# Patient Record
Sex: MALE | Race: BLACK OR AFRICAN AMERICAN | Employment: OTHER | URBAN - METROPOLITAN AREA
[De-identification: names, ages, dates, MRNs, and addresses within clinical notes are randomized per-mention and may not be internally consistent; named-entity substitution may affect disease eponyms.]

---

## 2019-03-05 ENCOUNTER — OFFICE VISIT (OUTPATIENT)
Dept: FAMILY MEDICINE CLINIC | Age: 59
End: 2019-03-05

## 2019-03-05 VITALS
HEIGHT: 73 IN | RESPIRATION RATE: 14 BRPM | HEART RATE: 73 BPM | TEMPERATURE: 97.5 F | WEIGHT: 180 LBS | SYSTOLIC BLOOD PRESSURE: 172 MMHG | DIASTOLIC BLOOD PRESSURE: 94 MMHG | BODY MASS INDEX: 23.86 KG/M2 | OXYGEN SATURATION: 97 %

## 2019-03-05 DIAGNOSIS — I10 ESSENTIAL HYPERTENSION: Primary | ICD-10-CM

## 2019-03-05 DIAGNOSIS — M54.5 LOW BACK PAIN, UNSPECIFIED BACK PAIN LATERALITY, UNSPECIFIED CHRONICITY, WITH SCIATICA PRESENCE UNSPECIFIED: ICD-10-CM

## 2019-03-05 RX ORDER — PREDNISONE 20 MG/1
TABLET ORAL
COMMUNITY
End: 2019-09-11

## 2019-03-05 RX ORDER — AMLODIPINE BESYLATE 10 MG/1
10 TABLET ORAL DAILY
Qty: 30 TAB | Refills: 5 | Status: SHIPPED | OUTPATIENT
Start: 2019-03-05 | End: 2019-09-11

## 2019-03-05 RX ORDER — CYCLOBENZAPRINE HCL 5 MG
5 TABLET ORAL
COMMUNITY
End: 2019-09-11

## 2019-03-05 NOTE — PROGRESS NOTES
Chief Complaint   Patient presents with    Back Pain     1. When and where did you last receive medical care? Yes Where: [facility]     2. When and where did you last have preventive care such as mammogram, pap smears or colon screening? no    3. What is your current living situation (for example, live alone, live in home with immediate family members)? family    3. Do you have any problems with communication such trouble seeing, hearing, or understanding instructions? No    5. Do you have an advance directive? This is a document that you can give to family members with instructions for how you would want them to make health care decisions for you if you were unable to speak for yourself. (For example, unconscious, delerious)No    PMH/FH/Social Hx reviewed and updated as needed     Applicable screenings reviewed and updated as needed  Medication reconciliation performed. Patient does need medication refills. Health Maintenance reviewed.

## 2019-03-05 NOTE — PROGRESS NOTES
Discharge instructions reviewed with patient    Medication list and understanding of medications reviewed with patient. OTC and herbal medications reviewed and added to med list if applicable  Barriers to adherence assessed. Guidance given regarding new medications this visit, including reason for taking this medicine, and common side effects.         Referred to ortho/spine [address]     Patient advised of appt with Dr Tejal Robles  4/18/2019 1:30 arrive at 1:00 [facility] [id number]      AVS given to patient explained and patient expressed understands

## 2019-03-05 NOTE — PROGRESS NOTES
STEPAN Curiel is a 63 y.o. male being seen today for   Chief Complaint   Patient presents with    Back Pain   . IOV to care a Guerorenae Block for this pt with back pain and htn.   he states that he has back pain for years. Had MRI in 2015 with severe foraminal narrowing and he did benefit from steroid injections at that time. Over the years his pain is worse. His pain is mosly in gluteal area but legs are weak. He has been falling more. Pain is worse with walking or standing a long time but sitting for prolonged time is also hard. 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 Seen at urgent care where he was treated with prednisone taper and flexeril with some benefit. Known hx of htn but has not had a doctor to rx his meds. Previously on amlodipine 10. Previous smoker but he quit long ago    Past Medical History:   Diagnosis Date    Anemia     Bilateral sciatica     Chronic pain     Herniated intervertebral disc of lumbar spine     Hypertension     Muscle spasm of back          ROS  Patient states that he is feeling well. Denies complaints of chest pain, shortness of breath, swelling of legs, dizziness or weakness. he denies nausea, vomiting or diarrhea. Current Outpatient Medications   Medication Sig    cyclobenzaprine (FLEXERIL) 5 mg tablet Take 5 mg by mouth.  predniSONE (DELTASONE) 20 mg tablet Take  by mouth daily (with breakfast).  promethazine (PHENERGAN) 25 mg tablet Take 1 Tab by mouth every six (6) hours as needed. No current facility-administered medications for this visit. PE  Visit Vitals  BP (!) 172/94 (BP 1 Location: Right arm)   Pulse 73   Temp 97.5 °F (36.4 °C) (Temporal)   Resp 14   Ht 6' 1\" (1.854 m)   Wt 180 lb (81.6 kg)   SpO2 97%   BMI 23.75 kg/m²        Alert and oriented with normal mood and affect. he is well developed and well nourished . Lungs are clear without wheezing. Heart rate is regular without murmurs or gallops. There is no lower extremity edema. Gait is slow but stable. Bilateral DP and tibialis pulses are intact          Assessment and Plan:        ICD-10-CM ICD-9-CM    1. Essential hypertension  Restart amlodpine  followup 1-2 mos for recheck with labs  Baseline EKG photocopied for scan into chart. I10 401.9    2. Low back pain, unspecified back pain laterality, unspecified chronicity, with sciatica presence unspecified    Refer to ortho/spine. He may need updated imaging but he will likely qualify for medicaid so will defer till then.    M54.5 724.2      Apply for mediciad  Restart bp med  followup ortho      Jocelyne Piper MD

## 2019-04-18 ENCOUNTER — OFFICE VISIT (OUTPATIENT)
Dept: ORTHOPEDIC SURGERY | Age: 59
End: 2019-04-18

## 2019-04-18 VITALS
RESPIRATION RATE: 16 BRPM | OXYGEN SATURATION: 99 % | BODY MASS INDEX: 25.39 KG/M2 | SYSTOLIC BLOOD PRESSURE: 174 MMHG | DIASTOLIC BLOOD PRESSURE: 109 MMHG | TEMPERATURE: 98 F | WEIGHT: 191.6 LBS | HEART RATE: 70 BPM | HEIGHT: 73 IN

## 2019-04-18 DIAGNOSIS — M16.12 PRIMARY OSTEOARTHRITIS OF LEFT HIP: ICD-10-CM

## 2019-04-18 DIAGNOSIS — M47.816 LUMBAR SPONDYLOSIS: ICD-10-CM

## 2019-04-18 DIAGNOSIS — M48.02 CERVICAL SPINAL STENOSIS: Primary | ICD-10-CM

## 2019-04-18 DIAGNOSIS — M54.50 LUMBAR SPINE PAIN: ICD-10-CM

## 2019-04-18 RX ORDER — GABAPENTIN 300 MG/1
300 CAPSULE ORAL 3 TIMES DAILY
Qty: 60 CAP | Refills: 2 | Status: SHIPPED | OUTPATIENT
Start: 2019-04-18 | End: 2019-09-11

## 2019-04-18 NOTE — PROGRESS NOTES
[address]  [address], [address]  [place]  Phone: [phone]  Fax: [phone]        Vaishali Tao  : 1960  PCP: None      NEW PATIENT      ASSESSMENT AND PLAN     Diagnoses and all orders for this visit:    1. Cervical spinal stenosis  -     gabapentin (NEURONTIN) 300 mg capsule; Take 1 Cap by mouth three (3) times daily. Indications: Neuropathic Pain    2. Lumbar spondylosis    3. Lumbar spine pain  -     AMB POC XRAY, SPINE, LUMBOSACRAL; 4+    4. Primary osteoarthritis of left hip  -     REFERRAL TO ORTHOPEDICS       1. Advised to stay active as tolerated. 2. Referral to Dr. Ferrell Snellen for L hip OA, sx eval  3. Trial of Gabapentin  4. Discussed new MRI if ongoing/progressive cervical symptoms  5. Given information on back pain and preventing injury and hip pain    F/U PRN      CHIEF COMPLAINT  Vaishali Tao is seen today in consultation at the request of Dr. Kyrie Palacios for complaints of back pain. HISTORY OF PRESENT ILLNESS  Vaishali Tao is a 63 y.o. male. RHD. Today pt c/o back pain since . Pt denies any specific incident or injury that caused their pain. Pt reports imbalance. He admits to urinary urgency for the past few months. He denies bowel leakage or incontinence or urgency. He notes L hip pain with walking. He admits to lifting LLE into his car at times. He c/o morning stiffness. Pt denies neck pain and headaches. He reports R shoulder, arm, and side numbness and tingling over the past 2 weeks. He notes it is becoming more consistent. Location of pain: L groin   Does pain radiate into extremities: R shoulder, arm, and side are numb and tingling, more in last 1.5 week. Does patient have weakness: hand clumsiness   Pt denies saddle paresthesias. Medications pt is on: Aspirin PRN. Denies persistent fevers, chills, weight changes, neurogenic bowel or bladder symptoms. Pt denies recent ED visits or hospitalizations. Treatments patient has tried:  Physical therapy:No  Doing HEP: Unknown  Non-opioid medications: Yes  Spinal injections: No  Spinal surgery- No.   Last L MRI 2015: multilevel recess stenosis. Last C MRI 2013: mod stenosis     reviewed. PMHx of HTN, anemia, hip OA. Pt works in cabinetry, unemployed currently. No flowsheet data found. MRI lumbar spine 2015   IMPRESSION:  1. No significant central canal stenosis. 2. Severe left neural foraminal narrowing L4-L5. Correlate for left L4  radiculopathy. 3. Severe right neural foraminal narrowing L5-S1. Correlate for right L5  radiculopathy. 4. Moderate left neural foraminal narrowing L5-S1. Additional mild to moderate  neural foraminal narrowings as above.     See above for detailed level-by-level discussion. MRI cervical spine 2013   IMPRESSION:   1. C2-C3 SHALLOW POSTERIOR DISC PROTRUSION WITH MILD STENOSIS. 2. C3-C4 AND C4-C5 DISC BULGES WITH MILD STENOSIS. 3. C5-C6 SHALLOW POSTERIOR DISC OSTEOPHYTE COMPLEX WITH MODERATE CENTRAL   SPINAL CANAL STENOSIS. MILD BILATERAL NEURAL FORAMINAL STENOSIS  LEFT GREATER THAN RIGHT. 4. C6-C7 SHALLOW POSTERIOR DISC PROTRUSION WITH MODERATE CENTRAL SPINAL  CANAL STENOSIS. PAST MEDICAL HISTORY   Past Medical History:   Diagnosis Date    Anemia     Bilateral sciatica     Chronic pain     Herniated intervertebral disc of lumbar spine     Hypertension     Muscle spasm of back        History reviewed. No pertinent surgical history. MEDICATIONS      Current Outpatient Medications   Medication Sig Dispense Refill    gabapentin (NEURONTIN) 300 mg capsule Take 1 Cap by mouth three (3) times daily. Indications: Neuropathic Pain 60 Cap 2    cyclobenzaprine (FLEXERIL) 5 mg tablet Take 5 mg by mouth.  predniSONE (DELTASONE) 20 mg tablet Take  by mouth daily (with breakfast).  amLODIPine (NORVASC) 10 mg tablet Take 1 Tab by mouth daily.  30 Tab 5       ALLERGIES  No Known Allergies       SOCIAL HISTORY    Social History     Socioeconomic History    Marital status: SINGLE     Spouse name: Not on file    Number of children: Not on file    Years of education: Not on file    Highest education level: Not on file   Occupational History    Not on file   Social Needs    Financial resource strain: Not on file    Food insecurity:     Worry: Not on file     Inability: Not on file    Transportation needs:     Medical: Not on file     Non-medical: Not on file   Tobacco Use    Smoking status: Former Smoker     Types: Cigarettes    Smokeless tobacco: Never Used   Substance and Sexual Activity    Alcohol use: No    Drug use: No    Sexual activity: Not on file   Lifestyle    Physical activity:     Days per week: Not on file     Minutes per session: Not on file    Stress: Not on file   Relationships    Social connections:     Talks on phone: Not on file     Gets together: Not on file     Attends Catholic service: Not on file     Active member of club or organization: Not on file     Attends meetings of clubs or organizations: Not on file     Relationship status: Not on file    Intimate partner violence:     Fear of current or ex partner: Not on file     Emotionally abused: Not on file     Physically abused: Not on file     Forced sexual activity: Not on file   Other Topics Concern    Not on file   Social History Narrative    Not on file     Socioeconomic History    Marital status: SINGLE     Spouse name: Not on file    Number of children: Not on file    Years of education: Not on file    Highest education level: Not on file   Occupational History    Not on file   Social Needs    Financial resource strain: Not on file    Food insecurity:     Worry: Not on file     Inability: Not on file    Transportation needs:     Medical: Not on file     Non-medical: Not on file   Tobacco Use    Smoking status: Former Smoker     Types: Cigarettes    Smokeless tobacco: Never Used   Substance and Sexual Activity  Alcohol use: No    Drug use: No    Sexual activity: Not on file   Lifestyle    Physical activity:     Days per week: Not on file     Minutes per session: Not on file    Stress: Not on file   Relationships    Social connections:     Talks on phone: Not on file     Gets together: Not on file     Attends Scientologist service: Not on file     Active member of club or organization: Not on file     Attends meetings of clubs or organizations: Not on file     Relationship status: Not on file    Intimate partner violence:     Fear of current or ex partner: Not on file     Emotionally abused: Not on file     Physically abused: Not on file     Forced sexual activity: Not on file   Other Topics Concern    Not on file   Social History Narrative    Not on file      Problem Relation Age of Onset    Hypertension Mother     Cancer Mother     Cancer Father     Diabetes Father          REVIEW OF SYSTEMS  Review of Systems   Constitutional: Negative for chills, fever and weight loss. Respiratory: Negative for shortness of breath. Cardiovascular: Negative for chest pain. Gastrointestinal: Negative for constipation. Negative for fecal incontinence   Genitourinary: Positive for urgency. Negative for dysuria. Negative for urinary incontinence   Musculoskeletal: Positive for joint pain. Per HPI   Skin: Negative for rash. Neurological: Positive for tingling and focal weakness. Negative for dizziness, tremors and headaches. Endo/Heme/Allergies: Does not bruise/bleed easily. Psychiatric/Behavioral: The patient does not have insomnia. PHYSICAL EXAMINATION  Visit Vitals  BP (!) 174/109   Pulse 70   Temp 98 °F (36.7 °C)   Resp 16   Ht 6' 1\" (1.854 m)   Wt 191 lb 9.6 oz (86.9 kg)   SpO2 99%   BMI 25.28 kg/m²          Accompanied by self. Constitutional:  Well developed, well nourished, in no acute distress. Psychiatric: Affect and mood are appropriate.    Integumentary: No rashes or abrasions noted on exposed areas. Cardiovascular/Peripheral Vascular: Intact l pulses. No peripheral edema is noted BLE. Lymphatic:  No evidence of lymphedema. No cervical lymphadenopathy. SPINE/MUSCULOSKELETAL EXAM      Cervical spine:  Neck is midline. Normal muscle tone. No focal atrophy is noted. Tenderness to palpation none cervical spine. Lumbar spine:  No rash, ecchymosis, or gross obliquity. No fasciculations. No focal atrophy is noted. Limitation and pain with L hip ROM. Tenderness to palpation none lumbar spine. No tenderness to palpation at the sciatic notch. SI joints non-tender. Trochanters non tender. MOTOR:     Biceps Triceps Deltoids Wrist Ext Wrist Flex Hand Intrin   Right 5/5 5/5 5/5 5/5 5/5 +4/5   Left 5/5 5/5 5/5 5/5 5/5 5/5       Hip Flex Quads Hamstrings Ankle DF EHL Ankle PF   Right 5/5 5/5 5/5 5/5 5/5 5/5   Left 5/5 5/5 5/5 5/5 5/5 5/5        Straight Leg raise negative. Mild difficulty with tandem gait. No clonus. Ambulation without assistive device. FWB. RADIOGRAPHS  Lumbar spine xray films reviewed:  1) disc space narrowing L4-5-S1  2) no instability  3) advanced OA L hip    Written by Lennox Isaacs, as dictated by Vimal Nesbitt MD.    I, Dr. Vimal Nesbitt MD, confirm that all documentation is accurate. Mr. Sheila Hurtado may have a reminder for a \"due or due soon\" health maintenance. I have asked that he contact his primary care provider for follow-up on this health maintenance.

## 2019-04-18 NOTE — PATIENT INSTRUCTIONS
Back Pain: Care Instructions  Your Care Instructions    Back pain has many possible causes. It is often related to problems with muscles and ligaments of the back. It may also be related to problems with the nerves, discs, or bones of the back. Moving, lifting, standing, sitting, or sleeping in an awkward way can strain the back. Sometimes you don't notice the injury until later. Arthritis is another common cause of back pain. Although it may hurt a lot, back pain usually improves on its own within several weeks. Most people recover in 12 weeks or less. Using good home treatment and being careful not to stress your back can help you feel better sooner. Follow-up care is a key part of your treatment and safety. Be sure to make and go to all appointments, and call your doctor if you are having problems. It's also a good idea to know your test results and keep a list of the medicines you take. How can you care for yourself at home? · Sit or lie in positions that are most comfortable and reduce your pain. Try one of these positions when you lie down:  ? Lie on your back with your knees bent and supported by large pillows. ? Lie on the floor with your legs on the seat of a sofa or chair. ? Lie on your side with your knees and hips bent and a pillow between your legs. ? Lie on your stomach if it does not make pain worse. · Do not sit up in bed, and avoid soft couches and twisted positions. Bed rest can help relieve pain at first, but it delays healing. Avoid bed rest after the first day of back pain. · Change positions every 30 minutes. If you must sit for long periods of time, take breaks from sitting. Get up and walk around, or lie in a comfortable position. · Try using a heating pad on a low or medium setting for 15 to 20 minutes every 2 or 3 hours. Try a warm shower in place of one session with the heating pad. · You can also try an ice pack for 10 to 15 minutes every 2 to 3 hours.  Put a thin cloth between the ice pack and your skin. · Take pain medicines exactly as directed. ? If the doctor gave you a prescription medicine for pain, take it as prescribed. ? If you are not taking a prescription pain medicine, ask your doctor if you can take an over-the-counter medicine. · Take short walks several times a day. You can start with 5 to 10 minutes, 3 or 4 times a day, and work up to longer walks. Walk on level surfaces and avoid hills and stairs until your back is better. · Return to work and other activities as soon as you can. Continued rest without activity is usually not good for your back. · To prevent future back pain, do exercises to stretch and strengthen your back and stomach. Learn how to use good posture, safe lifting techniques, and proper body mechanics. When should you call for help? Call your doctor now or seek immediate medical care if:    · You have new or worsening numbness in your legs.     · You have new or worsening weakness in your legs. (This could make it hard to stand up.)     · You lose control of your bladder or bowels.    Watch closely for changes in your health, and be sure to contact your doctor if:    · You have a fever, lose weight, or don't feel well.     · You do not get better as expected. Where can you learn more? Go to http://valeriy-tyesha.info/. Enter P263 in the search box to learn more about \"Back Pain: Care Instructions. \"  Current as of: September 20, 2018  Content Version: 11.9  © 8196-1811 [employer]. Care instructions adapted under license by [employer] (which disclaims liability or warranty for this information). If you have questions about a medical condition or this instruction, always ask your healthcare professional. [place] [address] any warranty or liability for your use of this information.          Hip Pain: Care Instructions  Your Care Instructions    Hip pain may be caused by many things, including overuse, a fall, or a twisting movement. Another cause of hip pain is arthritis. Your pain may increase when you stand up, walk, or squat. The pain may come and go or may be constant. Home treatment can help relieve hip pain, swelling, and stiffness. If your pain is ongoing, you may need more tests and treatment. Follow-up care is a key part of your treatment and safety. Be sure to make and go to all appointments, and call your doctor if you are having problems. It's also a good idea to know your test results and keep a list of the medicines you take. How can you care for yourself at home? · Take pain medicines exactly as directed. ? If the doctor gave you a prescription medicine for pain, take it as prescribed. ? If you are not taking a prescription pain medicine, ask your doctor if you can take an over-the-counter medicine. · Rest and protect your hip. Take a break from any activity, including standing or walking, that may cause pain. · Put ice or a cold pack against your hip for 10 to 20 minutes at a time. Try to do this every 1 to 2 hours for the next 3 days (when you are awake) or until the swelling goes down. Put a thin cloth between the ice and your skin. · Sleep on your healthy side with a pillow between your knees, or sleep on your back with pillows under your knees. · If there is no swelling, you can put moist heat, a heating pad, or a warm cloth on your hip. Do gentle stretching exercises to help keep your hip flexible. · Learn how to prevent falls. Have your vision and hearing checked regularly. Wear slippers or shoes with a nonskid sole. · Stay at a healthy weight. · Wear comfortable shoes. When should you call for help? Call 911 anytime you think you may need emergency care.  For example, call if:    · You have sudden chest pain and shortness of breath, or you cough up blood.     · You are not able to stand or walk or bear weight.     · Your buttocks, legs, or feet feel numb or tingly.     · Your leg or foot is cool or pale or changes color.     · You have severe pain.    Call your doctor now or seek immediate medical care if:    · You have signs of infection, such as:  ? Increased pain, swelling, warmth, or redness in the hip area. ? Red streaks leading from the hip area. ? Pus draining from the hip area. ? A fever.     · You have signs of a blood clot, such as:  ? Pain in your calf, back of the knee, thigh, or groin. ? Redness and swelling in your leg or groin.     · You are not able to bend, straighten, or move your leg normally.     · You have trouble urinating or having bowel movements.    Watch closely for changes in your health, and be sure to contact your doctor if:    · You do not get better as expected. Where can you learn more? Go to http://valeriy-tyesha.info/. Enter W475 in the search box to learn more about \"Hip Pain: Care Instructions. \"  Current as of: September 23, 2018  Content Version: 11.9  © 8076-7732 [employer]. Care instructions adapted under license by [employer] (which disclaims liability or warranty for this information). If you have questions about a medical condition or this instruction, always ask your healthcare professional. [address] any warranty or liability for your use of this information.

## 2019-05-22 ENCOUNTER — OFFICE VISIT (OUTPATIENT)
Dept: ORTHOPEDIC SURGERY | Facility: CLINIC | Age: 59
End: 2019-05-22

## 2019-05-22 VITALS
RESPIRATION RATE: 18 BRPM | TEMPERATURE: 96.7 F | SYSTOLIC BLOOD PRESSURE: 130 MMHG | HEART RATE: 58 BPM | OXYGEN SATURATION: 99 % | HEIGHT: 73 IN | DIASTOLIC BLOOD PRESSURE: 84 MMHG | BODY MASS INDEX: 24.15 KG/M2 | WEIGHT: 182.2 LBS

## 2019-05-22 DIAGNOSIS — G89.29 CHRONIC LEFT-SIDED LOW BACK PAIN, WITH SCIATICA PRESENCE UNSPECIFIED: ICD-10-CM

## 2019-05-22 DIAGNOSIS — M54.5 CHRONIC LEFT-SIDED LOW BACK PAIN, WITH SCIATICA PRESENCE UNSPECIFIED: ICD-10-CM

## 2019-05-22 DIAGNOSIS — M25.552 LEFT HIP PAIN: ICD-10-CM

## 2019-05-22 DIAGNOSIS — M17.0 PRIMARY OSTEOARTHRITIS OF BOTH KNEES: ICD-10-CM

## 2019-05-22 DIAGNOSIS — D64.9 ANEMIA, UNSPECIFIED TYPE: ICD-10-CM

## 2019-05-22 DIAGNOSIS — M16.12 PRIMARY OSTEOARTHRITIS OF LEFT HIP: Primary | ICD-10-CM

## 2019-05-22 DIAGNOSIS — Z01.818 PRE-OPERATIVE CLEARANCE: ICD-10-CM

## 2019-05-22 DIAGNOSIS — M19.90 INFLAMMATORY ARTHROPATHY: ICD-10-CM

## 2019-05-22 RX ORDER — MELOXICAM 15 MG/1
15 TABLET ORAL DAILY
Qty: 30 TAB | Refills: 2 | Status: SHIPPED | OUTPATIENT
Start: 2019-05-22 | End: 2019-09-11

## 2019-05-22 RX ORDER — BETAMETHASONE SODIUM PHOSPHATE AND BETAMETHASONE ACETATE 3; 3 MG/ML; MG/ML
6 INJECTION, SUSPENSION INTRA-ARTICULAR; INTRALESIONAL; INTRAMUSCULAR; SOFT TISSUE ONCE
Qty: 1 ML | Refills: 0
Start: 2019-05-22 | End: 2019-05-22

## 2019-05-22 NOTE — PROGRESS NOTES
Patient: Chance Mcrae                MRN: [id number]       SSN: [id number]  YOB: 1960        AGE: 63 y.o. SEX: male  Body mass index is 24.04 kg/m². PCP: None  05/22/19    HISTORY:  Mr. Sandra Gotti is a pleasant gentleman who essentially has end-stage arthritis and possibly some avascular necrosis (AVN) involving his left hip. It has been ongoing for about 4 years. Also some back problems and radiculopathy. Difficulty putting shoes and socks on, getting in and out of the car. He works as a daniel. It is very difficult for him. Arthritis runs in the family. He may have sickle cell trait, and we will obtain blood work for him and also consult with Hematology. Also a family history of arthritis. We well get some rheumatological labs as well. The pain is moderate, moderately-severe. Also complaining of knee pain. Gets some radiculopathy down the legs as well. Hurts him to sit for a long time with regards to his back. PHYSICAL EXAMINATION:  He walks with a very distinctive antalgic gait onto the left hip. Also both knees are bad. He is in valgus, a little worse on the right than on the left, and a couple degree fixed flexion deformity. The right hip rotates fairly well. The left hip is quite stiff which reproduces groin discomfort. Slight decrease in sensation to L5 bilaterally. No foot drop. Tibia and EHL 5/5. Both feet warm and well perfused. The hands are not particularly affected. A little bit of Briana and [place] in his nodes but mild. No significant involvement at the MCPs. RADIOGRAPHS:  Review of his x-rays show end-stage arthritis, left hip. Possible mild AVN as well. Cystic activity on both sides of the joint. PLAN:  I think he should have a direct anterior hip replacement. I think he would do well with it.   We talked about risks and benefits, including but not limited to infection, DVT, pulmonary embolism, anesthetic complications, blood loss requiring transfusion, pain, stiffness, dislocation, limp and other complications including numbness and tingling. PROCEDURE:  Each of the 2 knees were injected today with 1 mL of Celestone preparation, 6 mg. When he returns, we will be reviewing the consultation note from Hematology, reviewing his blood work, and obtaining x-rays of the knees. It has been a pleasure to share in his care. CC:  [id number]        REVIEW OF SYSTEMS:      CON: negative for weight loss, fever  EYE: negative for double vision  ENT: negative for hoarseness  RS:   negative for Tb  GI:    negative for blood in stool  :  negative for blood in urine  Other systems reviewed and noted below. Past Medical History:   Diagnosis Date    Anemia     Bilateral sciatica     Chronic pain     Herniated intervertebral disc of lumbar spine     Hypertension     Muscle spasm of back        Family History   Problem Relation Age of Onset    Hypertension Mother     Cancer Mother     Cancer Father     Diabetes Father        Current Outpatient Medications   Medication Sig Dispense Refill    gabapentin (NEURONTIN) 300 mg capsule Take 1 Cap by mouth three (3) times daily. Indications: Neuropathic Pain 60 Cap 2    amLODIPine (NORVASC) 10 mg tablet Take 1 Tab by mouth daily. 30 Tab 5    cyclobenzaprine (FLEXERIL) 5 mg tablet Take 5 mg by mouth.  predniSONE (DELTASONE) 20 mg tablet Take  by mouth daily (with breakfast). No Known Allergies    History reviewed. No pertinent surgical history.     Social History     Socioeconomic History    Marital status: SINGLE     Spouse name: Not on file    Number of children: Not on file    Years of education: Not on file    Highest education level: Not on file   Occupational History    Not on file   Social Needs    Financial resource strain: Not on file    Food insecurity:     Worry: Not on file     Inability: Not on file   [employer] needs: Medical: Not on file     Non-medical: Not on file   Tobacco Use    Smoking status: Former Smoker     Types: Cigarettes    Smokeless tobacco: Never Used   Substance and Sexual Activity    Alcohol use: No    Drug use: No    Sexual activity: Not on file   Lifestyle    Physical activity:     Days per week: Not on file     Minutes per session: Not on file    Stress: Not on file   Relationships    Social connections:     Talks on phone: Not on file     Gets together: Not on file     Attends Orthodoxy service: Not on file     Active member of club or organization: Not on file     Attends meetings of clubs or organizations: Not on file     Relationship status: Not on file    Intimate partner violence:     Fear of current or ex partner: Not on file     Emotionally abused: Not on file     Physically abused: Not on file     Forced sexual activity: Not on file   Other Topics Concern    Not on file   Social History Narrative    Not on file       Visit Vitals  /84   Pulse (!) 58   Temp 96.7 °F (35.9 °C) (Oral)   Resp 18   Ht 6' 1\" (1.854 m)   Wt 182 lb 3.2 oz (82.6 kg)   SpO2 99%   BMI 24.04 kg/m²         PHYSICAL EXAMINATION:  GENERAL: Alert and oriented x3, in no acute distress, well-developed, well-nourished, afebrile. HEART: No JVD. EYES: No scleral icterus   NECK: No significant lymphadenopathy   LUNGS: No respiratory compromise or indrawing  ABDOMEN: Soft, non-tender, non-distended. Electronically signed by:  Fadia Klein MD

## 2019-05-23 ENCOUNTER — HOSPITAL ENCOUNTER (EMERGENCY)
Age: 59
Discharge: HOME | End: 2019-05-24
Attending: EMERGENCY MEDICINE | Admitting: EMERGENCY MEDICINE
Payer: MEDICAID

## 2019-05-23 VITALS
OXYGEN SATURATION: 100 % | SYSTOLIC BLOOD PRESSURE: 173 MMHG | DIASTOLIC BLOOD PRESSURE: 103 MMHG | TEMPERATURE: 97 F | HEART RATE: 56 BPM | RESPIRATION RATE: 16 BRPM

## 2019-05-23 DIAGNOSIS — R11.2 NON-INTRACTABLE VOMITING WITH NAUSEA, UNSPECIFIED VOMITING TYPE: Primary | ICD-10-CM

## 2019-05-23 LAB
ALBUMIN SERPL-MCNC: 4.2 G/DL (ref 3.4–5)
ALBUMIN/GLOB SERPL: 1.1 {RATIO} (ref 0.8–1.7)
ALP SERPL-CCNC: 60 U/L (ref 45–117)
ALT SERPL-CCNC: 17 U/L (ref 16–61)
ANION GAP SERPL CALC-SCNC: 9 MMOL/L (ref 3–18)
APPEARANCE UR: ABNORMAL
AST SERPL-CCNC: 25 U/L (ref 15–37)
BASOPHILS # BLD: 0 K/UL (ref 0–0.1)
BASOPHILS NFR BLD: 0 % (ref 0–2)
BILIRUB SERPL-MCNC: 0.4 MG/DL (ref 0.2–1)
BILIRUB UR QL: NEGATIVE
BUN SERPL-MCNC: 14 MG/DL (ref 7–18)
BUN/CREAT SERPL: 17 (ref 12–20)
CALCIUM SERPL-MCNC: 9.5 MG/DL (ref 8.5–10.1)
CHLORIDE SERPL-SCNC: 105 MMOL/L (ref 100–108)
CK MB CFR SERPL CALC: 1 % (ref 0–4)
CK MB SERPL-MCNC: 1.8 NG/ML (ref 5–25)
CK SERPL-CCNC: 189 U/L (ref 39–308)
CO2 SERPL-SCNC: 24 MMOL/L (ref 21–32)
COLOR UR: YELLOW
CREAT SERPL-MCNC: 0.84 MG/DL (ref 0.6–1.3)
DIFFERENTIAL METHOD BLD: ABNORMAL
EOSINOPHIL # BLD: 0 K/UL (ref 0–0.4)
EOSINOPHIL NFR BLD: 0 % (ref 0–5)
ERYTHROCYTE [DISTWIDTH] IN BLOOD BY AUTOMATED COUNT: 13.9 % (ref 11.6–14.5)
GLOBULIN SER CALC-MCNC: 3.7 G/DL (ref 2–4)
GLUCOSE SERPL-MCNC: 137 MG/DL (ref 74–99)
GLUCOSE UR STRIP.AUTO-MCNC: NEGATIVE MG/DL
HCT VFR BLD AUTO: 35.8 % (ref 36–48)
HGB BLD-MCNC: 12 G/DL (ref 13–16)
HGB UR QL STRIP: NEGATIVE
KETONES UR QL STRIP.AUTO: ABNORMAL MG/DL
LEUKOCYTE ESTERASE UR QL STRIP.AUTO: NEGATIVE
LIPASE SERPL-CCNC: 85 U/L (ref 73–393)
LYMPHOCYTES # BLD: 1.9 K/UL (ref 0.9–3.6)
LYMPHOCYTES NFR BLD: 14 % (ref 21–52)
MCH RBC QN AUTO: 22.8 PG (ref 24–34)
MCHC RBC AUTO-ENTMCNC: 33.5 G/DL (ref 31–37)
MCV RBC AUTO: 67.9 FL (ref 74–97)
MONOCYTES # BLD: 0.8 K/UL (ref 0.05–1.2)
MONOCYTES NFR BLD: 6 % (ref 3–10)
NEUTS SEG # BLD: 10.7 K/UL (ref 1.8–8)
NEUTS SEG NFR BLD: 80 % (ref 40–73)
NITRITE UR QL STRIP.AUTO: NEGATIVE
PH UR STRIP: 8.5 [PH] (ref 5–8)
PLATELET # BLD AUTO: 294 K/UL (ref 135–420)
PMV BLD AUTO: 9.3 FL (ref 9.2–11.8)
POTASSIUM SERPL-SCNC: 4.3 MMOL/L (ref 3.5–5.5)
PROT SERPL-MCNC: 7.9 G/DL (ref 6.4–8.2)
PROT UR STRIP-MCNC: NEGATIVE MG/DL
RBC # BLD AUTO: 5.27 M/UL (ref 4.7–5.5)
SODIUM SERPL-SCNC: 138 MMOL/L (ref 136–145)
SP GR UR REFRACTOMETRY: 1.01 (ref 1–1.03)
TROPONIN I SERPL-MCNC: <0.02 NG/ML (ref 0–0.04)
UROBILINOGEN UR QL STRIP.AUTO: 0.2 EU/DL (ref 0.2–1)
WBC # BLD AUTO: 13.4 K/UL (ref 4.6–13.2)

## 2019-05-23 PROCEDURE — 80053 COMPREHEN METABOLIC PANEL: CPT

## 2019-05-23 PROCEDURE — 96372 THER/PROPH/DIAG INJ SC/IM: CPT

## 2019-05-23 PROCEDURE — 74011250636 HC RX REV CODE- 250/636: Performed by: EMERGENCY MEDICINE

## 2019-05-23 PROCEDURE — 83690 ASSAY OF LIPASE: CPT

## 2019-05-23 PROCEDURE — 82550 ASSAY OF CK (CPK): CPT

## 2019-05-23 PROCEDURE — 93005 ELECTROCARDIOGRAM TRACING: CPT

## 2019-05-23 PROCEDURE — 96374 THER/PROPH/DIAG INJ IV PUSH: CPT

## 2019-05-23 PROCEDURE — 85025 COMPLETE CBC W/AUTO DIFF WBC: CPT

## 2019-05-23 PROCEDURE — 81003 URINALYSIS AUTO W/O SCOPE: CPT

## 2019-05-23 PROCEDURE — 96375 TX/PRO/DX INJ NEW DRUG ADDON: CPT

## 2019-05-23 PROCEDURE — 99285 EMERGENCY DEPT VISIT HI MDM: CPT

## 2019-05-23 RX ORDER — ONDANSETRON 2 MG/ML
4 INJECTION INTRAMUSCULAR; INTRAVENOUS
Status: COMPLETED | OUTPATIENT
Start: 2019-05-23 | End: 2019-05-23

## 2019-05-23 RX ORDER — METOCLOPRAMIDE HYDROCHLORIDE 5 MG/ML
10 INJECTION INTRAMUSCULAR; INTRAVENOUS
Status: COMPLETED | OUTPATIENT
Start: 2019-05-23 | End: 2019-05-24

## 2019-05-23 RX ORDER — DICYCLOMINE HYDROCHLORIDE 10 MG/ML
20 INJECTION INTRAMUSCULAR
Status: COMPLETED | OUTPATIENT
Start: 2019-05-23 | End: 2019-05-23

## 2019-05-23 RX ADMIN — ONDANSETRON 4 MG: 2 INJECTION INTRAMUSCULAR; INTRAVENOUS at 21:11

## 2019-05-23 RX ADMIN — DICYCLOMINE HYDROCHLORIDE 20 MG: 20 INJECTION, SOLUTION INTRAMUSCULAR at 21:11

## 2019-05-24 PROCEDURE — 74011250636 HC RX REV CODE- 250/636: Performed by: EMERGENCY MEDICINE

## 2019-05-24 RX ORDER — ONDANSETRON 4 MG/1
4 TABLET, ORALLY DISINTEGRATING ORAL
Qty: 12 TAB | Refills: 0 | Status: SHIPPED | OUTPATIENT
Start: 2019-05-24 | End: 2019-09-11

## 2019-05-24 RX ADMIN — METOCLOPRAMIDE 10 MG: 5 INJECTION, SOLUTION INTRAMUSCULAR; INTRAVENOUS at 00:18

## 2019-05-24 NOTE — ED PROVIDER NOTES
EMERGENCY DEPARTMENT HISTORY AND PHYSICAL EXAM    8:49 PM      Date: 5/23/2019  Patient Name: Vaishali Tao    History of Presenting Illness     Chief Complaint   Patient presents with    Abdominal Pain    Vomiting         History Provided By: Patient    Additional History (Context): Vaishali Tao is a 63 y.o. male with history of hypertension who presents with about 5 hours of nausea and vomiting. He states he occasionally has diffuse cramps before he vomits, but denies other abdominal pain. He denies any diarrhea, fever or known sick contacts. PCP: None    Current Outpatient Medications   Medication Sig Dispense Refill    ondansetron (ZOFRAN ODT) 4 mg disintegrating tablet Take 1 Tab by mouth every eight (8) hours as needed for Nausea. 12 Tab 0    meloxicam (MOBIC) 15 mg tablet Take 1 Tab by mouth daily. 30 Tab 2    gabapentin (NEURONTIN) 300 mg capsule Take 1 Cap by mouth three (3) times daily. Indications: Neuropathic Pain 60 Cap 2    cyclobenzaprine (FLEXERIL) 5 mg tablet Take 5 mg by mouth.  predniSONE (DELTASONE) 20 mg tablet Take  by mouth daily (with breakfast).  amLODIPine (NORVASC) 10 mg tablet Take 1 Tab by mouth daily. 30 Tab 5       Past History     Past Medical History:  Past Medical History:   Diagnosis Date    Anemia     Bilateral sciatica     Chronic pain     Herniated intervertebral disc of lumbar spine     Hypertension     Muscle spasm of back        Past Surgical History:  History reviewed. No pertinent surgical history.     Family History:  Family History   Problem Relation Age of Onset    Hypertension Mother     Cancer Mother     Cancer Father     Diabetes Father        Social History:  Social History     Tobacco Use    Smoking status: Former Smoker     Types: Cigarettes    Smokeless tobacco: Never Used   Substance Use Topics    Alcohol use: No    Drug use: No       Allergies:  No Known Allergies      Review of Systems       Review of Systems   Constitutional: Negative for activity change and appetite change. HENT: Negative for congestion. Eyes: Negative for visual disturbance. Respiratory: Negative for cough and shortness of breath. Cardiovascular: Negative for chest pain. Gastrointestinal: Positive for nausea and vomiting. Negative for abdominal pain and diarrhea. Genitourinary: Negative for dysuria. Musculoskeletal: Negative for arthralgias and myalgias. Skin: Negative for rash. Neurological: Negative for weakness and numbness. Physical Exam     Visit Vitals  BP (!) 173/103 (BP 1 Location: Right arm, BP Patient Position: At rest)   Pulse (!) 56   Temp 97 °F (36.1 °C)   Resp 16   SpO2 100%         Physical Exam   Constitutional: He is oriented to person, place, and time. He appears well-developed and well-nourished. HENT:   Head: Normocephalic and atraumatic. Mouth/Throat: Oropharynx is clear and moist.   Eyes: Conjunctivae are normal.   Neck: Normal range of motion. Neck supple. No JVD present. Cardiovascular: Normal rate, regular rhythm, normal heart sounds and intact distal pulses. No murmur heard. Pulmonary/Chest: Effort normal and breath sounds normal.   Abdominal: Soft. Bowel sounds are normal. He exhibits no distension. There is no tenderness. Musculoskeletal: Normal range of motion. He exhibits no deformity. Lymphadenopathy:     He has no cervical adenopathy. Neurological: He is alert and oriented to person, place, and time. Coordination normal.   Skin: Skin is warm and dry. No rash noted. Psychiatric: He has a normal mood and affect. Nursing note and vitals reviewed.         Diagnostic Study Results     Labs -  Recent Results (from the past 12 hour(s))   EKG, 12 LEAD, INITIAL    Collection Time: 05/23/19  8:15 PM   Result Value Ref Range    Ventricular Rate 62 BPM    Atrial Rate 62 BPM    P-R Interval 190 ms    QRS Duration 102 ms    Q-T Interval 404 ms    QTC Calculation (Bezet) 410 ms    Calculated P Axis 66 degrees    Calculated R Axis 62 degrees    Calculated T Axis 67 degrees    Diagnosis       Normal sinus rhythm  Normal ECG  No previous ECGs available     CBC WITH AUTOMATED DIFF    Collection Time: 05/23/19  8:26 PM   Result Value Ref Range    WBC 13.4 (H) 4.6 - 13.2 K/uL    RBC 5.27 4.70 - 5.50 M/uL    HGB 12.0 (L) 13.0 - 16.0 g/dL    HCT 35.8 (L) 36.0 - 48.0 %    MCV 67.9 (L) 74.0 - 97.0 FL    MCH 22.8 (L) 24.0 - 34.0 PG    MCHC 33.5 31.0 - 37.0 g/dL    RDW 13.9 11.6 - 14.5 %    PLATELET 792 921 - 929 K/uL    MPV 9.3 9.2 - 11.8 FL    NEUTROPHILS 80 (H) 40 - 73 %    LYMPHOCYTES 14 (L) 21 - 52 %    MONOCYTES 6 3 - 10 %    EOSINOPHILS 0 0 - 5 %    BASOPHILS 0 0 - 2 %    ABS. NEUTROPHILS 10.7 (H) 1.8 - 8.0 K/UL    ABS. LYMPHOCYTES 1.9 0.9 - 3.6 K/UL    ABS. MONOCYTES 0.8 0.05 - 1.2 K/UL    ABS. EOSINOPHILS 0.0 0.0 - 0.4 K/UL    ABS. BASOPHILS 0.0 0.0 - 0.1 K/UL    DF AUTOMATED     METABOLIC PANEL, COMPREHENSIVE    Collection Time: 05/23/19  8:26 PM   Result Value Ref Range    Sodium 138 136 - 145 mmol/L    Potassium 4.3 3.5 - 5.5 mmol/L    Chloride 105 100 - 108 mmol/L    CO2 24 21 - 32 mmol/L    Anion gap 9 3.0 - 18 mmol/L    Glucose 137 (H) 74 - 99 mg/dL    BUN 14 7.0 - 18 MG/DL    Creatinine 0.84 0.6 - 1.3 MG/DL    BUN/Creatinine ratio 17 12 - 20      GFR est AA >60 >60 ml/min/1.73m2    GFR est non-AA >60 >60 ml/min/1.73m2    Calcium 9.5 8.5 - 10.1 MG/DL    Bilirubin, total 0.4 0.2 - 1.0 MG/DL    ALT (SGPT) 17 16 - 61 U/L    AST (SGOT) 25 15 - 37 U/L    Alk.  phosphatase 60 45 - 117 U/L    Protein, total 7.9 6.4 - 8.2 g/dL    Albumin 4.2 3.4 - 5.0 g/dL    Globulin 3.7 2.0 - 4.0 g/dL    A-G Ratio 1.1 0.8 - 1.7     CARDIAC PANEL,(CK, CKMB & TROPONIN)    Collection Time: 05/23/19  8:26 PM   Result Value Ref Range     39 - 308 U/L    CK - MB 1.8 <3.6 ng/ml    CK-MB Index 1.0 0.0 - 4.0 %    Troponin-I, QT <0.02 0.0 - 0.045 NG/ML   LIPASE    Collection Time: 05/23/19  8:26 PM   Result Value Ref Range    Lipase 85 73 - 393 U/L   URINALYSIS W/ RFLX MICROSCOPIC    Collection Time: 05/23/19  8:30 PM   Result Value Ref Range    Color YELLOW      Appearance CLOUDY      Specific gravity 1.014 1.005 - 1.030      pH (UA) 8.5 (H) 5.0 - 8.0      Protein NEGATIVE  NEG mg/dL    Glucose NEGATIVE  NEG mg/dL    Ketone TRACE (A) NEG mg/dL    Bilirubin NEGATIVE  NEG      Blood NEGATIVE  NEG      Urobilinogen 0.2 0.2 - 1.0 EU/dL    Nitrites NEGATIVE  NEG      Leukocyte Esterase NEGATIVE  NEG         Radiologic Studies -   No orders to display         Medical Decision Making   I am the first provider for this patient. I reviewed the vital signs, available nursing notes, past medical history, past surgical history, family history and social history. Vital Signs-Reviewed the patient's vital signs. Normal sinus rhythm, rate 62, , QTc 410. No acute ST or T wave changes, no STEMI. Records Reviewed: Nursing Notes (Time of Review: 8:49 PM)      Provider Notes (Medical Decision Making):   Gen Palm is a 63 y.o. male with history of hypertension who presents with about 5 hours of nausea and vomiting. He states he occasionally has diffuse cramps before he vomits, but denies other abdominal pain. He denies any diarrhea, fever or known sick contacts. Differential Diagnosis: Likely gastroenteritis or foodborne illness, low suspicion for hepatobiliary disease or acute infectious etiology. Testing: CBC, CMP, lipase  Treatments: Zofran, IV fluids    Re-evaluations:  Patient feels improved after administration of Zofran and Reglan. Blood work was fine, he is tolerating p.o. Trial.The patient will be discharged home. Findings were discussed at length and questions were answered. Information on all newly prescribed medications was given. the patient was instructed to follow-up with his primary physician, or return to the Emergency Department with any worsened symptoms or concerns. Return precautions were given.       Diagnosis Clinical Impression:   1. Non-intractable vomiting with nausea, unspecified vomiting type        Disposition: Discharge    Follow-up Information     Follow up With Specialties Details Why [facility]    [facility] EMERGENCY DEPT Emergency Medicine  If symptoms worsen [address] [place] [id number]  [phone]           Patient's Medications   Start Taking    ONDANSETRON (ZOFRAN ODT) 4 MG DISINTEGRATING TABLET    Take 1 Tab by mouth every eight (8) hours as needed for Nausea. Continue Taking    AMLODIPINE (NORVASC) 10 MG TABLET    Take 1 Tab by mouth daily. CYCLOBENZAPRINE (FLEXERIL) 5 MG TABLET    Take 5 mg by mouth. GABAPENTIN (NEURONTIN) 300 MG CAPSULE    Take 1 Cap by mouth three (3) times daily. Indications: Neuropathic Pain    MELOXICAM (MOBIC) 15 MG TABLET    Take 1 Tab by mouth daily. PREDNISONE (DELTASONE) 20 MG TABLET    Take  by mouth daily (with breakfast). These Medications have changed    No medications on file   Stop Taking    No medications on file     _______________________________    Attestations:  Nanette Mahajan MD acting as a scribe for and in the presence of Esther Bonilla MD      May 24, 2019 at 12:42 AM       Provider Attestation:      I personally performed the services described in the documentation, reviewed the documentation, as recorded by the scribe in my presence, and it accurately and completely records my words and actions.  May 24, 2019 at 12:42 AM - Esther Bonilla MD    _______________________________

## 2019-05-24 NOTE — ED TRIAGE NOTES
Per medic patient came from home. Pt states he thought he got over heated; working in his garage. Pt states he started vomiting at 1530, pt states he vomited 5x. Pt states he is c/o of abd. Pain that started today. Pt states he has medical history of HTN. Pt is AOX4; /100, HR 68, RA 96%, pt ambulated from EMS stretcher to ED stretcher.

## 2019-05-24 NOTE — DISCHARGE INSTRUCTIONS
Patient Education        Nausea and Vomiting: Care Instructions  Your Care Instructions    When you are nauseated, you may feel weak and sweaty and notice a lot of saliva in your mouth. Nausea often leads to vomiting. Most of the time you do not need to worry about nausea and vomiting, but they can be signs of other illnesses. Two common causes of nausea and vomiting are stomach flu and food poisoning. Nausea and vomiting from viral stomach flu will usually start to improve within 24 hours. Nausea and vomiting from food poisoning may last from 12 to 48 hours. The doctor has checked you carefully, but problems can develop later. If you notice any problems or new symptoms, get medical treatment right away. Follow-up care is a key part of your treatment and safety. Be sure to make and go to all appointments, and call your doctor if you are having problems. It's also a good idea to know your test results and keep a list of the medicines you take. How can you care for yourself at home? · To prevent dehydration, drink plenty of fluids, enough so that your urine is light yellow or clear like water. Choose water and other caffeine-free clear liquids until you feel better. If you have kidney, heart, or liver disease and have to limit fluids, talk with your doctor before you increase the amount of fluids you drink. · Rest in bed until you feel better. · When you are able to eat, try clear soups, mild foods, and liquids until all symptoms are gone for 12 to 48 hours. Other good choices include dry toast, crackers, cooked cereal, and gelatin dessert, such as Jell-O. When should you call for help? Call 911 anytime you think you may need emergency care. For example, call if:    · You passed out (lost consciousness).    Call your doctor now or seek immediate medical care if:    · You have symptoms of dehydration, such as:  ? Dry eyes and a dry mouth. ? Passing only a little dark urine. ?  Feeling thirstier than usual.   · You have new or worsening belly pain.     · You have a new or higher fever.     · You vomit blood or what looks like coffee grounds.    Watch closely for changes in your health, and be sure to contact your doctor if:    · You have ongoing nausea and vomiting.     · Your vomiting is getting worse.     · Your vomiting lasts longer than 2 days.     · You are not getting better as expected. Where can you learn more? Go to http://valeriy-tyesha.info/. Enter 25 [id number] in the search box to learn more about \"Nausea and Vomiting: Care Instructions. \"  Current as of: September 23, 2018  Content Version: 11.9  © 3605-7831 [employer]. Care instructions adapted under license by [employer] (which disclaims liability or warranty for this information). If you have questions about a medical condition or this instruction, always ask your healthcare professional. [address] any warranty or liability for your use of this information.

## 2019-05-25 LAB
ATRIAL RATE: 62 BPM
CALCULATED P AXIS, ECG09: 66 DEGREES
CALCULATED R AXIS, ECG10: 62 DEGREES
CALCULATED T AXIS, ECG11: 67 DEGREES
DIAGNOSIS, 93000: NORMAL
P-R INTERVAL, ECG05: 190 MS
Q-T INTERVAL, ECG07: 404 MS
QRS DURATION, ECG06: 102 MS
QTC CALCULATION (BEZET), ECG08: 410 MS
VENTRICULAR RATE, ECG03: 62 BPM

## 2019-09-11 ENCOUNTER — HOSPITAL ENCOUNTER (EMERGENCY)
Age: 59
Discharge: HOME | End: 2019-09-11
Attending: EMERGENCY MEDICINE
Payer: MEDICAID

## 2019-09-11 ENCOUNTER — APPOINTMENT (OUTPATIENT)
Dept: GENERAL RADIOLOGY | Age: 59
End: 2019-09-11
Attending: PHYSICIAN ASSISTANT
Payer: MEDICAID

## 2019-09-11 VITALS
WEIGHT: 185 LBS | OXYGEN SATURATION: 98 % | HEART RATE: 73 BPM | HEIGHT: 73 IN | DIASTOLIC BLOOD PRESSURE: 87 MMHG | SYSTOLIC BLOOD PRESSURE: 142 MMHG | TEMPERATURE: 98.3 F | BODY MASS INDEX: 24.52 KG/M2 | RESPIRATION RATE: 16 BRPM

## 2019-09-11 DIAGNOSIS — M16.12 ARTHRITIS OF LEFT HIP: Primary | ICD-10-CM

## 2019-09-11 DIAGNOSIS — M79.2 CERVICAL SPINE ARTHRITIS WITH NERVE PAIN: ICD-10-CM

## 2019-09-11 DIAGNOSIS — M47.812 CERVICAL SPINE ARTHRITIS WITH NERVE PAIN: ICD-10-CM

## 2019-09-11 LAB
ALBUMIN SERPL-MCNC: 3.6 G/DL (ref 3.4–5)
ALBUMIN/GLOB SERPL: 1 {RATIO} (ref 0.8–1.7)
ALP SERPL-CCNC: 61 U/L (ref 45–117)
ALT SERPL-CCNC: 15 U/L (ref 16–61)
ANION GAP SERPL CALC-SCNC: 4 MMOL/L (ref 3–18)
APPEARANCE UR: CLEAR
AST SERPL-CCNC: 15 U/L (ref 10–38)
BASOPHILS # BLD: 0 K/UL (ref 0–0.1)
BASOPHILS NFR BLD: 0 % (ref 0–2)
BILIRUB SERPL-MCNC: 0.5 MG/DL (ref 0.2–1)
BILIRUB UR QL: NEGATIVE
BUN SERPL-MCNC: 13 MG/DL (ref 7–18)
BUN/CREAT SERPL: 17 (ref 12–20)
CALCIUM SERPL-MCNC: 8.8 MG/DL (ref 8.5–10.1)
CHLORIDE SERPL-SCNC: 108 MMOL/L (ref 100–111)
CO2 SERPL-SCNC: 32 MMOL/L (ref 21–32)
COLOR UR: YELLOW
CREAT SERPL-MCNC: 0.78 MG/DL (ref 0.6–1.3)
DIFFERENTIAL METHOD BLD: ABNORMAL
EOSINOPHIL # BLD: 0.2 K/UL (ref 0–0.4)
EOSINOPHIL NFR BLD: 3 % (ref 0–5)
ERYTHROCYTE [DISTWIDTH] IN BLOOD BY AUTOMATED COUNT: 14.4 % (ref 11.6–14.5)
GLOBULIN SER CALC-MCNC: 3.6 G/DL (ref 2–4)
GLUCOSE SERPL-MCNC: 87 MG/DL (ref 74–99)
GLUCOSE UR STRIP.AUTO-MCNC: NEGATIVE MG/DL
HCT VFR BLD AUTO: 35.4 % (ref 36–48)
HGB BLD-MCNC: 11.6 G/DL (ref 13–16)
HGB UR QL STRIP: NEGATIVE
KETONES UR QL STRIP.AUTO: NEGATIVE MG/DL
LEUKOCYTE ESTERASE UR QL STRIP.AUTO: NEGATIVE
LYMPHOCYTES # BLD: 1.9 K/UL (ref 0.9–3.6)
LYMPHOCYTES NFR BLD: 28 % (ref 21–52)
MAGNESIUM SERPL-MCNC: 2.2 MG/DL (ref 1.6–2.6)
MCH RBC QN AUTO: 22.8 PG (ref 24–34)
MCHC RBC AUTO-ENTMCNC: 32.8 G/DL (ref 31–37)
MCV RBC AUTO: 69.5 FL (ref 74–97)
MONOCYTES # BLD: 0.5 K/UL (ref 0.05–1.2)
MONOCYTES NFR BLD: 7 % (ref 3–10)
NEUTS SEG # BLD: 4.3 K/UL (ref 1.8–8)
NEUTS SEG NFR BLD: 62 % (ref 40–73)
NITRITE UR QL STRIP.AUTO: NEGATIVE
PH UR STRIP: 6 [PH] (ref 5–8)
PLATELET # BLD AUTO: 304 K/UL (ref 135–420)
PMV BLD AUTO: 9.1 FL (ref 9.2–11.8)
POTASSIUM SERPL-SCNC: 4.2 MMOL/L (ref 3.5–5.5)
PROT SERPL-MCNC: 7.2 G/DL (ref 6.4–8.2)
PROT UR STRIP-MCNC: NEGATIVE MG/DL
RBC # BLD AUTO: 5.09 M/UL (ref 4.7–5.5)
SODIUM SERPL-SCNC: 144 MMOL/L (ref 136–145)
SP GR UR REFRACTOMETRY: 1.02 (ref 1–1.03)
UROBILINOGEN UR QL STRIP.AUTO: 1 EU/DL (ref 0.2–1)
WBC # BLD AUTO: 6.8 K/UL (ref 4.6–13.2)

## 2019-09-11 PROCEDURE — 74011250636 HC RX REV CODE- 250/636: Performed by: PHYSICIAN ASSISTANT

## 2019-09-11 PROCEDURE — 80053 COMPREHEN METABOLIC PANEL: CPT

## 2019-09-11 PROCEDURE — 83735 ASSAY OF MAGNESIUM: CPT

## 2019-09-11 PROCEDURE — 99282 EMERGENCY DEPT VISIT SF MDM: CPT

## 2019-09-11 PROCEDURE — 72040 X-RAY EXAM NECK SPINE 2-3 VW: CPT

## 2019-09-11 PROCEDURE — 85025 COMPLETE CBC W/AUTO DIFF WBC: CPT

## 2019-09-11 PROCEDURE — 96374 THER/PROPH/DIAG INJ IV PUSH: CPT

## 2019-09-11 PROCEDURE — 81003 URINALYSIS AUTO W/O SCOPE: CPT

## 2019-09-11 RX ORDER — KETOROLAC TROMETHAMINE 15 MG/ML
15 INJECTION, SOLUTION INTRAMUSCULAR; INTRAVENOUS
Status: COMPLETED | OUTPATIENT
Start: 2019-09-11 | End: 2019-09-11

## 2019-09-11 RX ORDER — MELOXICAM 15 MG/1
15 TABLET ORAL DAILY
Qty: 30 TAB | Refills: 0 | Status: SHIPPED | OUTPATIENT
Start: 2019-09-11 | End: 2020-01-14

## 2019-09-11 RX ADMIN — KETOROLAC TROMETHAMINE 15 MG: 15 INJECTION, SOLUTION INTRAMUSCULAR; INTRAVENOUS at 15:49

## 2019-09-11 NOTE — DISCHARGE INSTRUCTIONS
Patient Education        Neck Arthritis: Exercises  Introduction  Here are some examples of exercises for you to try. The exercises may be suggested for a condition or for rehabilitation. Start each exercise slowly. Ease off the exercises if you start to have pain. You will be told when to start these exercises and which ones will work best for you. How to do the exercises  Neck stretches to the side    1. This stretch works best if you keep your shoulder down as you lean away from it. To help you remember to do this, start by relaxing your shoulders and lightly holding on to your thighs or your chair. 2. Tilt your head toward your shoulder and hold for 15 to 30 seconds. Let the weight of your head stretch your muscles. 3. Repeat 2 to 4 times toward each shoulder. Chin tuck    1. Lie on the floor with a rolled-up towel under your neck. Your head should be touching the floor. 2. Slowly bring your chin toward your chest.  3. Hold for a count of 6, and then relax for up to 10 seconds. 4. Repeat 8 to 12 times. Active cervical rotation    1. Sit in a firm chair, or stand up straight. 2. Keeping your chin level, turn your head to the right, and hold for 15 to 30 seconds. 3. Turn your head to the left and hold for 15 to 30 seconds. 4. Repeat 2 to 4 times to each side. Shoulder blade squeeze    1. While standing, squeeze your shoulder blades together. 2. Do not raise your shoulders up as you are squeezing. 3. Hold for 6 seconds. 4. Repeat 8 to 12 times. Shoulder rolls    1. Sit comfortably with your feet shoulder-width apart. You can also do this exercise standing up. 2. Roll your shoulders up, then back, and then down in a smooth, circular motion. 3. Repeat 2 to 4 times. Follow-up care is a key part of your treatment and safety. Be sure to make and go to all appointments, and call your doctor if you are having problems.  It's also a good idea to know your test results and keep a list of the medicines you take. Where can you learn more? Go to http://valeriy-tyesha.info/. Enter O861 in the search box to learn more about \"Neck Arthritis: Exercises. \"  Current as of: September 20, 2018  Content Version: 12.1  © 2410-9201 [employer]. Care instructions adapted under license by [employer] (which disclaims liability or warranty for this information). If you have questions about a medical condition or this instruction, always ask your healthcare professional. [place] [address] any warranty or liability for your use of this information. Patient Education        Hip Arthritis: Care Instructions  Your Care Instructions    Arthritis, also called osteoarthritis, is a breakdown of the tissue (cartilage) that cushions your joints. Many people have some arthritis as they age. When the cartilage in your hip joints wears down, your hip bone rubs against the hip socket. This causes pain and stiffness. Work with your doctor to find the right mix of treatments for your arthritis. There are things you can do at home to protect your hip joints, ease your pain, and help you stay active. But if your arthritis becomes so bad that you cannot walk, you may need surgery to replace the hip joint. Follow-up care is a key part of your treatment and safety. Be sure to make and go to all appointments, and call your doctor if you are having problems. It's also a good idea to know your test results and keep a list of the medicines you take. How can you care for yourself at home? · Stay at a healthy weight. Being overweight puts extra strain on your hip joints. · Talk to your doctor or physical therapist about exercises that will help ease hip pain. These tips may help. ? Stretch to help prevent stiffness and to prevent injury before you exercise. You may enjoy gentle forms of yoga to help keep your joints and muscles flexible. ? Walk instead of jog.  Other types of exercise that are less stressful on the joints include riding a bike, swimming, and doing water exercise. ? Lift weights. Strong muscles help reduce stress on your joints. Stronger thigh muscles, for example, take some of the stress off of the knees and hips. Learn the right way to lift weights so you do not make joint pain worse. · Take pain medicines exactly as directed. ? If the doctor gave you a prescription medicine for pain, take it as prescribed. ? If you are not taking a prescription pain medicine, ask your doctor if you can take an over-the-counter medicine. · Use a cane, crutch, walker, or another device if you need help to get around. These can help rest your hips. You also can use other things to make life easier, such as a higher toilet seat. · Do not sit in low chairs, which can make it painful to get up. · Put heat or cold on your sore hips as needed. Use whichever helps you most. You also can go back and forth between hot and cold packs. ? Apply heat 2 or 3 times a day for 20 to 30 minutes--using a heating pad, hot shower, or hot pack--to relieve pain and stiffness. ? Put ice or a cold pack on your sore hips for 10 to 20 minutes at a time to numb the area. Put a thin cloth between the ice and your skin. · Think about talking to your doctor about using capsaicin, a cream you apply to the skin for pain relief. When should you call for help? Call your doctor now or seek immediate medical care if:    · You have sudden swelling, warmth, or pain in any joint.     · You have joint pain and a fever or rash.     · You have such bad pain that you cannot use the joint.    Watch closely for changes in your health, and be sure to contact your doctor if:    · You have mild joint symptoms that continue even with more than 6 weeks of care at home.     · You do not get better as expected.     · You have stomach pain or other problems with your medicine. Where can you learn more?   Go to http://valeriy-tyesha.info/. Enter E272 in the search box to learn more about \"Hip Arthritis: Care Instructions. \"  Current as of: April 1, 2019  Content Version: 12.1  © 8690-9144 [employer]. Care instructions adapted under license by [facility] (which disclaims liability or warranty for this information). If you have questions about a medical condition or this instruction, always ask your healthcare professional. [place] [address] any warranty or liability for your use of this information.

## 2019-09-11 NOTE — ED PROVIDER NOTES
EMERGENCY DEPARTMENT HISTORY AND PHYSICAL EXAM    Date: 9/11/2019  Patient Name: Rolando Mendez    History of Presenting Illness     Chief Complaint   Patient presents with    Groin Pain    Hip Pain         History Provided By: Patient    Chief Complaint: Left hip pain  Duration: >2 Months  Timing:  Constant  Location: left hip  Quality: Aching and Burning  Severity: 7 out of 10  Modifying Factors: Pain is exacerbated by movement. Associated Symptoms: groin pain      Additional History (Context): Rolando Mendez is a 63 y.o. male with hypertension, herniated lumbar disc, bilateral sciatica, and anemia who presents with left hip pain. Patient asseverates that he was scheduled at  [facility] for a left hip replacement, 2-months ago, \"but it was canceled because I didn't have insurance. \" Notes he does have insurance now. Patient states pain is exacerbated by movement and radiates to his groin intermittently. Patient also reports constant right arm numbness and intermittent numbness to the bottom of his right foot, which both date back to before his hip problems. Patient denies fall/trauma, fever, urinary or bowel changes, nausea, and vomiting. PCP: None    Current Facility-Administered Medications   Medication Dose Route Frequency Provider Last Rate Last Dose    ketorolac (TORADOL) injection 15 mg  15 mg IntraVENous NOW Deedee Mancera PA         Current Outpatient Medications   Medication Sig Dispense Refill    meloxicam (MOBIC) 15 mg tablet Take 1 Tab by mouth daily. 30 Tab 0       Past History     Past Medical History:  Past Medical History:   Diagnosis Date    Anemia     Bilateral sciatica     Chronic pain     Herniated intervertebral disc of lumbar spine     Hypertension     Muscle spasm of back        Past Surgical History:  No past surgical history on file.     Family History:  Family History   Problem Relation Age of Onset    Hypertension Mother     Cancer Mother     Cancer Father    [address] [facility] Diabetes Father        Social History:  Social History     Tobacco Use    Smoking status: Former Smoker     Types: Cigarettes    Smokeless tobacco: Never Used   Substance Use Topics    Alcohol use: No    Drug use: No       Allergies:  No Known Allergies      Review of Systems   Review of Systems   Constitutional: Negative for chills and fatigue. HENT: Negative for rhinorrhea and sinus pressure. Eyes: Negative for visual disturbance. Respiratory: Negative for cough and shortness of breath. Cardiovascular: Negative for chest pain. Gastrointestinal: Negative for abdominal distention, nausea and vomiting. Genitourinary: Negative for dysuria and frequency. Musculoskeletal: Positive for arthralgias. Skin: Negative for rash. Neurological: Positive for numbness. Negative for weakness and headaches. All Other Systems Negative  Physical Exam     Vitals:    09/11/19 1227   BP: 142/87   Pulse: 73   Resp: 16   Temp: 98.3 °F (36.8 °C)   SpO2: 98%   Weight: 83.9 kg (185 lb)   Height: 6' 1\" (1.854 m)     Physical Exam   Constitutional: He is oriented to person, place, and time. He appears well-developed and well-nourished. No distress. HENT:   Head: Normocephalic and atraumatic. Eyes: Conjunctivae are normal. No scleral icterus. Neck: Normal range of motion. No JVD present. Cardiovascular: Normal rate, regular rhythm, normal heart sounds and intact distal pulses. Exam reveals no gallop and no friction rub. No murmur heard. Pulmonary/Chest: Effort normal and breath sounds normal. No respiratory distress. He has no wheezes. He has no rales. Abdominal: Soft. Bowel sounds are normal. He exhibits no distension and no mass. There is no tenderness. There is no rebound and no guarding. Musculoskeletal: He exhibits no edema, tenderness or deformity. No cervical spinous or paraspinal tenderness. E  Limited ROM of left hip secondary to poor compliance/patient effort/pain.  Pain expressed as greatest on extension of hip. No deformities. Full ROM of right hip. Lymphadenopathy:     He has no cervical adenopathy. Neurological: He is alert and oriented to person, place, and time. Skin: Skin is warm and dry. No rash noted. Psychiatric: He has a normal mood and affect. Diagnostic Study Results     Labs -     Recent Results (from the past 12 hour(s))   CBC WITH AUTOMATED DIFF    Collection Time: 09/11/19  1:25 PM   Result Value Ref Range    WBC 6.8 4.6 - 13.2 K/uL    RBC 5.09 4.70 - 5.50 M/uL    HGB 11.6 (L) 13.0 - 16.0 g/dL    HCT 35.4 (L) 36.0 - 48.0 %    MCV 69.5 (L) 74.0 - 97.0 FL    MCH 22.8 (L) 24.0 - 34.0 PG    MCHC 32.8 31.0 - 37.0 g/dL    RDW 14.4 11.6 - 14.5 %    PLATELET 262 189 - 397 K/uL    MPV 9.1 (L) 9.2 - 11.8 FL    NEUTROPHILS 62 40 - 73 %    LYMPHOCYTES 28 21 - 52 %    MONOCYTES 7 3 - 10 %    EOSINOPHILS 3 0 - 5 %    BASOPHILS 0 0 - 2 %    ABS. NEUTROPHILS 4.3 1.8 - 8.0 K/UL    ABS. LYMPHOCYTES 1.9 0.9 - 3.6 K/UL    ABS. MONOCYTES 0.5 0.05 - 1.2 K/UL    ABS. EOSINOPHILS 0.2 0.0 - 0.4 K/UL    ABS. BASOPHILS 0.0 0.0 - 0.1 K/UL    DF AUTOMATED     MAGNESIUM    Collection Time: 09/11/19  1:25 PM   Result Value Ref Range    Magnesium 2.2 1.6 - 2.6 mg/dL   METABOLIC PANEL, COMPREHENSIVE    Collection Time: 09/11/19  1:25 PM   Result Value Ref Range    Sodium 144 136 - 145 mmol/L    Potassium 4.2 3.5 - 5.5 mmol/L    Chloride 108 100 - 111 mmol/L    CO2 32 21 - 32 mmol/L    Anion gap 4 3.0 - 18 mmol/L    Glucose 87 74 - 99 mg/dL    BUN 13 7.0 - 18 MG/DL    Creatinine 0.78 0.6 - 1.3 MG/DL    BUN/Creatinine ratio 17 12 - 20      GFR est AA >60 >60 ml/min/1.73m2    GFR est non-AA >60 >60 ml/min/1.73m2    Calcium 8.8 8.5 - 10.1 MG/DL    Bilirubin, total 0.5 0.2 - 1.0 MG/DL    ALT (SGPT) 15 (L) 16 - 61 U/L    AST (SGOT) 15 10 - 38 U/L    Alk.  phosphatase 61 45 - 117 U/L    Protein, total 7.2 6.4 - 8.2 g/dL    Albumin 3.6 3.4 - 5.0 g/dL    Globulin 3.6 2.0 - 4.0 g/dL    A-G Ratio 1.0 0.8 - 1.7 URINALYSIS W/ RFLX MICROSCOPIC    Collection Time: 09/11/19  3:15 PM   Result Value Ref Range    Color YELLOW      Appearance CLEAR      Specific gravity 1.018 1.005 - 1.030      pH (UA) 6.0 5.0 - 8.0      Protein NEGATIVE  NEG mg/dL    Glucose NEGATIVE  NEG mg/dL    Ketone NEGATIVE  NEG mg/dL    Bilirubin NEGATIVE  NEG      Blood NEGATIVE  NEG      Urobilinogen 1.0 0.2 - 1.0 EU/dL    Nitrites NEGATIVE  NEG      Leukocyte Esterase NEGATIVE  NEG         Radiologic Studies -   XR SPINE CERV PA LAT ODONT 3 V MAX    (Results Pending)     CT Results  (Last 48 hours)    None        CXR Results  (Last 48 hours)    None            Medical Decision Making   I am the first provider for this patient. I reviewed the vital signs, available nursing notes, past medical history, past surgical history, family history and social history. Vital Signs-Reviewed the patient's vital signs. Pulse Oximetry Analysis - 98% on RA      Records Reviewed: Nursing Notes    Procedures:  Procedures    Provider Notes (Medical Decision Making): 97IBE to ED c/o exacerbation of chronic left hip pain for which he is awaiting hip replacement, but first needs pcp clearance. Also reports intermittent right arm tingling. Neuro exam is neg. Has hx of c spine djd per xray in 2011. Today's xray with moderate djd. The tingling is likely neuropathy from the djd. He was on mobic per ortho in the past, will rx for the same and he can f/u with ortho and pcp. SUNITHA Robertson 3:47 PM        MED RECONCILIATION:  Current Facility-Administered Medications   Medication Dose Route Frequency    ketorolac (TORADOL) injection 15 mg  15 mg IntraVENous NOW     Current Outpatient Medications   Medication Sig    meloxicam (MOBIC) 15 mg tablet Take 1 Tab by mouth daily. Disposition:  home    DISCHARGE NOTE:     Pt has been reexamined. Patient has no new complaints, changes, or physical findings. Care plan outlined and precautions discussed.   Results of xray and labs were reviewed with the patient. All medications were reviewed with the patient; will d/c home with mobic. All of pt's questions and concerns were addressed. Patient was instructed and agrees to follow up with pcp and ortho, as well as to return to the ED upon further deterioration. Patient is ready to go home. Follow-up Information     Follow up With Specialties Details Why Contact Info    Bernice Cleary MD Orthopedic Surgery   [address] [place] [address] [phone]      [address] [place] [phone]          Current Discharge Medication List      START taking these medications    Details   meloxicam (MOBIC) 15 mg tablet Take 1 Tab by mouth daily. Qty: 30 Tab, Refills: 0               Diagnosis     Clinical Impression:   1. Arthritis of left hip    2.  Cervical spine arthritis with nerve pain

## 2019-09-11 NOTE — ED TRIAGE NOTES
\"I'm having problems with my left groin and my right arm is constantly numb and tingling. \" Reports symptoms started 2 weeks ago.

## 2019-10-03 ENCOUNTER — OFFICE VISIT (OUTPATIENT)
Dept: ORTHOPEDIC SURGERY | Age: 59
End: 2019-10-03

## 2019-10-03 VITALS
SYSTOLIC BLOOD PRESSURE: 153 MMHG | HEART RATE: 87 BPM | WEIGHT: 178.6 LBS | RESPIRATION RATE: 16 BRPM | HEIGHT: 73 IN | OXYGEN SATURATION: 97 % | BODY MASS INDEX: 23.67 KG/M2 | TEMPERATURE: 97.3 F | DIASTOLIC BLOOD PRESSURE: 92 MMHG

## 2019-10-03 DIAGNOSIS — M54.16 LUMBAR RADICULOPATHY: ICD-10-CM

## 2019-10-03 DIAGNOSIS — M16.12 PRIMARY OSTEOARTHRITIS OF LEFT HIP: Primary | ICD-10-CM

## 2019-10-03 NOTE — PROGRESS NOTES
1. Have you been to the ER, urgent care clinic since your last visit? Hospitalized since your last visit? Yes When: a month agon Where: [facility] Emergency Room Reason for visit: left hip and numbess in the right arm    2. Have you seen or consulted any other health care providers outside of the [address] [place] [address] since your last visit? Include any pap smears or colon screening.  No

## 2019-10-03 NOTE — PROGRESS NOTES
Patient: Jacinda Sauer                MRN: [id number]       SSN: [id number]  YOB: 1960        AGE: 63 y.o. SEX: male  Body mass index is 23.56 kg/m². PCP: None  10/03/19    HISTORY OF PRESENT ILLNESS:  Thank you so much for having me review Norberto. As you know, he has been having issues with his back off and on for years and I suspect this is a diagnosis of endstage arthritis of the hip is a little bit delayed. He eventually ended up in the emergency department and confirmed with endstage arthritis of the left hip likely with some dysplasia. He is suffering with it. It hurts all the time, pain at night, pain with activities of daily living, sometimes even will sleep with the left sock on because he cannot get it off very easily. He has difficulty getting one sock on and getting in and out of the car is getting difficult and going to the grocery store is almost impossible. He was a  and had to stop working because of it. He does describe some radiculopathy going down the left leg. He denies fevers, chills, night sweats or weight loss. REVIEW OF SYSTEMS:  A 12-point review of systems was performed and pertinent positives are noted. All other systems reviewed and otherwise are negative. PHYSICAL EXAMINATION:  On exam, he walks with an antalgic gait. Mild leg length discrepancy. He moves his head and neck adequately. No respiratory compromise or indrawing. His motor strength in the lower extremities is normal.  The left hip only has a jog of rotation, which completely reproduces his groin pain. RADIOGRAPHS:  X-rays confirmed with him confirmed endstage arthritis left hip. IMPRESSION:  He would like to have a direct anterior hip replacement that we offer at the hospital.  Likely outpatient surgery and he can go home the same day, but we may keep him overnight depending on how things go.   Risks and benefits described including but not limited to infection, deep venous thrombosis (DVT), pulmonary embolism, anesthetic complications, blood loss requiring transfusion, pain, stiffness, implant longevity, leg length discrepancy, dislocation, chronic pain, and other problems. All questions answered, and he would like to proceed. I kindly ask for medical clearance. We will get him T'd up for surgery in the not too distant future. CC:  Ava Daniel M.D. Patient: Nish Duncan                MRN: [id number]       SSN: [id number]  YOB: 1960        AGE: 63 y.o. SEX: male  Body mass index is 23.56 kg/m². PCP: None  10/03/19        REVIEW OF SYSTEMS:      CON: negative for weight loss, fever  EYE: negative for double vision  ENT: negative for hoarseness  RS:   negative for Tb  GI:    negative for blood in stool  :  negative for blood in urine  Other systems reviewed and noted below. Past Medical History:   Diagnosis Date    Anemia     Bilateral sciatica     Chronic pain     Herniated intervertebral disc of lumbar spine     Hypertension     Muscle spasm of back        Family History   Problem Relation Age of Onset    Hypertension Mother     Cancer Mother     Cancer Father     Diabetes Father        Current Outpatient Medications   Medication Sig Dispense Refill    meloxicam (MOBIC) 15 mg tablet Take 1 Tab by mouth daily. 30 Tab 0       No Known Allergies    History reviewed. No pertinent surgical history.     Social History     Socioeconomic History    Marital status: SINGLE     Spouse name: Not on file    Number of children: Not on file    Years of education: Not on file    Highest education level: Not on file   Occupational History    Not on file   Social Needs    Financial resource strain: Not on file    Food insecurity:     Worry: Not on file     Inability: Not on file    Transportation needs:     Medical: Not on file     Non-medical: Not on file   Tobacco Use    Smoking status: Former Smoker     Types: Cigarettes    Smokeless tobacco: Never Used   Substance and Sexual Activity    Alcohol use: No    Drug use: No    Sexual activity: Not on file   Lifestyle    Physical activity:     Days per week: Not on file     Minutes per session: Not on file    Stress: Not on file   Relationships    Social connections:     Talks on phone: Not on file     Gets together: Not on file     Attends [employer] service: Not on file     Active member of club or organization: Not on file     Attends meetings of clubs or organizations: Not on file     Relationship status: Not on file    Intimate partner violence:     Fear of current or ex partner: Not on file     Emotionally abused: Not on file     Physically abused: Not on file     Forced sexual activity: Not on file   Other Topics Concern    Not on file   Social History Narrative    Not on file       Visit Vitals  BP (!) 153/92   Pulse 87   Temp 97.3 °F (36.3 °C) (Oral)   Resp 16   Ht 6' 1\" (1.854 m)   Wt 178 lb 9.6 oz (81 kg)   SpO2 97%   BMI 23.56 kg/m²         PHYSICAL EXAMINATION:  GENERAL: Alert and oriented x3, in no acute distress, well-developed, well-nourished, afebrile. HEART: No JVD. EYES: No scleral icterus   NECK: No significant lymphadenopathy   LUNGS: No respiratory compromise or indrawing  ABDOMEN: Soft, non-tender, non-distended. Electronically signed by: Jes Sue MD      REVIEW OF SYSTEMS:      CON: negative for weight loss, fever  EYE: negative for double vision  ENT: negative for hoarseness  RS:   negative for Tb  GI:    negative for blood in stool  :  negative for blood in urine  Other systems reviewed and noted below.           Past Medical History:   Diagnosis Date    Anemia     Bilateral sciatica     Chronic pain     Herniated intervertebral disc of lumbar spine     Hypertension     Muscle spasm of back        Family History   Problem Relation Age of Onset    Hypertension Mother     Cancer Mother     Cancer Father     Diabetes Father        Current Outpatient Medications   Medication Sig Dispense Refill    meloxicam (MOBIC) 15 mg tablet Take 1 Tab by mouth daily. 30 Tab 0       No Known Allergies    History reviewed. No pertinent surgical history. Social History     Socioeconomic History    Marital status: SINGLE     Spouse name: Not on file    Number of children: Not on file    Years of education: Not on file    Highest education level: Not on file   Occupational History    Not on file   Social Needs    Financial resource strain: Not on file    Food insecurity:     Worry: Not on file     Inability: Not on file    Transportation needs:     Medical: Not on file     Non-medical: Not on file   Tobacco Use    Smoking status: Former Smoker     Types: Cigarettes    Smokeless tobacco: Never Used   Substance and Sexual Activity    Alcohol use: No    Drug use: No    Sexual activity: Not on file   Lifestyle    Physical activity:     Days per week: Not on file     Minutes per session: Not on file    Stress: Not on file   Relationships    Social connections:     Talks on phone: Not on file     Gets together: Not on file     Attends Worship service: Not on file     Active member of club or organization: Not on file     Attends meetings of clubs or organizations: Not on file     Relationship status: Not on file    Intimate partner violence:     Fear of current or ex partner: Not on file     Emotionally abused: Not on file     Physically abused: Not on file     Forced sexual activity: Not on file   Other Topics Concern    Not on file   Social History Narrative    Not on file       Visit Vitals  BP (!) 153/92   Pulse 87   Temp 97.3 °F (36.3 °C) (Oral)   Resp 16   Ht 6' 1\" (1.854 m)   Wt 178 lb 9.6 oz (81 kg)   SpO2 97%   BMI 23.56 kg/m²         PHYSICAL EXAMINATION:  GENERAL: Alert and oriented x3, in no acute distress, well-developed, well-nourished, afebrile. HEART: No JVD.   EYES: No scleral icterus NECK: No significant lymphadenopathy   LUNGS: No respiratory compromise or indrawing  ABDOMEN: Soft, non-tender, non-distended. Electronically signed by:  Deepika Hunt MD

## 2019-11-12 ENCOUNTER — TELEPHONE (OUTPATIENT)
Dept: ORTHOPEDIC SURGERY | Age: 59
End: 2019-11-12

## 2019-11-18 ENCOUNTER — HOSPITAL ENCOUNTER (OUTPATIENT)
Dept: PHYSICAL THERAPY | Age: 59
End: 2019-11-18

## 2019-11-19 ENCOUNTER — TELEPHONE (OUTPATIENT)
Dept: PHYSICAL THERAPY | Age: 59
End: 2019-11-19

## 2019-11-19 ENCOUNTER — HOSPITAL ENCOUNTER (OUTPATIENT)
Dept: PHYSICAL THERAPY | Age: 59
Discharge: HOME | End: 2019-11-19
Payer: MEDICAID

## 2019-11-19 ENCOUNTER — TELEPHONE (OUTPATIENT)
Dept: ORTHOPEDIC SURGERY | Age: 59
End: 2019-11-19

## 2019-11-19 PROCEDURE — 97140 MANUAL THERAPY 1/> REGIONS: CPT

## 2019-11-19 PROCEDURE — 97161 PT EVAL LOW COMPLEX 20 MIN: CPT

## 2019-11-19 PROCEDURE — 97530 THERAPEUTIC ACTIVITIES: CPT

## 2019-11-19 NOTE — PROGRESS NOTES
[facility]  [address] [place] [address]  [phone] fax    Plan of Care/ Statement of Necessity for Physical Therapy Services    Patient name: Jaelyn Ellis Start of Care: 2019   Referral source: Lenka Bustamante MD : 1960    Medical Diagnosis: Degeneration of cervical intervertebral disc [M50.30]  Other cervical disc degeneration, unspecified cervical region [M50.30]  Payor: [facility] MEDICAID / Plan: [facility] COMMUNITY PLAN [facility] / Product Type: Managed Care Medicaid /  Onset Date: aggravated about several weeks ago    Treatment Diagnosis: Neck pain with radicular symptoms with Right UE   Prior Hospitalization: see medical history Provider#: [id number]   Medications: Verified on Patient summary List    Comorbidities: depression, arthritis, weight change of more than 10 lbs, HTN, visual impaired   Prior Level of Function: right handed, heavy lifting/construction work      The Plan of Care and following information is based on the information from the initial evaluation. Assessment/ marie information: Mr. Marin Vaughn is a 62 y/o, M pt with CC of neck pain with radicular symptoms along right UE, aggravated with Right c/s rotation and flex. Symptoms is worst along C6 dermatomes, decreased light touch. Mumbness and tingling improved with left lat flex and c/s distraction during manual today. Imaging done including c/s X-ray, showing mod decreased disc height at C3-4, mild decrease at C4-5, C5-6, C6-7; endplate osteophytes at these levels of decreased disc height, most pronounced at C6-7; and straightening of the c/s lordotic curvature. Pt present with very poor posture with increased lower c/s flex, and mod slouching. (+) with Spurling to Right side, fair DNF strength and poor ROM of c/s retraction and upper c/s flex.  Positive trigger points and tightness of Right UT and suboccipital muscles with good relief of numbness symptoms during manual. WNL with myotomes screening. Pt also reports LBP and Left hip pain, awaiting for surgery/THR next year. Pt would benefit from skilled PT to address these deficits above for pain free functional mobility. Evaluation Complexity History HIGH Complexity :3+ comorbidities / personal factors will impact the outcome/ POC ; Examination MEDIUM Complexity : 3 Standardized tests and measures addressing body structure, function, activity limitation and / or participation in recreation  ;Presentation LOW Complexity : Stable, uncomplicated  ;Clinical Decision Making LOW Complexity : FOTO score of   Overall Complexity Rating: LOW   Problem List: pain affecting function, decrease ROM, decrease strength, edema affecting function, decrease ADL/ functional abilitiies, decrease activity tolerance, decrease flexibility/ joint mobility and decrease transfer abilities   Treatment Plan may include any combination of the following: Therapeutic exercise, Therapeutic activities, Neuromuscular re-education, Physical agent/modality, Manual therapy, Patient education, Self Care training, Functional mobility training and Home safety training  Patient / Family readiness to learn indicated by: asking questions, trying to perform skills and interest  Persons(s) to be included in education: patient (P)  Barriers to Learning/Limitations: None  Patient Goal (s): reduce of pain, feel better  Patient Self Reported Health Status: poor  Rehabilitation Potential: good    Short term goals: To be accomplished within 1 week  1. Pt will be independent with HEP to maintain progression. Eval status: good understanding     Long term goals: To be accomplished within 8 weeks  1. Pt will improve FOTO score by 13 points to 45/100 to show improvement with functional mobility performance. Eval status: 32     2. Pt will report at least 50% improvement with radicular symptoms to improve his QOL.   Eval status: constant numbness along right shoulder and UE, worst with C6 distribution, relief with distraction and Left lat flex but poor long term carry over.     3. Pt will be able to perform DNF (tuck and lift) for more than 20 seconds to improve strength of c/s for more comfortable ADLs performance  Eval status: 12 sec with SCM compensation and mod fatigued    Frequency / Duration: Patient to be seen 2-3 times per week for 4 weeks. Patient/ Caregiver education and instruction: Diagnosis, prognosis, self care, activity modification, brace/ splint application and exercises   [x]  Plan of care has been reviewed with PTA    Certification Period: 11- to 12-    Thiago Plaza, PT 11/19/2019 10:47 AM    ________________________________________________________________________    I certify that the above Therapy Services are being furnished while the patient is under my care. I agree with the treatment plan and certify that this therapy is necessary.     Physician's Signature:____________Date:_________TIME:________    ** Signature, Date and Time must be completed for valid certification **    Please sign and return to [facility]  [address] [place] [address]  [phone] fax

## 2019-11-19 NOTE — PROGRESS NOTES
PT DAILY TREATMENT NOTE/CERVICAL [id number]    Patient Name: Karson Enter  Date:2019  : 1960  [x]  Patient  Verified  Payor: Arnaldo Abarca / Plan: [facility] COMMUNITY PLAN [employer] / Product Type: Managed Care Medicaid /    In time: 11:02  Out time:11:40  Total Treatment Time (min): 38  Visit #: 1 of 24    Medicare/BCBS Only   Total Timed Codes (min):  23 1:1 Treatment Time:  38     Treatment Area: Degeneration of cervical intervertebral disc [M50.30]  Other cervical disc degeneration, unspecified cervical region [M50.30]    SUBJECTIVE  Pain Level (0-10 scale): 1/10 with neck, worst with numbness of Right UE  []constant []intermittent []improving []worsening []no change since onset    Any medication changes, allergies to medications, adverse drug reactions, diagnosis change, or new procedure performed?: [x] No    [] Yes (see summary sheet for update)  Subjective functional status/changes:     PLOF: right handed, heavy lifting/construction work  Limitations to PLOF:   Mechanism of Injury:   Current symptoms/Complaints: Mr. Elizabeth Aguilera is a 64 y/o, M pt with CC of neck pain with radicular symptoms along right UE, aggravated with Right c/s rotation and flex. Symptoms is worst along C6 dermatomes, decreased light touch. Mumbness and tingling improved with left lat flex and c/s distraction during manual today. Imaging done including c/s X-ray, showing mod decreased disc height at C3-4, mild decrease at C4-5, C5-6, C6-7; endplate osteophytes at these levels of decreased disc height, most pronounced at C6-7; and straightening of the c/s lordotic curvature.    Previous Treatment/Compliance:   PMHx/Surgical Hx:   Work Hx:   Living Situation:   Pt Goals: \"reduce of pain, feel better\"  Barriers: []pain []financial []time []transportation []other  Motivation:   Substance use: []Alcohol []Tobacco []other:   FABQ Score: []low []elevate  Cognition: A & O x     Other:    OBJECTIVE/EXAMINATION  Domestic Life: Activity/Recreational Limitations: Mobility:   Self Care:       14 min [x]Eval                  []Re-Eval       10 min Therapeutic Activity:  [x]  See flow sheet :Pt edu within scope of practice on prognosis, POC, c/s anatomy, modalities use, posture, HEP review. Rationale: increase ROM, increase strength, improve coordination and increase proprioception  to improve the patients ability to perform ADls with ease and safety     13 min Manual Therapy:  C/s distraction, TPR for Right UT, SOR, PROM for OA flex   Rationale: decrease pain, increase ROM, increase tissue extensibility and decrease trigger points to improve ease with ADLs. With   [] TE   [] TA   [] neuro   [] other: Patient Education: [x] Review HEP    [] Progressed/Changed HEP based on:   [] positioning   [] body mechanics   [] transfers   [] heat/ice application    [] other:      Other Objective/Functional Measures:     Physical Therapy Evaluation Cervical Spine     SUBJECTIVE  Chief Complaint:    Mechanism of injury:    Symptoms  Aggravated by:   [x] Bending toward Right [] Sitting [] Standing [] Reaching Overhead   [] Moving [] Cough [] Sneeze [] Eating   [] AM  [] PM  Lying:  [] sup   [] pro   [] sidelying   [] Other:     Eased by:    [] Bending [] Sitting [] Standing Lying: [] sup  [] pro  [] sidelying   [] Moving [] AM  [] PM  [] Other:     General Health:  Red Flags Indicated? [] Yes    [] No  [] Yes [] No Recent weight change (If yes, due to dieting?  [] Yes  [] No)   [] Yes [] No Persistent cough  [] Yes [] No Unremitting pain at night  [] Yes [] No Dizziness  [] Yes [] No Blurred vision  [] Yes [] No Hands more cold or painful in cold weather  [] Yes [x] No Ringing in ears  [] Yes [x] No Difficulty swallowing  [] Yes [] No Dysfunction of bowel or bladder  [] Yes [] No Recent illness within past 3 weeks (i.e, cold, flu)  [] Yes [x] No Jaw pain    Past History/Treatments:    Diagnostic Tests: [] Lab work [x] X-rays    [] CT [] MRI     [] Other:  Results: please see electronic record    Functional Status  Prior level of function:  Present functional limitations:  What position do you sleep in?:    Headaches: Do you have headaches? [] Yes   [] No  How often do you get headaches? How long does the headache last?  What aggravates it? What relieves it? Does the headache coincide with any other symptoms (visual disturbances, light sensitivity)? Where is the headache? Does it change locations?   Other:    OBJECTIVE  Posture: [] WNL  Head Position:  Shoulder/Scapular Position:  C-Kyphosis:  [] increased   [] decreased   C-Lordosis:   [] increased   [x] decreased  T-Kyphosis:  [] increased   [] decreased  T-Lordosis:   [] increased   [] decreased     TMJ: [] N/A [] Abnormal - ROM:   Palpation:    Cervical Retraction: [] WNL    [x] Abnormal:    Shoulder/Scapular Screen: [x] WNL with AROM and strength    [] Abnormal:    Active Movements: [] N/A   [] Too acute   [] Other:  ROM % AROM % PROM Comments:pain, area   Forward flexion WNL but mostly form lower c/s     Extension 50%     SB right 50%     SB left  WNL     Rotation right 60%     Rotation left WNL       Thoracic Spine: [] N/A    [] WNL   [] Other:    PROM:    Palpation:  [] Min  [x] Mod  [] Severe    Location: tightness and pain with positive trigger point along right UT  [] Min  [] Mod  [] Severe    Location:  [] Min  [] Mod  [] Severe    Location:    Neuro Screen (myotome/dematome/felexes): [x] WNL with myotomes but decreased with dermatome     Myotome Level Muscle Test Myotome Level Muscle Test   C5 Shoulder Adduction - Deltoid C8 Finger Flexors   C6 Wrist Extension T1 Finger Abduction - Interossei   C7 Elbow Extension     Comments:  Upper Limb Tension Tests: [] N/A       Ulnar: [] R    [] L    [] +    [] -       Median: [] R    [] L    [] +    [] -       Radial: [] R    [] L    [] +    [] -    Special Tests:  Cervical:        Vertebral Artery:  [] R    [] L    [] +    [] -       Alar Ligament: [] R    [] L    [] +    [] -       Transverse Lig: [] R    [] L    [] +    [] -       Spurling's:  [x] R    [] L    [x] +    [] -       Distraction:  [] R    [] L    [] +    [] -       Compression: [] R    [] L    [] +    [] -    Thoracic Outlet Tests: [] N/A       Adson's:  [] R    [] L    [] +    [] -       Hyperabduction: [] R    [] L    [] +    [] -       Adi's:  [] R    [] L    [] +    [] -       Monica:  [] R    [] L    [] +    [] -    Diaphragmatic Breathing: [] Normal    [] Abnormal    Muscle Flexibility: [] N/A   Scalenes: [] WNL    [x] Tight    [] R    [] L   Upper Trap: [] WNL    [x] Tight    [] R    [] L   Levator: [] WNL    [x] Tight    [] R    [] L   Pect. Minor: [] WNL    [x] Tight    [] R    [] L    Global Muscular Weakness: [] N/A   Lower Trap: fair strength   Rhomboids:   Middle Trap:   Serratus Ant:   Ext Rotators: Other:    Other tests/comments:       Pain Level (0-10 scale) post treatment: 0/10, numbness returned quickly after manual distraction    ASSESSMENT/Changes in Function: see POC    Patient will continue to benefit from skilled PT services to modify and progress therapeutic interventions, address functional mobility deficits, address ROM deficits, address strength deficits, analyze and address soft tissue restrictions, analyze and cue movement patterns, analyze and modify body mechanics/ergonomics, assess and modify postural abnormalities and instruct in home and community integration to attain remaining goals.      [x]  See Plan of Care  []  See progress note/recertification  []  See Discharge Summary         Progress towards goals / Updated goals:  See POC    PLAN  [x]  Upgrade activities as tolerated     [x]  Continue plan of care  []  Update interventions per flow sheet       []  Discharge due to:_  []  Other:_    Ronaldo Small, PT 11/19/2019  10:45 AM

## 2019-11-19 NOTE — TELEPHONE ENCOUNTER
Patient called checking on the status of his disability letter. He advised he needs a letter stating [facility]'s findings after sx immediately. He also advised we are not working with him and he is going to find another surgeon. Patient disconnected. I advised would put in message. I didn't have a chance to ask clarifying questions - I dont know what he means since sx isn't until January?       [phone]

## 2019-11-20 ENCOUNTER — OFFICE VISIT (OUTPATIENT)
Dept: ORTHOPEDIC SURGERY | Facility: CLINIC | Age: 59
End: 2019-11-20

## 2019-11-20 VITALS
HEIGHT: 73 IN | RESPIRATION RATE: 16 BRPM | WEIGHT: 183 LBS | BODY MASS INDEX: 24.25 KG/M2 | SYSTOLIC BLOOD PRESSURE: 158 MMHG | DIASTOLIC BLOOD PRESSURE: 96 MMHG | TEMPERATURE: 96.8 F | HEART RATE: 66 BPM

## 2019-11-20 DIAGNOSIS — M16.12 PRIMARY OSTEOARTHRITIS OF LEFT HIP: ICD-10-CM

## 2019-11-20 DIAGNOSIS — M25.562 LEFT KNEE PAIN, UNSPECIFIED CHRONICITY: ICD-10-CM

## 2019-11-20 DIAGNOSIS — M17.0 PRIMARY OSTEOARTHRITIS OF BOTH KNEES: Primary | ICD-10-CM

## 2019-11-20 RX ORDER — BETAMETHASONE SODIUM PHOSPHATE AND BETAMETHASONE ACETATE 3; 3 MG/ML; MG/ML
6 INJECTION, SUSPENSION INTRA-ARTICULAR; INTRALESIONAL; INTRAMUSCULAR; SOFT TISSUE ONCE
Qty: 1 ML | Refills: 0
Start: 2019-11-20 | End: 2019-11-20

## 2019-11-20 NOTE — PROGRESS NOTES
1. Have you been to the ER, urgent care clinic since your last visit? Hospitalized since your last visit? Yes, [facility] ED    2. Have you seen or consulted any other health care providers outside of the [address] [place] [address] since your last visit? Include any pap smears or colon screening.    Yes, see above

## 2019-11-20 NOTE — PROGRESS NOTES
Patient: Pilar Peters                MRN: [id number]       SSN: [id number]  YOB: 1960        AGE: 63 y.o. SEX: male  Body mass index is 24.14 kg/m². PCP: None  11/20/19  HISTORY:  I saw Mr. Joelle Schilder in follow-up. He has likely a 4- to 5-year history of back problems, hip problems, knee problems, worse on the left than on the right. In fact, he is scheduled for surgery on the left. He is basically self-employed due to his inability to sit for any prolonged period of time; he cannot sit for longer than about 30 minutes. He had some therapy for his neck and back and is under care of a physician for this. I am also seeing him for significant left hip pain. Some days it is very severe. He has less than a 10-minute-level walking tolerance. He cannot do ladders or stairs. Bending over type of work is very difficult. Squatting he cannot do. In fact, he has really had to give up on a lot of work for this and having trouble paying the bills as well. It has gotten worse. We have him scheduled for surgery in the new year. Also complaining of left knee pain. Moderate aching non-radicular, worse with stairs, kneeling, getting up and down from a chair, also limiting of his walking. REVIEW OF SYSTEMS:  A 12-point review of systems performed today pertinent and positive noted. All other systems reviewed and are negative. PHYSICAL EXAMINATION:  Today:  Walks poorly on the left side. He is in varus with the left knee. The left hip is very stiff. He has got a couple-degree fixed flexion deformity and mild external rotation contracture of a few degrees. Almost no internal rotation. Low back is somewhat tender for him. The neck is a little bit sore, but he has good motion. Good  strength in the hands. The skin is normal.  No cyanosis, peripheral edema, or clubbing. No JVD. His low back is a little bit achy around L4 itself.   The knee itself, couple-degree fixed flexion deformity. Slight effusion. No evidence for infection or DVT. There is slight evidence of neuropathy with sharp testing. RADIOGRAPHS:  X-rays confirm end-stage arthritis of the left hip, likely bad for at least 4 or 5 years I would expect. X-rays of the knees today, as an addendum, both knees are bothering him and we have obtained x-rays today which shows the right knee has end-stage arthritis with a valgus collapse pattern. The left knee has advanced arthritis with significant arthritis in it lateral, and actually the left knee has end-stage patellofemoral disease, very severe, especially on the lateral view. PLAN:  At this point, the patient obviously cannot do his current duties, is self-employed in construction and woodworking. Light duties are not available for him. He may require some assistance. I think after his surgery with his multiple medical problems, I think he will be able to work after his hip replacement, but I do think he is going to have significant limitations with regard to all his other body part issues. I think that light duties after surgery will be indicated for him and likely be permanent as well. PROCEDURE:  Under aseptic conditions and after informed, written consent with a time out, each of the 2 knees were injected with 1 mL of the Celestone preparation, i.e. 6 mg, well tolerated. I would expect him to require knee surgery in the next couple of years as well. CC:  Family Medicine  CC:  Disability Claims          REVIEW OF SYSTEMS:      CON: negative for weight loss, fever  EYE: negative for double vision  ENT: negative for hoarseness  RS:   negative for Tb  GI:    negative for blood in stool  :  negative for blood in urine  Other systems reviewed and noted below.           Past Medical History:   Diagnosis Date    Anemia     Bilateral sciatica     Chronic pain     Herniated intervertebral disc of lumbar spine     Hypertension     Muscle spasm of back        Family History   Problem Relation Age of Onset    Hypertension Mother     Cancer Mother     Cancer Father     Diabetes Father        Current Outpatient Medications   Medication Sig Dispense Refill    meloxicam (MOBIC) 15 mg tablet Take 1 Tab by mouth daily. 30 Tab 0       No Known Allergies    History reviewed. No pertinent surgical history.     Social History     Socioeconomic History    Marital status: SINGLE     Spouse name: Not on file    Number of children: Not on file    Years of education: Not on file    Highest education level: Not on file   Occupational History    Not on file   Social Needs    Financial resource strain: Not on file    Food insecurity:     Worry: Not on file     Inability: Not on file    Transportation needs:     Medical: Not on file     Non-medical: Not on file   Tobacco Use    Smoking status: Former Smoker     Types: Cigarettes    Smokeless tobacco: Never Used   Substance and Sexual Activity    Alcohol use: No    Drug use: No    Sexual activity: Not on file   Lifestyle    Physical activity:     Days per week: Not on file     Minutes per session: Not on file    Stress: Not on file   Relationships    Social connections:     Talks on phone: Not on file     Gets together: Not on file     Attends [employer] service: Not on file     Active member of club or organization: Not on file     Attends meetings of clubs or organizations: Not on file     Relationship status: Not on file    Intimate partner violence:     Fear of current or ex partner: Not on file     Emotionally abused: Not on file     Physically abused: Not on file     Forced sexual activity: Not on file   Other Topics Concern    Not on file   Social History Narrative    Not on file       Visit Vitals  BP (!) 158/96 (BP 1 Location: Left arm, BP Patient Position: Sitting)   Pulse 66   Temp 96.8 °F (36 °C) (Oral)   Resp 16   Ht 6' 1\" (1.854 m)   Wt 183 lb (83 kg)   BMI 24.14 kg/m²         PHYSICAL EXAMINATION:  GENERAL: Alert and oriented x3, in no acute distress, well-developed, well-nourished, afebrile. HEART: No JVD. EYES: No scleral icterus   NECK: No significant lymphadenopathy   LUNGS: No respiratory compromise or indrawing  ABDOMEN: Soft, non-tender, non-distended. Electronically signed by:  Tiffanie Keith MD

## 2019-11-26 ENCOUNTER — HOSPITAL ENCOUNTER (OUTPATIENT)
Dept: PHYSICAL THERAPY | Age: 59
Discharge: HOME | End: 2019-11-26
Payer: MEDICAID

## 2019-11-26 PROCEDURE — 97140 MANUAL THERAPY 1/> REGIONS: CPT

## 2019-11-26 PROCEDURE — 97110 THERAPEUTIC EXERCISES: CPT

## 2019-11-26 PROCEDURE — 97012 MECHANICAL TRACTION THERAPY: CPT

## 2019-11-26 NOTE — PROGRESS NOTES
PT DAILY TREATMENT NOTE 10-18    Patient Name: Jonathan Burton Dent  Date:2019  : 1960  [x]  Patient  Verified  Payor: [facility] MEDICAID / Plan: [facility] [employer] [place] [employer] / Product Type: Managed Care Medicaid /    In time:1030  Out time:1122  Total Treatment Time (min): 52  Visit #: 2 of 12    Treatment Area: Cervicalgia [M54.2]  Pain of right upper extremity [M79.601]  Degeneration of cervical intervertebral disc [M50.30]    SUBJECTIVE  Pain Level (0-10 scale): 8/10  Any medication changes, allergies to medications, adverse drug reactions, diagnosis change, or new procedure performed?: [x] No    [] Yes (see summary sheet for update)  Subjective functional status/changes:   [] No changes reported  Pt stated that he is having quite a bit of pain today    OBJECTIVE    Modality rationale: decrease pain and increase tissue extensibility to improve the patients ability to increase ease with ADLs   Min Type Additional Details    [] Estim:  []Unatt       []IFC  []Premod                        []Other:  []w/ice   []w/heat  Position:  Location:    [] Estim: []Att    []TENS instruct  []NMES                    []Other:  []w/US   []w/ice   []w/heat  Position:  Location:   15 []  Traction: [x] Cervical       []Lumbar                       [] Prone          [x]Supine                       [x]Intermittent   []Continuous Lbs: 12/8  [] before manual  [x] after manual    []  Ultrasound: []Continuous   [] Pulsed                           []1MHz   []3MHz W/cm2:  Location:    []  Iontophoresis with dexamethasone         Location: [] Take home patch   [] In clinic    []  Ice     []  heat  []  Ice massage  []  Laser   []  Anodyne Position:  Location:    []  Laser with stim  []  Other:  Position:  Location:    []  Vasopneumatic Device Pressure:       [] lo [] med [] hi   Temperature: [] lo [] med [] hi       27 min Therapeutic Exercise:  [] See flow sheet :   Rationale: increase ROM and increase strength to improve the patients ability to increase ease with ADLs    10 min Manual Therapy:  DTM to cervical paraspinals, UT and sub-occipitals   Rationale: decrease pain, increase ROM and increase tissue extensibility to increase ease with ADLs      With   [x] TE   [] TA   [] neuro   [] other: Patient Education: [x] Review HEP    [] Progressed/Changed HEP based on:   [] positioning   [] body mechanics   [] transfers   [] heat/ice application    [] other:      Other Objective/Functional Measures:   Had no complaint of increased pain during session   Had mild tightness in cervical musculature that resolved with manual     Pain Level (0-10 scale) post treatment: 0/10    ASSESSMENT/Changes in Function:   Initiated therex today per flow sheet. Pt put forth good effort with all exercises. Pt reported compliance with HEP    Patient will continue to benefit from skilled PT services to modify and progress therapeutic interventions, address functional mobility deficits, address ROM deficits, address strength deficits, analyze and address soft tissue restrictions, analyze and cue movement patterns, analyze and modify body mechanics/ergonomics, assess and modify postural abnormalities and instruct in home and community integration to attain remaining goals. [x]  See Plan of Care  []  See progress note/recertification  []  See Discharge Summary         Progress towards goals / Updated goals:  Short term goals: To be accomplished within 1 week  1. Pt will be independent with HEP to maintain progression. Goal met. 11/26/19     Long term goals: To be accomplished within 8 weeks  1. Pt will improve FOTO score by 13 points to 45/100 to show improvement with functional mobility performance. Eval status: 32     2. Pt will report at least 50% improvement with radicular symptoms to improve his QOL.   Eval status: constant numbness along right shoulder and UE, worst with C6 distribution, relief with distraction and Left lat flex but poor long term carry over.     3.  Pt will be able to perform DNF (tuck and lift) for more than 20 seconds to improve strength of c/s for more comfortable ADLs performance  Eval status: 12 sec with SCM compensation and mod fatigued    PLAN  []  Upgrade activities as tolerated     [x]  Continue plan of care  []  Update interventions per flow sheet       []  Discharge due to:_  []  Other:_      Reba Aguero, PTA 11/26/2019  10:32 AM    Future Appointments   Date Time Provider Wanda Chavez   12/2/2019 10:30 AM Burton Hebert, PTA MMCPTPB [facility]   12/4/2019  9:30 AM Burton Hebert, PTA MMCPTPB [facility]   12/9/2019 10:00 AM Neo Topete, PT MMCPTPB [facility]   12/11/2019  9:30 AM Nohemi Thorpe, PT [id number] [facility]   12/16/2019  9:30 AM Neo Topete, PT [id number] [facility]   12/18/2019  9:30 AM Burton Hebert, PTA MMCPTPB [facility]   12/23/2019 10:00 AM Neo Topete, PT POXROSV [facility]   12/27/2019  9:30 AM Burton Hebetr, PTA MMCPTPB [facility]   12/30/2019  9:30 AM Burton Hebert, PTA MMCPTPB [facility]   1/3/2020  9:30 AM Burton Hebert, PTA MMCPTPB [facility]   1/6/2020  9:00 AM Rylie Bryant PA-C [facility] CHANDLER SCHED   1/8/2020  8:30 AM KANE Silver CH[facility] SCHED   1/9/2020  2:30 PM Laura Zavala MD [address] [facility]   1/10/2020 12:00 PM [facility] PSAT RM 6 CRMCORPAT [facility]   1/30/2020  2:10 PM KANE Silver 70

## 2019-12-02 ENCOUNTER — HOSPITAL ENCOUNTER (OUTPATIENT)
Dept: PHYSICAL THERAPY | Age: 59
Discharge: HOME | End: 2019-12-02
Payer: MEDICAID

## 2019-12-02 PROCEDURE — 97110 THERAPEUTIC EXERCISES: CPT

## 2019-12-02 PROCEDURE — 97140 MANUAL THERAPY 1/> REGIONS: CPT

## 2019-12-02 PROCEDURE — 97012 MECHANICAL TRACTION THERAPY: CPT

## 2019-12-02 NOTE — PROGRESS NOTES
PT DAILY TREATMENT NOTE 10-18    Patient Name: Irineo Sauer  Date:2019  : 1960  [x]  Patient  Verified  Payor: [facility] MEDICAID / Plan: [facility] [employer] [employer] / Product Type: Managed Care Medicaid /    In time:1030  Out time:1117  Total Treatment Time (min): 52  Visit #: 3 of 12    Treatment Area: Neck pain [M54.2]  Pain of right upper extremity [M79.601]  Degeneration of cervical intervertebral disc [M50.30]    SUBJECTIVE  Pain Level (0-10 scale): 8/10  Any medication changes, allergies to medications, adverse drug reactions, diagnosis change, or new procedure performed?: [x] No    [] Yes (see summary sheet for update)  Subjective functional status/changes:   [] No changes reported  Pt stated that he has a lot of pain today, probably because of the long car ride he took over the holidays    OBJECTIVE    Modality rationale: decrease pain and increase tissue extensibility to improve the patients ability to increase ease with ADLs   Min Type Additional Details    [] Estim:  []Unatt       []IFC  []Premod                        []Other:  []w/ice   []w/heat  Position:  Location:    [] Estim: []Att    []TENS instruct  []NMES                    []Other:  []w/US   []w/ice   []w/heat  Position:  Location:   15 [x]  Traction: [x] Cervical       []Lumbar                       [] Prone          [x]Supine                       [x]Intermittent   []Continuous Lbs: 14/9  [] before manual  [x] after manual    []  Ultrasound: []Continuous   [] Pulsed                           []1MHz   []3MHz W/cm2:  Location:    []  Iontophoresis with dexamethasone         Location: [] Take home patch   [] In clinic    []  Ice     []  heat  []  Ice massage  []  Laser   []  Anodyne Position:  Location:    []  Laser with stim  []  Other:  Position:  Location:    []  Vasopneumatic Device Pressure:       [] lo [] med [] hi   Temperature: [] lo [] med [] hi   [] Skin assessment post-treatment:  []intact []redness- no adverse reaction    []redness - adverse reaction:     24 min Therapeutic Exercise:  [x] See flow sheet :   Rationale: increase ROM and increase strength to improve the patients ability to increase ease with ADLs    8 min Manual Therapy:  DTM to cervical paraspinals and sub-occipitals   Rationale: decrease pain, increase ROM and increase tissue extensibility to increase ease with ADLs    With   [x] TE   [] TA   [] neuro   [] other: Patient Education: [x] Review HEP    [] Progressed/Changed HEP based on:   [] positioning   [] body mechanics   [] transfers   [] heat/ice application    [] other:      Other Objective/Functional Measures:   Had no difficulty with exercises  Had minimal tightness in cervical musculature with no discernible trigger points     Pain Level (0-10 scale) post treatment: 0/10    ASSESSMENT/Changes in Function:   Pt is slowly progressing toward goals. Pt reported that he only has occasional radicular sx's. Pt cont with significant pain in the neck. Patient will continue to benefit from skilled PT services to modify and progress therapeutic interventions, address functional mobility deficits, address ROM deficits, address strength deficits, analyze and address soft tissue restrictions, analyze and cue movement patterns, analyze and modify body mechanics/ergonomics, assess and modify postural abnormalities and instruct in home and community integration to attain remaining goals. [x]  See Plan of Care  []  See progress note/recertification  []  See Discharge Summary         Progress towards goals / Updated goals:  Short term goals: To be accomplished within 1 week  1. Pt will be independent with HEP to maintain progression. Goal met. 11/26/19     Long term goals: To be accomplished within 8 weeks  1. Pt will improve FOTO score by 13 points to 45/100 to show improvement with functional mobility performance. Eval status: 32   2.  Pt will report at least 50% improvement with radicular symptoms to improve his QOL.   Eval status: constant numbness along right shoulder and UE, worst with C6 distribution, relief with distraction and Left lat flex but poor long term carry over.   3. Pt will be able to perform DNF (tuck and lift) for more than 20 seconds to improve strength of c/s for more comfortable ADLs performance  Eval status: 12 sec with SCM compensation and mod fatigued    PLAN  []  Upgrade activities as tolerated     [x]  Continue plan of care  []  Update interventions per flow sheet       []  Discharge due to:_  []  Other:_      Barbara Baxter, ROBERTO 12/2/2019  10:28 AM    Future Appointments   Date Time Provider Wanda Chavez   12/2/2019 10:30 AM Bayview Reyes, PTA MMCPTPB [facility]   12/4/2019  9:30 AM Bayview Reyes, PTA MMCPTPB [facility]   12/9/2019 10:00 AM Griselda Palomares, PT MMCPTPB [facility]   12/11/2019  9:30 AM Radha Mims, PT [id number] [facility]   12/16/2019  9:30 AM Griselda Palomares, PT [id number] [facility]   12/18/2019  9:30 AM Bayview Reyes, PTA MMCPTPB [facility]   12/23/2019 10:00 AM Griselda Palomares, PT [id number] [facility]   12/27/2019  9:30 AM Bayview Reyes, PTA MMCPTPB [facility]   12/30/2019  9:30 AM Bayview Reyes, PTA MMCPTPB [facility]   1/3/2020  9:30 AM Bayview Reyes, PTA MMCPTPB [facility]   1/6/2020  9:00 AM Carroll Prader, PA-C [facility] CHANDLER SCHED   1/8/2020  8:30 AM Carroll Prader, PA-C [facility] CHANDLER SCHED   1/9/2020  2:30 PM Davina Mohan MD [address] [facility]   1/10/2020 12:00  [facility] PSAT RM 6 CRMCORPAT [address] [facility]   1/30/2020  2:10 PM Carroll Prader, PA-C [address]

## 2019-12-04 ENCOUNTER — HOSPITAL ENCOUNTER (OUTPATIENT)
Dept: PHYSICAL THERAPY | Age: 59
Discharge: HOME | End: 2019-12-04
Payer: MEDICAID

## 2019-12-04 PROCEDURE — 97140 MANUAL THERAPY 1/> REGIONS: CPT

## 2019-12-04 PROCEDURE — 97012 MECHANICAL TRACTION THERAPY: CPT

## 2019-12-04 PROCEDURE — 97110 THERAPEUTIC EXERCISES: CPT

## 2019-12-04 NOTE — PROGRESS NOTES
PT DAILY TREATMENT NOTE 10-18    Patient Name: Job Rich  Date:2019  : 1960  [x]  Patient  Verified  Payor: [facility] MEDICAID / Plan: [facility] [employer] / Product Type: Managed Care Medicaid /    In time:930  Out time:1015  Total Treatment Time (min): 45  Visit #: 4 of 12    Treatment Area: Neck pain [M54.2]  Pain of right upper extremity [M79.601]  Degeneration of cervical intervertebral disc [M50.30]    SUBJECTIVE  Pain Level (0-10 scale): 7/10  Any medication changes, allergies to medications, adverse drug reactions, diagnosis change, or new procedure performed?: [x] No    [] Yes (see summary sheet for update)  Subjective functional status/changes:   [] No changes reported  Pt stated that he is blessed    OBJECTIVE    Modality rationale: decrease pain and increase tissue extensibility to improve the patients ability to increase ease with ADLs   Min Type Additional Details    [] Estim:  []Unatt       []IFC  []Premod                        []Other:  []w/ice   []w/heat  Position:  Location:    [] Estim: []Att    []TENS instruct  []NMES                    []Other:  []w/US   []w/ice   []w/heat  Position:  Location:   15 [x]  Traction: [x] Cervical       []Lumbar                       [] Prone          [x]Supine                       [x]Intermittent   []Continuous Lbs: 14/9  [] before manual  [x] after manual    []  Ultrasound: []Continuous   [] Pulsed                           []1MHz   []3MHz W/cm2:  Location:    []  Iontophoresis with dexamethasone         Location: [] Take home patch   [] In clinic    []  Ice     []  heat  []  Ice massage  []  Laser   []  Anodyne Position:  Location:    []  Laser with stim  []  Other:  Position:  Location:    []  Vasopneumatic Device Pressure:       [] lo [] med [] hi   Temperature: [] lo [] med [] hi   [x] Skin assessment post-treatment:  [x]intact []redness- no adverse reaction    []redness - adverse reaction:     22 min Therapeutic Exercise:  [x] See flow sheet :   Rationale: increase ROM and increase strength to improve the patients ability to increase ease with ADLs    8 min Manual Therapy:   DTM to cervical paraspinals and sub-occipitals   Rationale: decrease pain, increase ROM and increase tissue extensibility to increase ease with ADLs    With   [x] TE   [] TA   [] neuro   [] other: Patient Education: [x] Review HEP    [] Progressed/Changed HEP based on:   [] positioning   [] body mechanics   [] transfers   [] heat/ice application    [] other:      Other Objective/Functional Measures:   Had slight tightness in the left cervical paraspinals which resolved with manual     Pain Level (0-10 scale) post treatment: 0/10    ASSESSMENT/Changes in Function:   Pt is slowly progressing toward goals. Pt reports that the relief he is getting in therapy is lasting about 24 hours. Cont to have pain with sleeping and prolonged activity. Pt cont to report that the radicular sx's come and go    Patient will continue to benefit from skilled PT services to modify and progress therapeutic interventions, address functional mobility deficits, address ROM deficits, address strength deficits, analyze and address soft tissue restrictions, analyze and cue movement patterns, analyze and modify body mechanics/ergonomics, assess and modify postural abnormalities and instruct in home and community integration to attain remaining goals. [x]  See Plan of Care  []  See progress note/recertification  []  See Discharge Summary         Progress towards goals / Updated goals:  Short term goals: To be accomplished within 1 week  1. Pt will be independent with HEP to maintain progression. Goal met. 11/26/19     Long term goals: To be accomplished within 8 weeks  1. Pt will improve FOTO score by 13 points to 45/100 to show improvement with functional mobility performance. Eval status: 32   2.  Pt will report at least 50% improvement with radicular symptoms to improve his QOL.  Progressing.  12/4/19  Eval status: constant numbness along right shoulder and UE, worst with C6 distribution, relief with distraction and Left lat flex but poor long term carry over.   3. Pt will be able to perform DNF (tuck and lift) for more than 20 seconds to improve strength of c/s for more comfortable ADLs performance  Eval status: 12 sec with SCM compensation and mod fatigued    PLAN  []  Upgrade activities as tolerated     [x]  Continue plan of care  []  Update interventions per flow sheet       []  Discharge due to:_  []  Other:_      Luisa Black, ROBERTO 12/4/2019  9:32 AM    Future Appointments   Date Time Provider Wanda Chavez   12/9/2019 10:00 AM Gene Randle, PT MMCPTPB [facility]   12/11/2019  9:30 AM James Archuleta, PT [id number] [facility]   12/16/2019  9:30 AM Gene Randle, PT MMCPTPB [facility]   12/18/2019  9:30 AM Colleen Lout, PTA [id number] [facility]   12/23/2019 10:00 AM Gene Randle, PT MMCPTPB [facility]   12/27/2019  9:30 AM Colleen Lout, PTA MMCPTPB [facility]   12/30/2019  9:30 AM Colleen Lout, PTA MMCPTPB [facility]   1/3/2020  9:30 AM Colleen Lout, PTA MMCPTPB [facility]   1/6/2020  9:00 AM Mehdi Xavier PA-C [facility] CHANDLER SCHED   1/8/2020  8:30 AM Mehdi Xavier PA-C [facility] SCHED   1/9/2020  2:30 PM Sissy Talbot MD [address] [facility]   1/10/2020 12:00  [facility] PSAT RM 6 CRMCORPAT [address] [place] [address]   1/30/2020  2:10 PM KANE Mercera [address]

## 2019-12-09 ENCOUNTER — HOSPITAL ENCOUNTER (OUTPATIENT)
Dept: PHYSICAL THERAPY | Age: 59
End: 2019-12-09
Payer: MEDICAID

## 2019-12-09 ENCOUNTER — TELEPHONE (OUTPATIENT)
Dept: ORTHOPEDIC SURGERY | Facility: CLINIC | Age: 59
End: 2019-12-09

## 2019-12-09 DIAGNOSIS — M16.12 PRIMARY OSTEOARTHRITIS OF LEFT HIP: ICD-10-CM

## 2019-12-09 DIAGNOSIS — Z01.818 PREOP EXAMINATION: Primary | ICD-10-CM

## 2019-12-09 DIAGNOSIS — M16.12 PRIMARY OSTEOARTHRITIS OF LEFT HIP: Primary | ICD-10-CM

## 2019-12-09 NOTE — TELEPHONE ENCOUNTER
PLEASE PUT IN THE FOLLOWING DME ORDER AND FANY WILL FAX TO [facility], 3 IN 1 [employer] AND SHOWER [employer]AIR.

## 2019-12-11 ENCOUNTER — HOSPITAL ENCOUNTER (OUTPATIENT)
Dept: PHYSICAL THERAPY | Age: 59
Discharge: HOME | End: 2019-12-11
Payer: MEDICAID

## 2019-12-11 PROCEDURE — 97012 MECHANICAL TRACTION THERAPY: CPT

## 2019-12-11 PROCEDURE — 97110 THERAPEUTIC EXERCISES: CPT

## 2019-12-11 PROCEDURE — 97140 MANUAL THERAPY 1/> REGIONS: CPT

## 2019-12-11 NOTE — PROGRESS NOTES
PT DAILY TREATMENT NOTE - [facility]     Patient Name: Hansel Torres  Date:2019  : 1960  [x]  Patient  Verified  Payor: [facility] MEDICAID / Plan: [facility] [employer] / Product Type: Managed Care Medicaid /    In time:932  Out time:1027  Total Treatment Time (min): 55  Visit #: 5 of 12    Treatment Area: Neck pain [M54.2]  Pain of right upper extremity [M79.601]  Degeneration of cervical intervertebral disc [M50.30]    SUBJECTIVE  Pain Level (0-10 scale): 0/10  Any medication changes, allergies to medications, adverse drug reactions, diagnosis change, or new procedure performed?: [x] No    [] Yes (see summary sheet for update)  Subjective functional status/changes:   [] No changes reported  Numbness moved from right to left shoulder since last visit. OBJECTIVE    Modality rationale: decrease pain to improve the patients ability to ease with ADL's   Min Type Additional Details    [] Estim:  []Unatt       []IFC  []Premod                        []Other:  []w/ice   []w/heat  Position:  Location:    [] Estim: []Att    []TENS instruct  []NMES                    []Other:  []w/US   []w/ice   []w/heat  Position:  Location:   15 [x]  Traction: [x] Cervical       []Lumbar                       [] Prone          [x]Supine                       []Intermittent   []Continuous Lbs: 15/9  [x] before manual  [] after manual    []  Ultrasound: []Continuous   [] Pulsed                           []1MHz   []3MHz W/cm2:  Location:    []  Iontophoresis with dexamethasone         Location: [] Take home patch   [] In clinic    []  Ice     []  heat  []  Ice massage  []  Laser   []  Anodyne Position:  Location:    []  Laser with stim  []  Other:  Position:  Location:    []  Vasopneumatic Device Pressure:       [] lo [] med [] hi   Temperature: [] lo [] med [] hi   [] Skin assessment post-treatment:  []intact []redness- no adverse reaction        []redness - adverse reaction:   25 min Therapeutic Exercise:  [x]? See flow sheet :   Rationale: increase ROM and increase strength to improve the patients ability to increase ease with ADLs     8 min Manual Therapy:   DTM to cervical paraspinals and SOR   Rationale: decrease pain, increase ROM and increase tissue extensibility to increase ease with ADLs       With   [] TE   [] TA   [] neuro   [] other: Patient Education: [x] Review HEP    [] Progressed/Changed HEP based on:   [] positioning   [] body mechanics   [] transfers   [] heat/ice application    [] other:      Other Objective/Functional Measures: added green gripper for due to decrease  strength  Left >right. Added SNAGS ,  To improve with CS rotation. Right rotation=50 deg, left rotation=56 deg. Pain Level (0-10 scale) post treatment: 0/10    ASSESSMENT/Changes in Function: Progressing well. Pt tolerated increased weight in CS traction. Pt reports her is getting positive results from traction including sleeping better. He continues to have difficulty with looking in his \"blind spot\"    Patient will continue to benefit from skilled PT services to modify and progress therapeutic interventions, address functional mobility deficits, address ROM deficits, address strength deficits, analyze and address soft tissue restrictions, analyze and cue movement patterns, analyze and modify body mechanics/ergonomics and assess and modify postural abnormalities to attain remaining goals. []  See Plan of Care  []  See progress note/recertification  []  See Discharge Summary         Progress towards goals / Updated goals:  1. Pt will be independent with HEP to maintain progression. Goal met. 11/26/19     Long term goals: To be accomplished within 8 weeks  1. Pt will improve FOTO score by 13 points to 45/100 to show improvement with functional mobility performance. Eval status: 32   2. Pt will report at least 50% improvement with radicular symptoms to improve his QOL. Progressing.  12/4/19  Eval status: constant numbness along right shoulder and UE, worst with C6 distribution, relief with distraction and Left lat flex but poor long term carry over.   3. Pt will be able to perform DNF (tuck and lift) for more than 20 seconds to improve strength of c/s for more comfortable ADLs performance  Eval status: 12 sec with SCM compensation and mod fatigued    PLAN  []  Upgrade activities as tolerated     []  Continue plan of care  []  Update interventions per flow sheet       []  Discharge due to:_  []  Other:_      Kisha Sparrow, PT 12/11/2019  9:41 AM    Future Appointments   Date Time Provider Wanda Chavez   12/13/2019  9:30 AM Margarita Northern, PT MMCPTPB [facility]   12/16/2019  9:30 AM Margarita Res[facility], PT [id number] [facility]   12/18/2019  9:30 AM Phillip Carpenter PTA ORRNJAH [facility]   12/23/2019 10:00 AM Phillip Carpenter PTA MMCPTPB [facility]   12/27/2019  9:30 AM Phillip Carpenter, PTA MMCPTPB [facility]   12/30/2019  9:30 AM Phillip Carpenter, PTA MMCPTPB [facility]   1/3/2020  9:30 AM Phillip Carpenter, PTA MMCPTPB [facility]   1/6/2020  9:00 AM Lennox Schilling, PA-C [facility] CHANDLER SCHED   1/8/2020  8:30 AM Lennox Schilling, PA-C [facility] CHANDLER SCHED   1/9/2020  2:30 PM Ralph Lucas MD [address] [facility]   1/10/2020 12:00  [facility] PSAT RM 6 CRMCORPAT [address] [facility]   1/30/2020  2:10 PM Lennox Schilling, PA-C [address]

## 2019-12-13 ENCOUNTER — APPOINTMENT (OUTPATIENT)
Dept: PHYSICAL THERAPY | Age: 59
End: 2019-12-13
Payer: MEDICAID

## 2019-12-16 ENCOUNTER — HOSPITAL ENCOUNTER (OUTPATIENT)
Dept: PHYSICAL THERAPY | Age: 59
Discharge: HOME | End: 2019-12-16
Payer: MEDICAID

## 2019-12-16 PROCEDURE — 97110 THERAPEUTIC EXERCISES: CPT

## 2019-12-16 PROCEDURE — 97140 MANUAL THERAPY 1/> REGIONS: CPT

## 2019-12-16 PROCEDURE — 97012 MECHANICAL TRACTION THERAPY: CPT

## 2019-12-16 NOTE — PROGRESS NOTES
PT DAILY TREATMENT NOTE 10-18    Patient Name: Carlyle Torres  Date:2019  : 1960  [x]  Patient  Verified  Payor: [facility] MEDICAID / Plan: [place] [employer] [place] [employer] / Product Type: Managed Care Medicaid /    In time:931  Out time:1025  Total Treatment Time (min): 44  Visit #: 6 of 12    Medicare/BCBS Only   Total Timed Codes (min):   1:1 Treatment Time:         Treatment Area: Neck pain [M54.2]  Pain of right upper extremity [M79.601]  Degeneration of cervical intervertebral disc [M50.30]    SUBJECTIVE  Pain Level (0-10 scale): 0 pain 5/10 numbness  Any medication changes, allergies to medications, adverse drug reactions, diagnosis change, or new procedure performed?: [x] No    [] Yes (see summary sheet for update)  Subjective functional status/changes:   [] No changes reported  Patient denied pain but noted numbness across shoulder blades and in left arm more than right     OBJECTIVE    Modality rationale: decrease pain to improve the patients ability to perform ADLs   Min Type Additional Details    [] Estim:  []Unatt       []IFC  []Premod                        []Other:  []w/ice   []w/heat  Position:  Location:    [] Estim: []Att    []TENS instruct  []NMES                    []Other:  []w/US   []w/ice   []w/heat  Position:  Location:   15 [x]  Traction: [x] Cervical       []Lumbar                       [] Prone          [x]Supine                       []Intermittent   []Continuous Lbs: 15/9  [] before manual  [x] after manual    []  Ultrasound: []Continuous   [] Pulsed                           []1MHz   []3MHz W/cm2:  Location:    []  Iontophoresis with dexamethasone         Location: [] Take home patch   [] In clinic    []  Ice     []  heat  []  Ice massage  []  Laser   []  Anodyne Position:  Location:    []  Laser with stim  []  Other:  Position:  Location:    []  Vasopneumatic Device Pressure:       [] lo [] med [] hi   Temperature: [] lo [] med [] hi   [] Skin assessment post-treatment: []intact []redness- no adverse reaction    []redness - adverse reaction:       19 min Therapeutic Exercise:  [x] See flow sheet :   Rationale: increase ROM and increase strength to improve the patients ability to perform ADLs      10 min Manual Therapy:  DTM to cervical paraspinals and SOR   Rationale: decrease pain, increase ROM, increase tissue extensibility and decrease trigger points to perform ADLs              With   [] TE   [] TA   [] neuro   [] other: Patient Education: [x] Review HEP    [] Progressed/Changed HEP based on:   [] positioning   [] body mechanics   [] transfers   [] heat/ice application    [] other:      Other Objective/Functional Measures:      Pain Level (0-10 scale) post treatment: 0    ASSESSMENT/Changes in Function: patient is reporting improved sxs in right arm with therapy at this time but continues to have limited cervical rotation and SB, bilaterally. Patient will continue to benefit from skilled PT services to modify and progress therapeutic interventions, address functional mobility deficits, address ROM deficits, address strength deficits, analyze and address soft tissue restrictions and analyze and cue movement patterns to attain remaining goals. [x]  See Plan of Care  []  See progress note/recertification  []  See Discharge Summary         Progress towards goals / Updated goals:  1. Pt will be independent with HEP to maintain progression. Goal met. 11/26/19     Long term goals: To be accomplished within 8 weeks  1. Pt will improve FOTO score by 13 points to 45/100 to show improvement with functional mobility performance. Eval status: 32   2. Pt will report at least 50% improvement with radicular symptoms to improve his QOL. Progressing.  12/4/19  Eval status: constant numbness along right shoulder and UE, worst with C6 distribution, relief with distraction and Left lat flex but poor long term carry over.   3. Pt will be able to perform DNF (tuck and lift) for more than 20 seconds to improve strength of c/s for more comfortable ADLs performance  Eval status: 12 sec with SCM compensation and mod fatigued    PLAN  []  Upgrade activities as tolerated     [x]  Continue plan of care  []  Update interventions per flow sheet       []  Discharge due to:_  []  Other:_      Viviane ROBERTO Dao 12/16/2019  9:42 AM    Future Appointments   Date Time Provider Wanda Chavez   12/18/2019  9:30 AM Brandie Perez, PTA [facility]   12/23/2019 10:00 AM Brandie Ana, PTA [facility]   12/27/2019  9:30 AM Brandie Ana, PTA [facility]   12/30/2019  9:30 AM Brandie Ana, PTA [facility]   1/3/2020  9:30 AM Brandie Perez, PTA [facility]   1/6/2020  9:00 AM Raiza Dunham PA-C [facility] SCHED   1/8/2020  8:30 AM Raiza Dunham PA-C [facility]ED   1/9/2020  2:30 PM Lottie Marshall MD [facility]   1/10/2020 12:00 PM [facility] PSAT RM 6 [facility]   1/30/2020  2:10 PM [facility]

## 2019-12-18 ENCOUNTER — HOSPITAL ENCOUNTER (OUTPATIENT)
Dept: PHYSICAL THERAPY | Age: 59
Discharge: HOME | End: 2019-12-18
Payer: MEDICAID

## 2019-12-18 PROCEDURE — 97012 MECHANICAL TRACTION THERAPY: CPT

## 2019-12-18 PROCEDURE — 97110 THERAPEUTIC EXERCISES: CPT

## 2019-12-18 PROCEDURE — 97140 MANUAL THERAPY 1/> REGIONS: CPT

## 2019-12-18 NOTE — PROGRESS NOTES
[facility] - Edgar Schaffer  [address] [place] [address]  [phone] fax    Physical Therapy Progress Note  Patient name: Delfina Ren Start of Care: 2019   Referral source: Yusef Jimenez MD : 1960               Medical Diagnosis: Degeneration of cervical intervertebral disc [M50.30]  Other cervical disc degeneration, unspecified cervical region [M50.30]  Payor: [facility] MEDICAID / Plan: [facility] [employer] [place] [employer] / Product Type: Managed Care Medicaid /  Onset Date: aggravated about several weeks ago               Treatment Diagnosis: Neck pain with radicular symptoms with Right UE   Prior Hospitalization: see medical history Provider#: [id number]   Medications: Verified on Patient summary List    Comorbidities: depression, arthritis, weight change of more than 10 lbs, HTN, visual impaired   Prior Level of Function: right handed, heavy lifting/construction work    Vis[facility]: 7    Missed Visits: 1    Established Goals:         Excellent           Good         Limited           None  [x] Increased ROM   []  [x]  []  []  [x] Increased Strength  []  [x]  []  []  [x] Increased Mobility  []  [x]  []  []   [x] Decreased Pain   []  [x]  []  []  [] Decreased Swelling  []  []  []  []    Key Functional Changes:  Pt. Is progressing slowly towards goals. He is having less radicular symptoms down right UE. He has been having numbness down left UE to elbow that is intermittent. FOTO score decreased to 14 points. Deep cervical flexion endurance is 20 seconds. Skilled PT is medically necessary in order to improve cervical strength and mobility for increased ease of ADLs and improved quality of life. Updated Goals: to be achieved in 4 weeks:  1. Patient will improve FOTO score by 13 points in order to demonstrate a significant improvement in function.    2. Patient will report a 50% improvement in radicular symptoms in order to improve quality of life.   3. Patient will perform deep cervical flexion endurance to 25 seconds in order to increase ease of ADLs. ASSESSMENT/RECOMMENDATIONS:  [x]Continue therapy per initial plan/protocol at a frequency of  2 x per week for 4 weeks  []Continue therapy with the following recommended changes:_____________________      _____________________________________________________________________  []Discontinue therapy progressing towards or have reached established goals  []Discontinue therapy due to lack of appreciable progress towards goals  []Discontinue therapy due to lack of attendance or compliance  []Await Physician's recommendations/decisions regarding therapy  []Other:________________________________________________________________    Thank you for this referral.   Leo Moctezuma, PT 12/18/2019 2:05 PM    NOTE TO PHYSICIAN:  PLEASE COMPLETE THE ORDERS BELOW AND   FAX TO [facility]: [phone]  If you are unable to process this request in 24 hours please contact our office: [phone] I have read the above report and request that my patient continue as recommended. ? I have read the above report and request that my patient continue therapy with the following changes/special instructions:____________________________________  ? I have read the above report and request that my patient be discharged from therapy.     Physicians signature: ______________________________Date: ______Time:______

## 2019-12-18 NOTE — PROGRESS NOTES
PT DAILY TREATMENT NOTE 10-18    Patient Name: Vinod Berkowitz [place]ter  Date:2019  : 1960  [x]  Patient  Verified  Payor: Bassam Harris / Plan: [facility] COMMUNITY PLAN [facility] / Product Type: Managed Care Medicaid /    In time: 9:31  Out time: 10:28  Total Treatment Time (min): 62  Visit #: 7 of 12    Treatment Area: Neck pain [M54.2]  Pain of right upper extremity [M79.601]  Degeneration of cervical intervertebral disc [M50.30]    SUBJECTIVE  Pain Level (0-10 scale):  0/10  Any medication changes, allergies to medications, adverse drug reactions, diagnosis change, or new procedure performed?: [x] No    [] Yes (see summary sheet for update)  Subjective functional status/changes:   [] No changes reported  Pt. Reports he is getting a little better. He reports he continues to have numbness but now more on left side. Pt. Denies difficulty speaking/swallowing, and dizziness. OBJECTIVE    Modality rationale: decrease pain to improve the patients ability to perform ADLs   Min Type Additional Details      []? Estim:  []? Unatt       []? IFC  []? Premod                        []?Other:  []?w/ice   []?w/heat  Position:  Location:      []? Estim: []? Att    []? TENS instruct  []? NMES                    []?Other:  []?w/US   []?w/ice   []?w/heat  Position:  Location:    15 [x]? Traction: [x]? Cervical       []? Lumbar                       []? Prone          [x]? Supine                       []?Intermittent   []? Continuous Lbs: 20/15#  []? before manual  [x]? after manual      []? Ultrasound: []? Continuous   []? Pulsed                           []? 1MHz   []? 3MHz W/cm2:  Location:      []? Iontophoresis with dexamethasone         Location: []? Take home patch   []? In clinic      []? Ice     []?  heat  []? Ice massage  []? Laser   []? Anodyne Position:  Location:      []? Laser with stim  []? Other:  Position:  Location:      []? Vasopneumatic Device Pressure:       []? lo []? med []? hi   Temperature: []? lo []? med []? hi    [x]? Skin assessment post-treatment:  [x]? intact []? redness- no adverse reaction    []? redness - adverse reaction:       32 min Therapeutic Exercise:  [x] See flow sheet :   Rationale: increase ROM and increase strength to improve the patients ability to increase ease of ADLs    10 min Manual Therapy:  Trigger point release and STM to cervical paraspinals    Rationale: decrease pain, increase ROM and increase tissue extensibility to increase ease of ADLs          With   [x] TE   [] TA   [] neuro   [] other: Patient Education: [x] Review HEP    [] Progressed/Changed HEP based on:   [] positioning   [] body mechanics   [] transfers   [] heat/ice application    [] other:      Other Objective/Functional Measures: FOTO: 14  % improvement in symptoms radicular symptoms: numbness in right shoulder and to left arm to elbow. Numbness is intermittent  Deep cervical flexion endurance: 20 seconds  No change in numbness with repeated flexion or extension  Upon sitting up after manual pt.  Has numbness in left UE that worsened with extension and improved with flexion     Pain Level (0-10 scale) post treatment: 0/10    ASSESSMENT/Changes in Function:     See progress note     Progress towards goals / Updated goals:  See progress note    PLAN  []  Upgrade activities as tolerated     [x]  Continue plan of care  []  Update interventions per flow sheet       []  Discharge due to:_  []  Other:_      Ethan Medrano PT 12/18/2019  7:40 AM    Future Appointments   Date Time Provider Wanda Chavez   12/18/2019  9:30 AM Guillermo Little PT [facility] [address]   12/23/2019 10:00 AM Sridhar Concepcion PTA [facility] [address]   12/27/2019  9:30 AM Sridhar Concepcion PTA [facility] [address]   12/30/2019  9:30 AM Guillermo Little PT [facility] [address]   12/30/2019 11:00 AM [address] PAT ROOM P1 [facility] [address]   1/3/2020  9:30 AM Sridhar Concepcion PTA [facility] [address]   1/6/2020  9:00 AM Tasha Rich PA-C [facility]   1/8/2020  8:30 AM [facility]   1/10/2020 12:00  [facility] [address]   1/30/2020  2:10 PM [facility]

## 2019-12-23 ENCOUNTER — HOSPITAL ENCOUNTER (OUTPATIENT)
Dept: PHYSICAL THERAPY | Age: 59
Discharge: HOME | End: 2019-12-23
Payer: MEDICAID

## 2019-12-23 PROCEDURE — 97110 THERAPEUTIC EXERCISES: CPT

## 2019-12-23 PROCEDURE — 97140 MANUAL THERAPY 1/> REGIONS: CPT

## 2019-12-23 PROCEDURE — 97012 MECHANICAL TRACTION THERAPY: CPT

## 2019-12-27 ENCOUNTER — HOSPITAL ENCOUNTER (OUTPATIENT)
Dept: PHYSICAL THERAPY | Age: 59
Discharge: HOME | End: 2019-12-27
Payer: MEDICAID

## 2019-12-27 PROCEDURE — 97012 MECHANICAL TRACTION THERAPY: CPT

## 2019-12-27 PROCEDURE — 97110 THERAPEUTIC EXERCISES: CPT

## 2019-12-27 PROCEDURE — 97140 MANUAL THERAPY 1/> REGIONS: CPT

## 2019-12-27 NOTE — PROGRESS NOTES
PT DAILY TREATMENT NOTE 10-18    Patient Name: Kendal Nicolas  Date:2019  : 1960  [x]  Patient  Verified  Payor: [facility] MEDICAID / Plan: [facility] [employer] / Product Type: Managed Care Medicaid /    In time:1100  Out time:1148  Total Treatment Time (min): 48  Visit #: 2 of 8    Treatment Area: Neck pain [M54.2]  Pain of right upper extremity [M79.601]  Degeneration of cervical intervertebral disc [M50.30]    SUBJECTIVE  Pain Level (0-10 scale): 0/10  Any medication changes, allergies to medications, adverse drug reactions, diagnosis change, or new procedure performed?: [x] No    [] Yes (see summary sheet for update)  Subjective functional status/changes:   [] No changes reported  Pt stated that he cont with having numbness in the left arm down to the elbow    OBJECTIVE    Modality rationale: decrease pain and increase tissue extensibility to improve the patients ability to increase ease with ADLs   Min Type Additional Details    [] Estim:  []Unatt       []IFC  []Premod                        []Other:  []w/ice   []w/heat  Position:  Location:    [] Estim: []Att    []TENS instruct  []NMES                    []Other:  []w/US   []w/ice   []w/heat  Position:  Location:   15 [x]  Traction: [x] Cervical       []Lumbar                       [] Prone          [x]Supine                       [x]Intermittent   []Continuous Lbs:20/15  [] before manual  [x] after manual    []  Ultrasound: []Continuous   [] Pulsed                           []1MHz   []3MHz W/cm2:  Location:    []  Iontophoresis with dexamethasone         Location: [] Take home patch   [] In clinic    []  Ice     []  heat  []  Ice massage  []  Laser   []  Anodyne Position:  Location:    []  Laser with stim  []  Other:  Position:  Location:    []  Vasopneumatic Device Pressure:       [] lo [] med [] hi   Temperature: [] lo [] med [] hi   [x] Skin assessment post-treatment:  [x]intact []redness- no adverse reaction    []redness - adverse reaction:     25 min Therapeutic Exercise:  [x] See flow sheet :   Rationale: increase ROM and increase strength to improve the patients ability to increase ease with ADLs    8 min Manual Therapy:  DTM to cervical paraspinals   Rationale: increase ROM and increase tissue extensibility to increase ease with ADLs    With   [x] TE   [] TA   [] neuro   [] other: Patient Education: [x] Review HEP    [] Progressed/Changed HEP based on:   [] positioning   [] body mechanics   [] transfers   [] heat/ice application    [] other:      Other Objective/Functional Measures:   Had no difficulty with added exercise  No tightness in the sub-occipitals  No difficulty with any of the exercises  Had mild tightness in cervical paraspinals which resolved with manual     Pain Level (0-10 scale) post treatment: 0/10    ASSESSMENT/Changes in Function:   Pt is making slow progress toward goals. Pt no longer has pain in the neck, but cont with numbness which has moved from the right UE to the left. Cont to report difficulty with performing ADLs. Patient will continue to benefit from skilled PT services to modify and progress therapeutic interventions, address functional mobility deficits, address ROM deficits, address strength deficits, analyze and address soft tissue restrictions, analyze and cue movement patterns, analyze and modify body mechanics/ergonomics, assess and modify postural abnormalities and instruct in home and community integration to attain remaining goals. []  See Plan of Care  [x]  See progress note/recertification  []  See Discharge Summary         Progress towards goals / Updated goals:  1. Patient will improve FOTO score by 13 points in order to demonstrate a significant improvement in function. 2. Patient will report a 50% improvement in radicular symptoms in order to improve quality of life. Progressing. 12/27/19  3.  Patient will perform deep cervical flexion endurance to 25 seconds in order to increase ease of ADLs.      PLAN  []  Upgrade activities as tolerated     [x]  Continue plan of care  []  Update interventions per flow sheet       []  Discharge due to:_  []  Other:_      Naif Craig PTA 12/27/2019  10:59 AM    Future Appointments   Date Time Provider Wanda Chavez   12/27/2019 11:00 AM Phillip Carpenter PTA [facility]   12/30/2019  9:30 AM Phillip Carpenter PTA [facility]   12/30/2019 11:00 AM [facility] PAT ROOM P1 [facility]   1/3/2020  9:30 AM Phillip Carpenter PTA [facility]   1/6/2020  9:00 AM Lennox Schilling, PA-C [facility] SCHED   1/8/2020  8:30 AM Lennox Schilling, PA-C [facility] SCHED   1/10/2020 12:00  [facility] PSAT RM 6 [facility]   1/30/2020  2:10 PM Lennox Schilling, PA-C [address]

## 2019-12-30 ENCOUNTER — APPOINTMENT (OUTPATIENT)
Dept: PHYSICAL THERAPY | Age: 59
End: 2019-12-30
Payer: MEDICAID

## 2019-12-30 ENCOUNTER — HOSPITAL ENCOUNTER (OUTPATIENT)
Dept: GENERAL RADIOLOGY | Age: 59
Discharge: HOME | End: 2019-12-30
Payer: MEDICAID

## 2019-12-30 ENCOUNTER — HOSPITAL ENCOUNTER (OUTPATIENT)
Dept: PREADMISSION TESTING | Age: 59
Discharge: HOME | End: 2019-12-30
Payer: MEDICAID

## 2019-12-30 DIAGNOSIS — M16.12 PRIMARY OSTEOARTHRITIS OF LEFT HIP: ICD-10-CM

## 2019-12-30 DIAGNOSIS — Z01.818 PREOP EXAMINATION: ICD-10-CM

## 2019-12-30 LAB
ABO + RH BLD: NORMAL
ALBUMIN SERPL-MCNC: 3.4 G/DL (ref 3.4–5)
ANION GAP SERPL CALC-SCNC: 3 MMOL/L (ref 3–18)
APPEARANCE UR: CLEAR
APTT PPP: 32.5 SEC (ref 23–36.4)
BASOPHILS # BLD: 0 K/UL (ref 0–0.1)
BASOPHILS NFR BLD: 0 % (ref 0–2)
BILIRUB UR QL: NEGATIVE
BLOOD GROUP ANTIBODIES SERPL: NORMAL
BUN SERPL-MCNC: 13 MG/DL (ref 7–18)
BUN/CREAT SERPL: 17 (ref 12–20)
CALCIUM SERPL-MCNC: 8.7 MG/DL (ref 8.5–10.1)
CHLORIDE SERPL-SCNC: 109 MMOL/L (ref 100–111)
CO2 SERPL-SCNC: 30 MMOL/L (ref 21–32)
COLOR UR: YELLOW
CREAT SERPL-MCNC: 0.78 MG/DL (ref 0.6–1.3)
DIFFERENTIAL METHOD BLD: ABNORMAL
EOSINOPHIL # BLD: 0.1 K/UL (ref 0–0.4)
EOSINOPHIL NFR BLD: 3 % (ref 0–5)
ERYTHROCYTE [DISTWIDTH] IN BLOOD BY AUTOMATED COUNT: 15.1 % (ref 11.6–14.5)
EST. AVERAGE GLUCOSE BLD GHB EST-MCNC: 111 MG/DL
GLUCOSE SERPL-MCNC: 87 MG/DL (ref 74–99)
GLUCOSE UR STRIP.AUTO-MCNC: NEGATIVE MG/DL
HBA1C MFR BLD: 5.5 % (ref 4.2–5.6)
HCT VFR BLD AUTO: 35.2 % (ref 36–48)
HGB BLD-MCNC: 11.3 G/DL (ref 13–16)
HGB UR QL STRIP: NEGATIVE
INR PPP: 1 (ref 0.8–1.2)
KETONES UR QL STRIP.AUTO: NEGATIVE MG/DL
LEUKOCYTE ESTERASE UR QL STRIP.AUTO: NEGATIVE
LYMPHOCYTES # BLD: 1.6 K/UL (ref 0.9–3.6)
LYMPHOCYTES NFR BLD: 32 % (ref 21–52)
MCH RBC QN AUTO: 22.3 PG (ref 24–34)
MCHC RBC AUTO-ENTMCNC: 32.1 G/DL (ref 31–37)
MCV RBC AUTO: 69.6 FL (ref 74–97)
MONOCYTES # BLD: 0.5 K/UL (ref 0.05–1.2)
MONOCYTES NFR BLD: 10 % (ref 3–10)
NEUTS SEG # BLD: 2.8 K/UL (ref 1.8–8)
NEUTS SEG NFR BLD: 55 % (ref 40–73)
NITRITE UR QL STRIP.AUTO: NEGATIVE
PH UR STRIP: 5 [PH] (ref 5–8)
PLATELET # BLD AUTO: 292 K/UL (ref 135–420)
PMV BLD AUTO: 9.2 FL (ref 9.2–11.8)
POTASSIUM SERPL-SCNC: 4.1 MMOL/L (ref 3.5–5.5)
PROT UR STRIP-MCNC: NEGATIVE MG/DL
PROTHROMBIN TIME: 13.2 SEC (ref 11.5–15.2)
RBC # BLD AUTO: 5.06 M/UL (ref 4.7–5.5)
SODIUM SERPL-SCNC: 142 MMOL/L (ref 136–145)
SP GR UR REFRACTOMETRY: 1.02 (ref 1–1.03)
SPECIMEN EXP DATE BLD: NORMAL
UROBILINOGEN UR QL STRIP.AUTO: 0.2 EU/DL (ref 0.2–1)
WBC # BLD AUTO: 5.1 K/UL (ref 4.6–13.2)

## 2019-12-30 PROCEDURE — 36415 COLL VENOUS BLD VENIPUNCTURE: CPT

## 2019-12-30 PROCEDURE — 82040 ASSAY OF SERUM ALBUMIN: CPT

## 2019-12-30 PROCEDURE — 85025 COMPLETE CBC W/AUTO DIFF WBC: CPT

## 2019-12-30 PROCEDURE — 71046 X-RAY EXAM CHEST 2 VIEWS: CPT

## 2019-12-30 PROCEDURE — 85610 PROTHROMBIN TIME: CPT

## 2019-12-30 PROCEDURE — 81003 URINALYSIS AUTO W/O SCOPE: CPT

## 2019-12-30 PROCEDURE — 85730 THROMBOPLASTIN TIME PARTIAL: CPT

## 2019-12-30 PROCEDURE — 80048 BASIC METABOLIC PNL TOTAL CA: CPT

## 2019-12-30 PROCEDURE — 86900 BLOOD TYPING SEROLOGIC ABO: CPT

## 2019-12-30 PROCEDURE — 87086 URINE CULTURE/COLONY COUNT: CPT

## 2019-12-30 PROCEDURE — 83036 HEMOGLOBIN GLYCOSYLATED A1C: CPT

## 2019-12-30 RX ORDER — CYCLOBENZAPRINE HCL 10 MG
TABLET ORAL
COMMUNITY

## 2019-12-30 RX ORDER — GABAPENTIN 100 MG/1
CAPSULE ORAL
COMMUNITY

## 2019-12-30 RX ORDER — AMLODIPINE BESYLATE 10 MG/1
10 TABLET ORAL DAILY
COMMUNITY

## 2019-12-30 RX ORDER — ATORVASTATIN CALCIUM 10 MG/1
10 TABLET, FILM COATED ORAL DAILY
COMMUNITY

## 2019-12-30 NOTE — PERIOP NOTES
Jennifer Noel was here today for his PAT appointment. Health assessment was completed and instructions given regarding NPO status, medications, Hibiclens washes, and removal of all jewelry and/or body piercing. Instructed patient not to remove the red [employer] armband that was placed on their arm when the Type and Screen was drawn. Instructed to bring walker to the hospital on the day of surgery so it can be properly adjusted by the physical therapist.  Kavita Torres was given to ask questions and all questions were answered. Understanding of instructions was verbalized.

## 2019-12-30 NOTE — PROGRESS NOTES
[facility] has decided with their surgeon to have a joint replacement to improve mobility and decrease pain. Joint Replacement Pre-Operative class was attended 12/30/2019. Topics discussed included surgery preparation, what to expect the day of surgery, medications (to include a multimodal approach to pain control and limiting narcotics), nutrition, glycemic control, respiratory therapy, physical and occupational therapy, and discharge planning. Discussed the importance of using these alternative pain management methods with the goal of using less opioid use after surgery and at home. A patient education notebook was provided and the opportunity was given to ask questions. The phone number of the Orthopaedic  was provided for any future questions or concerns.

## 2019-12-31 LAB
BACTERIA SPEC CULT: NORMAL
SERVICE CMNT-IMP: NORMAL

## 2020-01-02 ENCOUNTER — TELEPHONE (OUTPATIENT)
Dept: ORTHOPEDIC SURGERY | Age: 60
End: 2020-01-02

## 2020-01-02 NOTE — TELEPHONE ENCOUNTER
Avani Jones from [facility] [address] called stating she has been trying for 2 days to get a \"surgical clearance\" for the patient prior to surgery by [facility] upcoming.       [phone]  Fax [phone]

## 2020-01-06 ENCOUNTER — OFFICE VISIT (OUTPATIENT)
Dept: ORTHOPEDIC SURGERY | Facility: CLINIC | Age: 60
End: 2020-01-06

## 2020-01-06 DIAGNOSIS — Z01.818 ENCOUNTER FOR PREOPERATIVE EXAMINATION FOR GENERAL SURGICAL PROCEDURE: ICD-10-CM

## 2020-01-06 DIAGNOSIS — M16.12 PRIMARY OSTEOARTHRITIS OF LEFT HIP: Primary | ICD-10-CM

## 2020-01-08 ENCOUNTER — APPOINTMENT (OUTPATIENT)
Dept: PHYSICAL THERAPY | Age: 60
End: 2020-01-08

## 2020-01-08 ENCOUNTER — OFFICE VISIT (OUTPATIENT)
Dept: ORTHOPEDIC SURGERY | Facility: CLINIC | Age: 60
End: 2020-01-08

## 2020-01-08 VITALS
WEIGHT: 183 LBS | HEIGHT: 73 IN | HEART RATE: 71 BPM | RESPIRATION RATE: 18 BRPM | OXYGEN SATURATION: 99 % | DIASTOLIC BLOOD PRESSURE: 87 MMHG | SYSTOLIC BLOOD PRESSURE: 138 MMHG | BODY MASS INDEX: 24.25 KG/M2 | TEMPERATURE: 95.7 F

## 2020-01-08 DIAGNOSIS — M16.12 PRIMARY OSTEOARTHRITIS OF LEFT HIP: ICD-10-CM

## 2020-01-08 DIAGNOSIS — Z01.818 ENCOUNTER FOR PREOPERATIVE EXAMINATION FOR GENERAL SURGICAL PROCEDURE: Primary | ICD-10-CM

## 2020-01-08 RX ORDER — WARFARIN 1 MG/1
10 TABLET ORAL ONCE
Status: CANCELLED | OUTPATIENT
Start: 2020-01-08 | End: 2020-01-08

## 2020-01-08 RX ORDER — PREGABALIN 25 MG/1
75 CAPSULE ORAL ONCE
Status: CANCELLED | OUTPATIENT
Start: 2020-01-08 | End: 2020-01-08

## 2020-01-08 RX ORDER — ACETAMINOPHEN 325 MG/1
1000 TABLET ORAL ONCE
Status: CANCELLED | OUTPATIENT
Start: 2020-01-08 | End: 2020-01-08

## 2020-01-08 RX ORDER — CELECOXIB 100 MG/1
400 CAPSULE ORAL ONCE
Status: CANCELLED | OUTPATIENT
Start: 2020-01-08 | End: 2020-01-08

## 2020-01-08 NOTE — H&P
[address] [place] [address]  [phone]           HISTORY & PHYSICAL      Patient: Dalila Hernandez                MRN: [id number]       SSN: [id number]  YOB: 1960        AGE: 63 y.o. SEX: male  Body mass index is 24.14 kg/m². PCP: Ilda Rendon MD  01/08/20      CC: left hip end stage OA  Problem List Items Addressed This Visit     None      Visit Diagnoses     Primary osteoarthritis of left hip    -  Primary    Encounter for preoperative examination for general surgical procedure                HPI:  The patient is a pleasant 63 y.o. whom has end stage OA of their Left hip and has failed conservative treatment including but not limited to NSAIDS, cortisone injections, viscosupplementation, PT, and pain medicine. Due to the current findings and affected activities of daily living, surgical intervention is indicated. The alternatives, risks, complications, as well as expected outcome were discussed. These include but are not limited to infection, blood loss, need for blood transfusion, neurovascular damage, DVT, PE,  post-op stiffness and pain, leg length discrepancy, dislocation, anesthetic complications, prothesis longevity, need for more surgery, MI, stroke, and even death. The patient understands and wishes to proceed with surgery. Past Medical History:   Diagnosis Date    Anemia     Arthritis     Bilateral sciatica     Chronic pain     Herniated intervertebral disc of lumbar spine     High cholesterol     Hypertension     Muscle spasm of back          Current Outpatient Medications:     atorvastatin (LIPITOR) 10 mg tablet, Take 10 mg by mouth daily. , Disp: , Rfl:     amLODIPine (NORVASC) 10 mg tablet, Take 10 mg by mouth daily. , Disp: , Rfl:     gabapentin (NEURONTIN) 100 mg capsule, Take  by mouth nightly., Disp: , Rfl:     cyclobenzaprine (FLEXERIL) 10 mg tablet, Take  by mouth three (3) times daily as needed for Muscle Spasm(s). , Disp: , Rfl:     meloxicam (MOBIC) 15 mg tablet, Take 1 Tab by mouth daily. , Disp: 30 Tab, Rfl: 0    No Known Allergies    Social History     Socioeconomic History    Marital status: SINGLE     Spouse name: Not on file    Number of children: Not on file    Years of education: Not on file    Highest education level: Not on file   Occupational History    Not on file   Social Needs    Financial resource strain: Not on file    Food insecurity:     Worry: Not on file     Inability: Not on file    Transportation needs:     Medical: Not on file     Non-medical: Not on file   Tobacco Use    Smoking status: Former Smoker     Types: Cigarettes     Last attempt to quit: 2016     Years since quittin.0    Smokeless tobacco: Never Used   Substance and Sexual Activity    Alcohol use: No    Drug use: Not on file    Sexual activity: Not on file   Lifestyle    Physical activity:     Days per week: Not on file     Minutes per session: Not on file    Stress: Not on file   Relationships    Social connections:     Talks on phone: Not on file     Gets together: Not on file     Attends Ca[employer]ism service: Not on file     Active member of club or organization: Not on file     Attends meetings of clubs or organizations: Not on file     Relationship status: Not on file    Intimate partner violence:     Fear of current or ex partner: Not on file     Emotionally abused: Not on file     Physically abused: Not on file     Forced sexual activity: Not on file   Other Topics Concern    Not on file   Social History Narrative    Not on file       Past Surgical History:   Procedure Laterality Date    HX ORTHOPAEDIC Left 1999    hand laceration & ligament repair       Family History:  Non-contributory.      PE:  Visit Vitals  /87   Pulse 71   Temp 95.7 °F (35.4 °C) (Oral)   Resp 18   Ht 6' 1\" (1.854 m)   Wt 183 lb (83 kg)   SpO2 99%   BMI 24.14 kg/m²     A&O X3, NAD, well develop, well nourished  Heart: S1-S2, rrr  Lungs: CTA bilat  Abd: soft, nt, nt, + bs in all quadrants  Ext:  Pos distal pulses to DP, PT  Leg lengths even grossly sitting in the chair    X-ray: left hip shows end stage OA    Labs: labs were reviewed and wnl.  ua neg    A:  Left  hip end stage OA    P:  At this point we will move forward with surgery. Again, the alternatives, risks, complications, as well as expected outcome were discussed and the patient wishes to proceed with surgery. Pt has been instructed to stop aspirin, nsaids, rheumatologic medications and blood thinners. They have also been instructed to continue on any heart and bp meds and to take them the morning of surgery with sips of water. Direct anterior approach. The patient will require in-patient admission. Admission as an in-patient is reasonable and necessary due to increased risk of surgery due to the factors indicated as well as the possible need for prolonged in-hospital or skilled post-acute care in order to improve this patient's functional ability.         Tessa Menon

## 2020-01-08 NOTE — H&P (VIEW-ONLY)
[address] [place] [address]  [phone]           HISTORY & PHYSICAL      Patient: Robbie Sherman                MRN: [id number]       SSN: [id number]  YOB: 1960        AGE: 63 y.o. SEX: male  Body mass index is 24.14 kg/m². PCP: Claudia Torres MD  01/08/20      CC: left hip end stage OA  Problem List Items Addressed This Visit     None      Visit Diagnoses     Primary osteoarthritis of left hip    -  Primary    Encounter for preoperative examination for general surgical procedure                HPI:  The patient is a pleasant 63 y.o. whom has end stage OA of their Left hip and has failed conservative treatment including but not limited to NSAIDS, cortisone injections, viscosupplementation, PT, and pain medicine. Due to the current findings and affected activities of daily living, surgical intervention is indicated. The alternatives, risks, complications, as well as expected outcome were discussed. These include but are not limited to infection, blood loss, need for blood transfusion, neurovascular damage, DVT, PE,  post-op stiffness and pain, leg length discrepancy, dislocation, anesthetic complications, prothesis longevity, need for more surgery, MI, stroke, and even death. The patient understands and wishes to proceed with surgery. Past Medical History:   Diagnosis Date    Anemia     Arthritis     Bilateral sciatica     Chronic pain     Herniated intervertebral disc of lumbar spine     High cholesterol     Hypertension     Muscle spasm of back          Current Outpatient Medications:     atorvastatin (LIPITOR) 10 mg tablet, Take 10 mg by mouth daily. , Disp: , Rfl:     amLODIPine (NORVASC) 10 mg tablet, Take 10 mg by mouth daily. , Disp: , Rfl:     gabapentin (NEURONTIN) 100 mg capsule, Take  by mouth nightly., Disp: , Rfl:     cyclobenzaprine (FLEXERIL) 10 mg tablet, Take  by mouth three (3) times daily as needed for Muscle Spasm(s). , Disp: , Rfl:     meloxicam (MOBIC) 15 mg tablet, Take 1 Tab by mouth daily. , Disp: 30 Tab, Rfl: 0    No Known Allergies    Social History     Socioeconomic History    Marital status: SINGLE     Spouse name: Not on file    Number of children: Not on file    Years of education: Not on file    Highest education level: Not on file   Occupational History    Not on file   Social Needs    Financial resource strain: Not on file    Food insecurity:     Worry: Not on file     Inability: Not on file    Transportation needs:     Medical: Not on file     Non-medical: Not on file   Tobacco Use    Smoking status: Former Smoker     Types: Cigarettes     Last attempt to quit: 2016     Years since quittin.0    Smokeless tobacco: Never Used   Substance and Sexual Activity    Alcohol use: No    Drug use: Not on file    Sexual activity: Not on file   Lifestyle    Physical activity:     Days per week: Not on file     Minutes per session: Not on file    Stress: Not on file   Relationships    Social connections:     Talks on phone: Not on file     Gets together: Not on file     Attends Adventism service: Not on file     Active member of club or organization: Not on file     Attends meetings of clubs or organizations: Not on file     Relationship status: Not on file    Intimate partner violence:     Fear of current or ex partner: Not on file     Emotionally abused: Not on file     Physically abused: Not on file     Forced sexual activity: Not on file   Other Topics Concern    Not on file   Social History Narrative    Not on file       Past Surgical History:   Procedure Laterality Date    HX ORTHOPAEDIC Left 1999    hand laceration & ligament repair       Family History:  Non-contributory.      PE:  Visit Vitals  /87   Pulse 71   Temp 95.7 °F (35.4 °C) (Oral)   Resp 18   Ht 6' 1\" (1.854 m)   Wt 183 lb (83 kg)   SpO2 99%   BMI 24.14 kg/m²     A&O X3, NAD, well develop, well nourished  Heart: S1-S2, rrr  Lungs: CTA bilat  Abd: soft, nt, nt, + bs in all quadrants  Ext:  Pos distal pulses to DP, PT  Leg lengths even grossly sitting in the chair    X-ray: left hip shows end stage OA    Labs: labs were reviewed and wnl.  ua neg    A:  Left  hip end stage OA    P:  At this point we will move forward with surgery. Again, the alternatives, risks, complications, as well as expected outcome were discussed and the patient wishes to proceed with surgery. Pt has been instructed to stop aspirin, nsaids, rheumatologic medications and blood thinners. They have also been instructed to continue on any heart and bp meds and to take them the morning of surgery with sips of water. Direct anterior approach. The patient will require in-patient admission. Admission as an in-patient is reasonable and necessary due to increased risk of surgery due to the factors indicated as well as the possible need for prolonged in-hospital or skilled post-acute care in order to improve this patient's functional ability.         Althea Uribe

## 2020-01-12 ENCOUNTER — ANESTHESIA EVENT (OUTPATIENT)
Dept: SURGERY | Age: 60
End: 2020-01-12
Payer: MEDICAID

## 2020-01-13 ENCOUNTER — ANESTHESIA (OUTPATIENT)
Dept: SURGERY | Age: 60
End: 2020-01-13
Payer: MEDICAID

## 2020-01-13 ENCOUNTER — APPOINTMENT (OUTPATIENT)
Dept: GENERAL RADIOLOGY | Age: 60
End: 2020-01-13
Attending: ORTHOPAEDIC SURGERY
Payer: MEDICAID

## 2020-01-13 ENCOUNTER — HOSPITAL ENCOUNTER (OUTPATIENT)
Age: 60
LOS: 1 days | Discharge: HOME HEALTH | End: 2020-01-14
Attending: ORTHOPAEDIC SURGERY | Admitting: ORTHOPAEDIC SURGERY
Payer: MEDICAID

## 2020-01-13 ENCOUNTER — HOME HEALTH ADMISSION (OUTPATIENT)
Dept: HOME HEALTH SERVICES | Facility: HOME HEALTH | Age: 60
End: 2020-01-13
Payer: MEDICAID

## 2020-01-13 DIAGNOSIS — M16.10 HIP ARTHRITIS: Primary | ICD-10-CM

## 2020-01-13 LAB
ABO + RH BLD: NORMAL
AMPHET UR QL SCN: NEGATIVE
BARBITURATES UR QL SCN: NEGATIVE
BENZODIAZ UR QL: NEGATIVE
BLOOD GROUP ANTIBODIES SERPL: NORMAL
CANNABINOIDS UR QL SCN: POSITIVE
COCAINE UR QL SCN: NEGATIVE
HDSCOM,HDSCOM: ABNORMAL
IRON SATN MFR SERPL: 19 %
IRON SERPL-MCNC: 48 UG/DL (ref 50–175)
METHADONE UR QL: NEGATIVE
OPIATES UR QL: NEGATIVE
PCP UR QL: NEGATIVE
SPECIMEN EXP DATE BLD: NORMAL
TIBC SERPL-MCNC: 252 UG/DL (ref 250–450)

## 2020-01-13 PROCEDURE — 76060000036 HC ANESTHESIA 2.5 TO 3 HR: Performed by: ORTHOPAEDIC SURGERY

## 2020-01-13 PROCEDURE — 76010000132 HC OR TIME 2.5 TO 3 HR: Performed by: ORTHOPAEDIC SURGERY

## 2020-01-13 PROCEDURE — 77030027138 HC INCENT SPIROMETER -A: Performed by: ORTHOPAEDIC SURGERY

## 2020-01-13 PROCEDURE — 88304 TISSUE EXAM BY PATHOLOGIST: CPT

## 2020-01-13 PROCEDURE — 74011000250 HC RX REV CODE- 250: Performed by: NURSE ANESTHETIST, CERTIFIED REGISTERED

## 2020-01-13 PROCEDURE — 74011000258 HC RX REV CODE- 258: Performed by: ORTHOPAEDIC SURGERY

## 2020-01-13 PROCEDURE — 77030013708 HC HNDPC SUC IRR PULS STRY –B: Performed by: ORTHOPAEDIC SURGERY

## 2020-01-13 PROCEDURE — 77030019908 HC STETH ESOPH SIMS -A: Performed by: ANESTHESIOLOGY

## 2020-01-13 PROCEDURE — 77030040922 HC BLNKT HYPOTHRM STRY -A: Performed by: ORTHOPAEDIC SURGERY

## 2020-01-13 PROCEDURE — C1776 JOINT DEVICE (IMPLANTABLE): HCPCS | Performed by: ORTHOPAEDIC SURGERY

## 2020-01-13 PROCEDURE — 77030031139 HC SUT VCRL2 J&J -A: Performed by: ORTHOPAEDIC SURGERY

## 2020-01-13 PROCEDURE — 97535 SELF CARE MNGMENT TRAINING: CPT

## 2020-01-13 PROCEDURE — 36415 COLL VENOUS BLD VENIPUNCTURE: CPT

## 2020-01-13 PROCEDURE — 77030006835 HC BLD SAW SAG STRY -B: Performed by: ORTHOPAEDIC SURGERY

## 2020-01-13 PROCEDURE — 74011000258 HC RX REV CODE- 258: Performed by: PHYSICIAN ASSISTANT

## 2020-01-13 PROCEDURE — 97165 OT EVAL LOW COMPLEX 30 MIN: CPT

## 2020-01-13 PROCEDURE — 74011250637 HC RX REV CODE- 250/637: Performed by: NURSE ANESTHETIST, CERTIFIED REGISTERED

## 2020-01-13 PROCEDURE — 77030011264 HC ELECTRD BLD EXT COVD -A: Performed by: ORTHOPAEDIC SURGERY

## 2020-01-13 PROCEDURE — 77030019557 HC ELECTRD VES SEAL MEDT -F: Performed by: ORTHOPAEDIC SURGERY

## 2020-01-13 PROCEDURE — 74011000258 HC RX REV CODE- 258: Performed by: NURSE ANESTHETIST, CERTIFIED REGISTERED

## 2020-01-13 PROCEDURE — 97116 GAIT TRAINING THERAPY: CPT

## 2020-01-13 PROCEDURE — 83540 ASSAY OF IRON: CPT

## 2020-01-13 PROCEDURE — 76210000006 HC OR PH I REC 0.5 TO 1 HR: Performed by: ORTHOPAEDIC SURGERY

## 2020-01-13 PROCEDURE — 77010033678 HC OXYGEN DAILY

## 2020-01-13 PROCEDURE — 77030012890: Performed by: ORTHOPAEDIC SURGERY

## 2020-01-13 PROCEDURE — 74011000250 HC RX REV CODE- 250: Performed by: ORTHOPAEDIC SURGERY

## 2020-01-13 PROCEDURE — 74011250636 HC RX REV CODE- 250/636

## 2020-01-13 PROCEDURE — 77030026438 HC STYL ET INTUB CARD -A: Performed by: ANESTHESIOLOGY

## 2020-01-13 PROCEDURE — 77030020061 HC IV BLD WRMR ADMIN SET 3M -B: Performed by: ANESTHESIOLOGY

## 2020-01-13 PROCEDURE — 77030002933 HC SUT MCRYL J&J -A: Performed by: ORTHOPAEDIC SURGERY

## 2020-01-13 PROCEDURE — 74011250637 HC RX REV CODE- 250/637: Performed by: FAMILY MEDICINE

## 2020-01-13 PROCEDURE — 77030003028 HC SUT VCRL J&J -A: Performed by: ORTHOPAEDIC SURGERY

## 2020-01-13 PROCEDURE — 77030003029 HC SUT VCRL J&J -B: Performed by: ORTHOPAEDIC SURGERY

## 2020-01-13 PROCEDURE — 77030020263 HC SOL INJ SOD CL0.9% LFCR 1000ML: Performed by: ORTHOPAEDIC SURGERY

## 2020-01-13 PROCEDURE — 86900 BLOOD TYPING SEROLOGIC ABO: CPT

## 2020-01-13 PROCEDURE — 73502 X-RAY EXAM HIP UNI 2-3 VIEWS: CPT

## 2020-01-13 PROCEDURE — 74011250636 HC RX REV CODE- 250/636: Performed by: PHYSICIAN ASSISTANT

## 2020-01-13 PROCEDURE — 74011250636 HC RX REV CODE- 250/636: Performed by: ORTHOPAEDIC SURGERY

## 2020-01-13 PROCEDURE — 97161 PT EVAL LOW COMPLEX 20 MIN: CPT

## 2020-01-13 PROCEDURE — 77030018836 HC SOL IRR NACL ICUM -A: Performed by: ORTHOPAEDIC SURGERY

## 2020-01-13 PROCEDURE — 80307 DRUG TEST PRSMV CHEM ANLYZR: CPT

## 2020-01-13 PROCEDURE — 74011250637 HC RX REV CODE- 250/637: Performed by: ORTHOPAEDIC SURGERY

## 2020-01-13 PROCEDURE — 74011250637 HC RX REV CODE- 250/637: Performed by: PHYSICIAN ASSISTANT

## 2020-01-13 PROCEDURE — 74011250636 HC RX REV CODE- 250/636: Performed by: NURSE ANESTHETIST, CERTIFIED REGISTERED

## 2020-01-13 PROCEDURE — 77030020294 HC ABD PLLW HIP DERY -B: Performed by: ORTHOPAEDIC SURGERY

## 2020-01-13 PROCEDURE — 77030013079 HC BLNKT BAIR HGGR 3M -A: Performed by: ANESTHESIOLOGY

## 2020-01-13 PROCEDURE — 74011000250 HC RX REV CODE- 250: Performed by: PHYSICIAN ASSISTANT

## 2020-01-13 PROCEDURE — 77030012935 HC DRSG AQUACEL BMS -B: Performed by: ORTHOPAEDIC SURGERY

## 2020-01-13 PROCEDURE — C9290 INJ, BUPIVACAINE LIPOSOME: HCPCS | Performed by: ORTHOPAEDIC SURGERY

## 2020-01-13 PROCEDURE — 77030008684 HC TU ET CUF COVD -B: Performed by: ANESTHESIOLOGY

## 2020-01-13 DEVICE — STEM FEM SZ 7 127D 37X114MM -- ACCOLADE II V40: Type: IMPLANTABLE DEVICE | Site: HIP | Status: FUNCTIONAL

## 2020-01-13 DEVICE — SHELL ACET CLUS H 58F TRTANIUM -- TRIDENT II: Type: IMPLANTABLE DEVICE | Site: HIP | Status: FUNCTIONAL

## 2020-01-13 DEVICE — COMPONENT HIP PRSS FT MTL ON CERM POLYETH X3: Type: IMPLANTABLE DEVICE | Site: HIP | Status: FUNCTIONAL

## 2020-01-13 DEVICE — SCR HEX 6.5X25MM -- TRIDENT II: Type: IMPLANTABLE DEVICE | Site: HIP | Status: FUNCTIONAL

## 2020-01-13 DEVICE — HEAD FEM DELT V40 +2.5MM 36MM -- V40 BIOLOX: Type: IMPLANTABLE DEVICE | Site: HIP | Status: FUNCTIONAL

## 2020-01-13 DEVICE — LINER ACET SZ F ID36MM THK7.9MM 0DEG HIP X3 LOK RNG FOR: Type: IMPLANTABLE DEVICE | Site: HIP | Status: FUNCTIONAL

## 2020-01-13 RX ORDER — WARFARIN 10 MG/1
10 TABLET ORAL ONCE
Status: COMPLETED | OUTPATIENT
Start: 2020-01-13 | End: 2020-01-13

## 2020-01-13 RX ORDER — CYCLOBENZAPRINE HCL 10 MG
5-10 TABLET ORAL
Status: DISCONTINUED | OUTPATIENT
Start: 2020-01-13 | End: 2020-01-14 | Stop reason: HOSPADM

## 2020-01-13 RX ORDER — ONDANSETRON 2 MG/ML
INJECTION INTRAMUSCULAR; INTRAVENOUS AS NEEDED
Status: DISCONTINUED | OUTPATIENT
Start: 2020-01-13 | End: 2020-01-13 | Stop reason: HOSPADM

## 2020-01-13 RX ORDER — ONDANSETRON 2 MG/ML
4 INJECTION INTRAMUSCULAR; INTRAVENOUS ONCE
Status: COMPLETED | OUTPATIENT
Start: 2020-01-13 | End: 2020-01-13

## 2020-01-13 RX ORDER — LABETALOL HCL 20 MG/4 ML
5 SYRINGE (ML) INTRAVENOUS
Status: DISCONTINUED | OUTPATIENT
Start: 2020-01-13 | End: 2020-01-14 | Stop reason: HOSPADM

## 2020-01-13 RX ORDER — PREGABALIN 75 MG/1
75 CAPSULE ORAL ONCE
Status: COMPLETED | OUTPATIENT
Start: 2020-01-13 | End: 2020-01-13

## 2020-01-13 RX ORDER — CEFAZOLIN SODIUM 2 G/50ML
2 SOLUTION INTRAVENOUS
Status: COMPLETED | OUTPATIENT
Start: 2020-01-13 | End: 2020-01-13

## 2020-01-13 RX ORDER — DOCUSATE SODIUM 100 MG/1
100 CAPSULE, LIQUID FILLED ORAL 2 TIMES DAILY
Qty: 60 CAP | Refills: 2 | Status: SHIPPED | OUTPATIENT
Start: 2020-01-13 | End: 2020-04-12

## 2020-01-13 RX ORDER — SODIUM CHLORIDE 0.9 % (FLUSH) 0.9 %
5-40 SYRINGE (ML) INJECTION EVERY 8 HOURS
Status: DISCONTINUED | OUTPATIENT
Start: 2020-01-13 | End: 2020-01-14 | Stop reason: HOSPADM

## 2020-01-13 RX ORDER — FENTANYL CITRATE 50 UG/ML
50 INJECTION, SOLUTION INTRAMUSCULAR; INTRAVENOUS AS NEEDED
Status: DISCONTINUED | OUTPATIENT
Start: 2020-01-13 | End: 2020-01-13 | Stop reason: HOSPADM

## 2020-01-13 RX ORDER — ONDANSETRON 2 MG/ML
4 INJECTION INTRAMUSCULAR; INTRAVENOUS
Status: DISCONTINUED | OUTPATIENT
Start: 2020-01-13 | End: 2020-01-14 | Stop reason: HOSPADM

## 2020-01-13 RX ORDER — FLUMAZENIL 0.1 MG/ML
0.2 INJECTION INTRAVENOUS AS NEEDED
Status: DISCONTINUED | OUTPATIENT
Start: 2020-01-13 | End: 2020-01-14 | Stop reason: HOSPADM

## 2020-01-13 RX ORDER — CEFAZOLIN SODIUM 2 G/50ML
2 SOLUTION INTRAVENOUS EVERY 8 HOURS
Status: COMPLETED | OUTPATIENT
Start: 2020-01-13 | End: 2020-01-14

## 2020-01-13 RX ORDER — GLYCOPYRROLATE 0.2 MG/ML
INJECTION INTRAMUSCULAR; INTRAVENOUS AS NEEDED
Status: DISCONTINUED | OUTPATIENT
Start: 2020-01-13 | End: 2020-01-13 | Stop reason: HOSPADM

## 2020-01-13 RX ORDER — NALOXONE HYDROCHLORIDE 0.4 MG/ML
0.4 INJECTION, SOLUTION INTRAMUSCULAR; INTRAVENOUS; SUBCUTANEOUS AS NEEDED
Status: DISCONTINUED | OUTPATIENT
Start: 2020-01-13 | End: 2020-01-14 | Stop reason: HOSPADM

## 2020-01-13 RX ORDER — NALOXONE HYDROCHLORIDE 0.4 MG/ML
0.1 INJECTION, SOLUTION INTRAMUSCULAR; INTRAVENOUS; SUBCUTANEOUS AS NEEDED
Status: DISCONTINUED | OUTPATIENT
Start: 2020-01-13 | End: 2020-01-13 | Stop reason: HOSPADM

## 2020-01-13 RX ORDER — POLYMYXIN B 500000 [USP'U]/1
INJECTION, POWDER, LYOPHILIZED, FOR SOLUTION INTRAMUSCULAR; INTRATHECAL; INTRAVENOUS; OPHTHALMIC AS NEEDED
Status: DISCONTINUED | OUTPATIENT
Start: 2020-01-13 | End: 2020-01-13 | Stop reason: HOSPADM

## 2020-01-13 RX ORDER — ASPIRIN 325 MG
325 TABLET ORAL 2 TIMES DAILY
Qty: 60 TAB | Refills: 0 | Status: SHIPPED | OUTPATIENT
Start: 2020-01-13

## 2020-01-13 RX ORDER — SODIUM CHLORIDE, SODIUM LACTATE, POTASSIUM CHLORIDE, CALCIUM CHLORIDE 600; 310; 30; 20 MG/100ML; MG/100ML; MG/100ML; MG/100ML
INJECTION, SOLUTION INTRAVENOUS
Status: DISCONTINUED | OUTPATIENT
Start: 2020-01-13 | End: 2020-01-13 | Stop reason: HOSPADM

## 2020-01-13 RX ORDER — OXYCODONE HYDROCHLORIDE 5 MG/1
10-15 TABLET ORAL
Status: DISCONTINUED | OUTPATIENT
Start: 2020-01-13 | End: 2020-01-14 | Stop reason: HOSPADM

## 2020-01-13 RX ORDER — HYDROMORPHONE HYDROCHLORIDE 2 MG/ML
INJECTION, SOLUTION INTRAMUSCULAR; INTRAVENOUS; SUBCUTANEOUS AS NEEDED
Status: DISCONTINUED | OUTPATIENT
Start: 2020-01-13 | End: 2020-01-13 | Stop reason: HOSPADM

## 2020-01-13 RX ORDER — LIDOCAINE HYDROCHLORIDE 20 MG/ML
INJECTION, SOLUTION EPIDURAL; INFILTRATION; INTRACAUDAL; PERINEURAL AS NEEDED
Status: DISCONTINUED | OUTPATIENT
Start: 2020-01-13 | End: 2020-01-13 | Stop reason: HOSPADM

## 2020-01-13 RX ORDER — ACETAMINOPHEN 500 MG
1000 TABLET ORAL EVERY 6 HOURS
Status: DISCONTINUED | OUTPATIENT
Start: 2020-01-13 | End: 2020-01-14 | Stop reason: HOSPADM

## 2020-01-13 RX ORDER — CELECOXIB 200 MG/1
200 CAPSULE ORAL 2 TIMES DAILY
Qty: 60 CAP | Refills: 2 | Status: SHIPPED | OUTPATIENT
Start: 2020-01-13 | End: 2020-04-12

## 2020-01-13 RX ORDER — LANOLIN ALCOHOL/MO/W.PET/CERES
1 CREAM (GRAM) TOPICAL 2 TIMES DAILY WITH MEALS
Status: DISCONTINUED | OUTPATIENT
Start: 2020-01-13 | End: 2020-01-14 | Stop reason: HOSPADM

## 2020-01-13 RX ORDER — FAMOTIDINE 20 MG/1
20 TABLET, FILM COATED ORAL 2 TIMES DAILY
Status: DISCONTINUED | OUTPATIENT
Start: 2020-01-13 | End: 2020-01-14 | Stop reason: HOSPADM

## 2020-01-13 RX ORDER — SODIUM CHLORIDE 0.9 % (FLUSH) 0.9 %
5-40 SYRINGE (ML) INJECTION EVERY 8 HOURS
Status: DISCONTINUED | OUTPATIENT
Start: 2020-01-13 | End: 2020-01-13 | Stop reason: HOSPADM

## 2020-01-13 RX ORDER — ACETAMINOPHEN 500 MG
1000 TABLET ORAL ONCE
Status: COMPLETED | OUTPATIENT
Start: 2020-01-13 | End: 2020-01-13

## 2020-01-13 RX ORDER — ROCURONIUM BROMIDE 10 MG/ML
INJECTION, SOLUTION INTRAVENOUS AS NEEDED
Status: DISCONTINUED | OUTPATIENT
Start: 2020-01-13 | End: 2020-01-13 | Stop reason: HOSPADM

## 2020-01-13 RX ORDER — SODIUM CHLORIDE 9 MG/ML
100 INJECTION, SOLUTION INTRAVENOUS CONTINUOUS
Status: DISPENSED | OUTPATIENT
Start: 2020-01-13 | End: 2020-01-14

## 2020-01-13 RX ORDER — POVIDONE-IODINE 10 %
SOLUTION, NON-ORAL TOPICAL AS NEEDED
Status: DISCONTINUED | OUTPATIENT
Start: 2020-01-13 | End: 2020-01-13 | Stop reason: HOSPADM

## 2020-01-13 RX ORDER — AMOXICILLIN 250 MG
1 CAPSULE ORAL 2 TIMES DAILY
Status: DISCONTINUED | OUTPATIENT
Start: 2020-01-13 | End: 2020-01-14 | Stop reason: HOSPADM

## 2020-01-13 RX ORDER — DIPHENHYDRAMINE HYDROCHLORIDE 50 MG/ML
12.5 INJECTION, SOLUTION INTRAMUSCULAR; INTRAVENOUS
Status: DISCONTINUED | OUTPATIENT
Start: 2020-01-13 | End: 2020-01-13 | Stop reason: HOSPADM

## 2020-01-13 RX ORDER — MAGNESIUM SULFATE HEPTAHYDRATE 500 MG/ML
INJECTION, SOLUTION INTRAMUSCULAR; INTRAVENOUS AS NEEDED
Status: DISCONTINUED | OUTPATIENT
Start: 2020-01-13 | End: 2020-01-13 | Stop reason: HOSPADM

## 2020-01-13 RX ORDER — AMLODIPINE BESYLATE 10 MG/1
10 TABLET ORAL DAILY
Status: DISCONTINUED | OUTPATIENT
Start: 2020-01-13 | End: 2020-01-14 | Stop reason: HOSPADM

## 2020-01-13 RX ORDER — NEOSTIGMINE METHYLSULFATE 1 MG/ML
INJECTION INTRAVENOUS AS NEEDED
Status: DISCONTINUED | OUTPATIENT
Start: 2020-01-13 | End: 2020-01-13 | Stop reason: HOSPADM

## 2020-01-13 RX ORDER — SODIUM CHLORIDE, SODIUM LACTATE, POTASSIUM CHLORIDE, CALCIUM CHLORIDE 600; 310; 30; 20 MG/100ML; MG/100ML; MG/100ML; MG/100ML
50 INJECTION, SOLUTION INTRAVENOUS CONTINUOUS
Status: DISCONTINUED | OUTPATIENT
Start: 2020-01-13 | End: 2020-01-13 | Stop reason: HOSPADM

## 2020-01-13 RX ORDER — OXYCODONE AND ACETAMINOPHEN 7.5; 325 MG/1; MG/1
1-2 TABLET ORAL
Qty: 60 TAB | Refills: 0 | Status: SHIPPED | OUTPATIENT
Start: 2020-01-13 | End: 2020-01-20

## 2020-01-13 RX ORDER — VANCOMYCIN HYDROCHLORIDE 1 G/20ML
INJECTION, POWDER, LYOPHILIZED, FOR SOLUTION INTRAVENOUS AS NEEDED
Status: DISCONTINUED | OUTPATIENT
Start: 2020-01-13 | End: 2020-01-13 | Stop reason: HOSPADM

## 2020-01-13 RX ORDER — CELECOXIB 400 MG/1
400 CAPSULE ORAL ONCE
Status: COMPLETED | OUTPATIENT
Start: 2020-01-13 | End: 2020-01-13

## 2020-01-13 RX ORDER — FAMOTIDINE 20 MG/1
20 TABLET, FILM COATED ORAL ONCE
Status: COMPLETED | OUTPATIENT
Start: 2020-01-13 | End: 2020-01-13

## 2020-01-13 RX ORDER — KETAMINE HCL 50MG/ML(1)
SYRINGE (ML) INTRAVENOUS AS NEEDED
Status: DISCONTINUED | OUTPATIENT
Start: 2020-01-13 | End: 2020-01-13 | Stop reason: HOSPADM

## 2020-01-13 RX ORDER — ASPIRIN 325 MG
325 TABLET, DELAYED RELEASE (ENTERIC COATED) ORAL 2 TIMES DAILY
Status: DISCONTINUED | OUTPATIENT
Start: 2020-01-14 | End: 2020-01-14 | Stop reason: HOSPADM

## 2020-01-13 RX ORDER — SODIUM CHLORIDE 0.9 % (FLUSH) 0.9 %
5-40 SYRINGE (ML) INJECTION AS NEEDED
Status: DISCONTINUED | OUTPATIENT
Start: 2020-01-13 | End: 2020-01-14 | Stop reason: HOSPADM

## 2020-01-13 RX ORDER — CEPHALEXIN 500 MG/1
500 CAPSULE ORAL 4 TIMES DAILY
Qty: 8 CAP | Refills: 0 | Status: SHIPPED | OUTPATIENT
Start: 2020-01-13

## 2020-01-13 RX ORDER — HYDROMORPHONE HYDROCHLORIDE 2 MG/ML
0.5 INJECTION, SOLUTION INTRAMUSCULAR; INTRAVENOUS; SUBCUTANEOUS
Status: DISCONTINUED | OUTPATIENT
Start: 2020-01-13 | End: 2020-01-13 | Stop reason: HOSPADM

## 2020-01-13 RX ORDER — ZOLPIDEM TARTRATE 5 MG/1
5 TABLET ORAL
Status: DISCONTINUED | OUTPATIENT
Start: 2020-01-13 | End: 2020-01-14 | Stop reason: HOSPADM

## 2020-01-13 RX ORDER — DEXAMETHASONE SODIUM PHOSPHATE 4 MG/ML
INJECTION, SOLUTION INTRA-ARTICULAR; INTRALESIONAL; INTRAMUSCULAR; INTRAVENOUS; SOFT TISSUE AS NEEDED
Status: DISCONTINUED | OUTPATIENT
Start: 2020-01-13 | End: 2020-01-13 | Stop reason: HOSPADM

## 2020-01-13 RX ORDER — FENTANYL CITRATE 50 UG/ML
INJECTION, SOLUTION INTRAMUSCULAR; INTRAVENOUS AS NEEDED
Status: DISCONTINUED | OUTPATIENT
Start: 2020-01-13 | End: 2020-01-13 | Stop reason: HOSPADM

## 2020-01-13 RX ORDER — PROPOFOL 10 MG/ML
INJECTION, EMULSION INTRAVENOUS AS NEEDED
Status: DISCONTINUED | OUTPATIENT
Start: 2020-01-13 | End: 2020-01-13 | Stop reason: HOSPADM

## 2020-01-13 RX ORDER — PREGABALIN 75 MG/1
75 CAPSULE ORAL 2 TIMES DAILY
Status: DISCONTINUED | OUTPATIENT
Start: 2020-01-13 | End: 2020-01-14 | Stop reason: HOSPADM

## 2020-01-13 RX ORDER — LIDOCAINE HYDROCHLORIDE 10 MG/ML
0.1 INJECTION, SOLUTION EPIDURAL; INFILTRATION; INTRACAUDAL; PERINEURAL AS NEEDED
Status: DISCONTINUED | OUTPATIENT
Start: 2020-01-13 | End: 2020-01-13 | Stop reason: HOSPADM

## 2020-01-13 RX ORDER — SODIUM CHLORIDE 0.9 % (FLUSH) 0.9 %
5-40 SYRINGE (ML) INJECTION AS NEEDED
Status: DISCONTINUED | OUTPATIENT
Start: 2020-01-13 | End: 2020-01-13 | Stop reason: HOSPADM

## 2020-01-13 RX ORDER — CELECOXIB 100 MG/1
200 CAPSULE ORAL 2 TIMES DAILY
Status: DISCONTINUED | OUTPATIENT
Start: 2020-01-13 | End: 2020-01-14 | Stop reason: HOSPADM

## 2020-01-13 RX ADMIN — CELECOXIB 200 MG: 100 CAPSULE ORAL at 18:28

## 2020-01-13 RX ADMIN — PROPOFOL 50 MG: 10 INJECTION, EMULSION INTRAVENOUS at 08:32

## 2020-01-13 RX ADMIN — TRANEXAMIC ACID 1 G: 1 INJECTION, SOLUTION INTRAVENOUS at 09:31

## 2020-01-13 RX ADMIN — CEFAZOLIN SODIUM 2 G: 2 SOLUTION INTRAVENOUS at 19:58

## 2020-01-13 RX ADMIN — GLYCOPYRROLATE 0.4 MG: 0.2 INJECTION INTRAMUSCULAR; INTRAVENOUS at 09:33

## 2020-01-13 RX ADMIN — Medication 50 MG: at 07:45

## 2020-01-13 RX ADMIN — CEFAZOLIN 2 G: 10 INJECTION, POWDER, FOR SOLUTION INTRAVENOUS at 07:43

## 2020-01-13 RX ADMIN — HYDROMORPHONE HYDROCHLORIDE 0.5 MG: 2 INJECTION, SOLUTION INTRAMUSCULAR; INTRAVENOUS; SUBCUTANEOUS at 09:03

## 2020-01-13 RX ADMIN — ONDANSETRON 4 MG: 2 INJECTION INTRAMUSCULAR; INTRAVENOUS at 10:33

## 2020-01-13 RX ADMIN — PREGABALIN 75 MG: 75 CAPSULE ORAL at 18:28

## 2020-01-13 RX ADMIN — ACETAMINOPHEN 1000 MG: 500 TABLET ORAL at 06:54

## 2020-01-13 RX ADMIN — SENNOSIDES AND DOCUSATE SODIUM 1 TABLET: 8.6; 5 TABLET ORAL at 13:03

## 2020-01-13 RX ADMIN — MAGNESIUM SULFATE HEPTAHYDRATE 2 G: 500 INJECTION, SOLUTION INTRAMUSCULAR; INTRAVENOUS at 07:54

## 2020-01-13 RX ADMIN — CELECOXIB 400 MG: 400 CAPSULE ORAL at 06:53

## 2020-01-13 RX ADMIN — ACETAMINOPHEN 1000 MG: 500 TABLET ORAL at 18:28

## 2020-01-13 RX ADMIN — ROCURONIUM BROMIDE 50 MG: 10 INJECTION, SOLUTION INTRAVENOUS at 07:33

## 2020-01-13 RX ADMIN — WARFARIN SODIUM 10 MG: 10 TABLET ORAL at 06:54

## 2020-01-13 RX ADMIN — TRANEXAMIC ACID 1 G: 1 INJECTION, SOLUTION INTRAVENOUS at 07:52

## 2020-01-13 RX ADMIN — CELECOXIB 200 MG: 100 CAPSULE ORAL at 13:03

## 2020-01-13 RX ADMIN — PREGABALIN 75 MG: 75 CAPSULE ORAL at 06:53

## 2020-01-13 RX ADMIN — SENNOSIDES AND DOCUSATE SODIUM 1 TABLET: 8.6; 5 TABLET ORAL at 18:28

## 2020-01-13 RX ADMIN — PHENYLEPHRINE HYDROCHLORIDE 100 MCG: 10 INJECTION INTRAVENOUS at 08:20

## 2020-01-13 RX ADMIN — DEXAMETHASONE SODIUM PHOSPHATE 8 MG: 4 INJECTION, SOLUTION INTRAMUSCULAR; INTRAVENOUS at 07:54

## 2020-01-13 RX ADMIN — NEOSTIGMINE METHYLSULFATE 3 MG: 1 INJECTION, SOLUTION INTRAVENOUS at 09:33

## 2020-01-13 RX ADMIN — CEFAZOLIN SODIUM 2 G: 2 SOLUTION INTRAVENOUS at 13:02

## 2020-01-13 RX ADMIN — SODIUM CHLORIDE, SODIUM LACTATE, POTASSIUM CHLORIDE, AND CALCIUM CHLORIDE 50 ML/HR: 600; 310; 30; 20 INJECTION, SOLUTION INTRAVENOUS at 06:40

## 2020-01-13 RX ADMIN — FAMOTIDINE 20 MG: 20 TABLET, FILM COATED ORAL at 06:54

## 2020-01-13 RX ADMIN — SODIUM CHLORIDE, SODIUM LACTATE, POTASSIUM CHLORIDE, AND CALCIUM CHLORIDE: 600; 310; 30; 20 INJECTION, SOLUTION INTRAVENOUS at 09:03

## 2020-01-13 RX ADMIN — PROPOFOL 50 MG: 10 INJECTION, EMULSION INTRAVENOUS at 08:25

## 2020-01-13 RX ADMIN — LIDOCAINE HYDROCHLORIDE 100 MG: 20 INJECTION, SOLUTION EPIDURAL; INFILTRATION; INTRACAUDAL; PERINEURAL at 07:33

## 2020-01-13 RX ADMIN — SODIUM CHLORIDE, SODIUM LACTATE, POTASSIUM CHLORIDE, AND CALCIUM CHLORIDE: 600; 310; 30; 20 INJECTION, SOLUTION INTRAVENOUS at 07:24

## 2020-01-13 RX ADMIN — ACETAMINOPHEN 1000 MG: 500 TABLET ORAL at 13:03

## 2020-01-13 RX ADMIN — GLYCOPYRROLATE 0.15 MG: 0.2 INJECTION INTRAMUSCULAR; INTRAVENOUS at 07:36

## 2020-01-13 RX ADMIN — PREGABALIN 75 MG: 75 CAPSULE ORAL at 13:04

## 2020-01-13 RX ADMIN — FENTANYL CITRATE 100 MCG: 50 INJECTION INTRAMUSCULAR; INTRAVENOUS at 07:29

## 2020-01-13 RX ADMIN — PROPOFOL 150 MG: 10 INJECTION, EMULSION INTRAVENOUS at 07:33

## 2020-01-13 RX ADMIN — ROCURONIUM BROMIDE 10 MG: 10 INJECTION, SOLUTION INTRAVENOUS at 08:32

## 2020-01-13 RX ADMIN — ONDANSETRON 4 MG: 2 INJECTION INTRAMUSCULAR; INTRAVENOUS at 09:31

## 2020-01-13 RX ADMIN — FAMOTIDINE 20 MG: 20 TABLET, FILM COATED ORAL at 20:00

## 2020-01-13 RX ADMIN — SODIUM CHLORIDE 100 ML/HR: 900 INJECTION, SOLUTION INTRAVENOUS at 15:47

## 2020-01-13 RX ADMIN — FERROUS SULFATE TAB 325 MG (65 MG ELEMENTAL FE) 325 MG: 325 (65 FE) TAB at 18:28

## 2020-01-13 NOTE — PROGRESS NOTES
OT order received and chart reviewed. Patient seen for skilled OT evaluation and is safe for discharge home when medically stable/cleared by PT. Full note to follow.   Thank you for the referral.  Joann Parry MS OTR/L

## 2020-01-13 NOTE — ROUTINE PROCESS
Bedside and Verbal shift change report given to MARIA ESTHER Garcia (oncoming nurse) by Jillian Alva (offgoing nurse). Report included the following information SBAR, Kardex, Procedure Summary, Intake/Output, MAR and Recent Results.

## 2020-01-13 NOTE — CONSULTS
Consult Note    Patient: Lucian Quinn MRN: [id number]  CSN: [id number]    YOB: 1960  Age: 63 y.o. Sex: male    DOA: 1/13/2020 LOS:  LOS: 0 days        Requesting Physician: Jose C Forde MD    Reason for Consultation: Medical management             HPI:     Lucian Quinn is a 63 y.o.  male who has been seen for  Medical  management agfter Lt total hip replacemnet  Pt has h/o hypertension and hyperlipidemia chronic back pain . Pt denied any chest pain SOB after surgery   Pain under control with current pain med .  Urine drug screen positive THC    Recent Results (from the past 24 hour(s))   DRUG SCREEN, URINE    Collection Time: 01/13/20  6:05 AM   Result Value Ref Range    BENZODIAZEPINES NEGATIVE  NEG      BARBITURATES NEGATIVE  NEG      THC (TH-CANNABINOL) POSITIVE (A) NEG      OPIATES NEGATIVE  NEG      PCP(PHENCYCLIDINE) NEGATIVE  NEG      COCAINE NEGATIVE  NEG      AMPHETAMINES NEGATIVE  NEG      METHADONE NEGATIVE  NEG      HDSCOM (NOTE)    TYPE & SCREEN    Collection Time: 01/13/20  6:39 AM   Result Value Ref Range    Crossmatch Expiration 01/16/2020     ABO/Rh(D) B POSITIVE     Antibody screen NEG        Past Medical History:   Diagnosis Date    Anemia     Arthritis     Bilateral sciatica     Chronic pain     Herniated intervertebral disc of lumbar spine     High cholesterol     Hypertension     Muscle spasm of back        Past Surgical History:   Procedure Laterality Date    COLONOSCOPY N/A 1/10/2020    PREVENTAT[facility]COPY [facility] ENDOSCOPY    HX ORTHOPAEDIC Left 1999    hand laceration & ligament repair       Family History   Problem Relation Age of Onset    Hypertension Mother     Cancer Mother     Cancer Father     Diabetes Father        Social History     Socioeconomic History    Marital status: SINGLE     Spouse name: Not on file    Number of children: Not on file    Years of education: Not on file  Highest education level: Not on file   Tobacco Use    Smoking status: Former Smoker     Types: Cigarettes     Last attempt to quit: 2016     Years since quittin.0    Smokeless tobacco: Never Used   Substance and Sexual Activity    Alcohol use: No       Prior to Admission medications    Medication Sig Start Date End Date Taking? Authorizing Provider   oxyCODONE-acetaminophen (PERCOCET) 7.5-325 mg per tablet Take 1-2 Tabs by mouth every four (4) hours as needed for Pain for up to 7 days. Max Daily Amount: 12 Tabs. 20 Yes Sally Daigle PA-C   docusate sodium (COLACE) 100 mg capsule Take 1 Cap by mouth two (2) times a day for 90 days. 20 Yes Sally Daigle PA-C   celecoxib (CELEBREX) 200 mg capsule Take 1 Cap by mouth two (2) times a day for 90 days. 20 Yes Sally Daigle PA-C   aspirin (ASPIRIN) 325 mg tablet Take 1 Tab by mouth two (2) times a day. 20  Yes Sally Daigle PA-C   cephALEXin (KEFLEX) 500 mg capsule Take 1 Cap by mouth four (4) times daily. 20  Yes Sally Daigle PA-C   atorvastatin (LIPITOR) 10 mg tablet Take 10 mg by mouth daily. Yes Provider, Historical   amLODIPine (NORVASC) 10 mg tablet Take 10 mg by mouth daily. Yes Provider, Historical   gabapentin (NEURONTIN) 100 mg capsule Take  by mouth nightly. Yes Provider, Historical   cyclobenzaprine (FLEXERIL) 10 mg tablet Take  by mouth three (3) times daily as needed for Muscle Spasm(s). Yes Provider, Historical   meloxicam (MOBIC) 15 mg tablet Take 1 Tab by mouth daily. 19  Yes Deedee Mancera PA       No Known Allergies    Review of Systems  GENERAL: Patient alert, awake and oriented times 3, able to communicate full sentences and not in distress. HEENT: No change in vision, no earache, tinnitus, sore throat or sinus congestion. NECK: No pain or stiffness. CARDIOVASCULAR: No chest pain or pressure. No palpitations.    PULMONARY: No shortness of breath, cough or wheeze. GASTROINTESTINAL: No abdominal pain, nausea, vomiting or diarrhea, melena or       bright red blood per rectum. Didn't have BM yet not passing flatus  GENITOURINARY: No urinary frequency, urgency, hesitancy or dysuria. Was able to pass urine   MUSCULOSKELETAL:Lt hip surgery    DERMATOLOGIC: No rash, no itching, no lesions. ENDOCRINE: No polyuria, polydipsia, no heat or cold intolerance. No recent change in    weight. HEMATOLOGICAL: H/o  anemia or easy bruising or bleeding. NEUROLOGIC: No headache, seizures, numbness, tingling or weakness. PSYCHIATRIC: No depression, anxiety, mood disorder, no loss of interest in normal       activity or change in sleep pattern. Physical Exam:      Visit Vitals  /87 (BP 1 Location: Left arm, BP Patient Position: At rest)   Pulse 78   Temp 97.4 °F (36.3 °C)   Resp 16   Ht 6' 1\" (1.854 m)   Wt 80.3 kg (177 lb)   SpO2 99%   BMI 23.35 kg/m²    O2 Flow Rate (L/min): 3 l/min O2 Device: Room air    Temp (24hrs), Av.8 °F (36.6 °C), Min:97.4 °F (36.3 °C), Max:98.5 °F (36.9 °C)     0701 -  1900  In: 1000 [I.V.:1000]  Out: 100    No intake/output data recorded. General:  Alert, cooperative, no distress, appears stated age. Head:  Normocephalic, without obvious abnormality, atraumatic. Eyes:  Conjunctivae/corneas clear. PERRL, EOMs intact. Nose: Nares normal. No drainage or sinus tenderness. Throat: Lips, mucosa, and tongue  dry   Neck: Supple, symmetrical, trachea midline, no adenopathy, thyroid: no enlargement/tenderness/nodules, no carotid bruit and no JVD. Back:   ROM normal. No CVA tenderness. Lungs:   Clear to auscultation bilaterally. Chest wall:  No tenderness or deformity. Heart:  Regular rate and rhythm, S1, S2 normal, no murmur, click, rub or gallop. Abdomen: Soft, non-tender. Bowel sounds normal. No masses,  No organomegaly.    Extremities: S/p total Lt hip replacement dressing clean pain under control   Pulses: 2+ and symmetric all extremities. Skin: Skin color, texture, turgor normal. No rashes or lesions   Neurologic: CNII-XII intact. No focal motor or sensory deficit. Labs Reviewed: All lab results for the last 24 hours reviewed.     Procedures/imaging: see electronic medical records for all procedures/Xrays and details which were not copied into this note but were reviewed prior to creation of Plan        Assessment/Plan     Active Problems:    Hip arthritis (1/13/2020)        Plan    S/P Lt total hip replacement  DVT prophylaxis per ortho  Pt and ot  Pt will stay with his brother and would like to have home health at [facility]   Tylenol, celebrex and flexeril   Oxycodone prn  Monitor BM     Hypertension   Continue Norvasc 10 mg daily  Monitor Bp    Hyperlipidemia  lipitor 10 mg qhs  F/u liver function    Anemia   Continue ferrous sulfate   Monitor cbc   Add Pepcid 20 mg BID     DVT prophylaxis per ortho   Pt and ot   Set up [facility] pt will stay with hsi brother   Cbc, cmp,mag , iron profile ,Z19 and folic acid  in AM       Brent Fleischer, MD  1/13/2020 12:00 PM    .

## 2020-01-13 NOTE — ROUTINE PROCESS
Patient admitted to the floor from PACU. He is awake and A&O. No distress noted. Patient oriented to the room and how to use the call bell. No neurovascular deficits noted.

## 2020-01-13 NOTE — PROGRESS NOTES
conducted a pre-surgery visit with Delilah Villar, who is a 63 y. o.,male. The  provided the following Interventions:  Initiated a relationship of care and support. Plan:  Chaplains will continue to follow and will provide pastoral care on an as needed/requested basis.  recommends bedside caregivers page  on duty if patient shows signs of acute spiritual or emotional distress.     [address]  [phone]

## 2020-01-13 NOTE — DISCHARGE SUMMARY
1/13/2020  5:48 AM    1/13/2020, 9:51 AM    Primary Dx:left Orthopedic / Rheumatologic: Total Hip Replacement  Secondary Dx: Etiological Diagnoses: none    HPI:  Pt has end stage OA and had failed conservative treatment. Due to the current findings and affected activity of daily living surgical intervention is indicated.   The alternatives, risks, complications as well as expected outcome were discussed, the patient understands and wishes to proceed with surgery    Past Medical History:   Diagnosis Date    Anemia     Arthritis     Bilateral sciatica     Chronic pain     Herniated intervertebral disc of lumbar spine     High cholesterol     Hypertension     Muscle spasm of back          Current Facility-Administered Medications:     lidocaine (PF) (XYLOCAINE) 10 mg/mL (1 %) injection 0.1 mL, 0.1 mL, SubCUTAneous, PRN, Rosalie Riggins CRNA    lactated Ringers infusion, 50 mL/hr, IntraVENous, CONTINUOUS, Adarsh Riggins CRNA, Last Rate: 50 mL/hr at 01/13/20 0640, 50 mL/hr at 01/13/20 0640    bupivacaine liposome (PF) susp (EXPAREL) infiltration 266 mg, 20 mL, Infiltration, ONCE, Grant Nuno, KANE    bupivacaine (PF) 0.5 % (5 mg/mL) 25 mL, EPINEPHrine HCl (PF) 0.5 mg, ketorolac 30 mg, bupivacaine liposome (PF) susp 20 mL in 0.9% sodium chloride 50 mL iv solution, , , PRN, Ernesto Shafer MD, 96.5 mL at 01/13/20 0855    vancomycin (VANCOCIN) injection, , , PRN, Ernesto Shafer MD, 3 g at 01/13/20 0817    sodium chloride 0.9 % bolus infusion, , , CONTINUOUS, Ernesto Shafer MD, 50 mL at 01/13/20 0817    polymyxin B 500,000 unit injection, , , PRN, Ernesto Shafer MD, 750,000 Units at 01/13/20 0817    povidone-iodine (BETADINE) 10 % topical solution, , , PRN, Ernesto Shafer MD, 17.5 mL at 01/13/20 0818    Facility-Administered Medications Ordered in Other Encounters:     lactated Ringers infusion, , IntraVENous, CONTINUOUS, Susan Antony CRNA    fentaNYL citrate (PF) injection, , , PRN, Cathern Adjutant, CRNA, 100 mcg at 01/13/20 3887    propofol (DIPRIVAN) 10 mg/mL injection, , IntraVENous, PRN, Cathern Adjutant, CRNA, 50 mg at 01/13/20 1847    rocuronium injection, , IntraVENous, PRN, Cathern Adjutant, CRNA, 10 mg at 01/13/20 5525    lidocaine (PF) (XYLOCAINE) 20 mg/mL (2 %) injection, , IntraVENous, PRN, Cathern Adjutant, CRNA, 100 mg at 01/13/20 9300    dexamethasone (DECADRON) 4 mg/mL injection, , IntraVENous, PRN, Cathern Adjutant, CRNA, 8 mg at 01/13/20 0754    magnesium sulfate injection, , , PRN, Cathern Adjutant, CRNA, 2 g at 01/13/20 0754    glycopyrrolate (ROBINUL) injection, , IntraVENous, PRN, Cathern Adjutant, CRNA, 0.4 mg at 01/13/20 0933    PHENYLephrine (TAYO-SYNEPHRINE) 10 mg in 0.9% sodium chloride 100 mL infusion, , IntraVENous, CONTINUOUS, Cathern Adjutant, CRNA, 100 mcg at 01/13/20 0820    HYDROmorphone (PF) (DILAUDID) injection, , , PRN, Cathern Adjutant, CRNA, 0.5 mg at 01/13/20 2482    ondansetron (ZOFRAN) injection, , IntraVENous, PRN, Cathern Adjutant, CRNA, 4 mg at 01/13/20 0931    neostigmine (PROSTIGMINE) injection, , IntraVENous, PRN, Cathern Adjutant, CRNA, 3 mg at 01/13/20 0933    ketamine (KETALAR) 50 mg/mL (1 mL) injection, , , PRN, Cathern Adjutant, CRNA, 50 mg at 01/13/20 0745    Patient has no known allergies. Physical Exam:  General A&O x3 NAD, well developed, well nourished, normal affect  Heart: S1-S2, RRR  Lungs: CTA Bilat  Abd: soft NT, ND  Ext: n/v intact    Hospital Course:    Pt. Had leftOrthopedic / Rheumatologic: Total Hip Replacement    Post -op Course: The patient tolerated the procedure well. They were followed by internal medicine for help with medical management. Pt. Was place on Abx pre and post-op for prophylaxis against infection as well as coumadin pre and post-op for prophylaxis against DVT. Vitals signs remained stable, remained af. The wound wasclean, dry, no drainage. Pain was well controlled. Pt.  Had negative calf tenderness or swelling, no evidence for DVT. Patient had PT/OT consult for evaluation and treatment. CBC  Lab Results   Component Value Date/Time    WBC 5.1 12/30/2019 11:08 AM    RBC 5.06 12/30/2019 11:08 AM    HCT 35.2 (L) 12/30/2019 11:08 AM    MCV 69.6 (L) 12/30/2019 11:08 AM    MCH 22.3 (L) 12/30/2019 11:08 AM    MCHC 32.1 12/30/2019 11:08 AM    RDW 15.1 (H) 12/30/2019 11:08 AM     Coagulation  Lab Results   Component Value Date    INR 1.0 12/30/2019    APTT 32.5 12/30/2019      Basic Metabolic Profile  Lab Results   Component Value Date     12/30/2019    CO2 30 12/30/2019    BUN 13 12/30/2019       Discharge Meds:  Current Discharge Medication List      START taking these medications    Details   oxyCODONE-acetaminophen (PERCOCET) 7.5-325 mg per tablet Take 1-2 Tabs by mouth every four (4) hours as needed for Pain for up to 7 days. Max Daily Amount: 12 Tabs. Qty: 60 Tab, Refills: 0    Associated Diagnoses: Hip arthritis      docusate sodium (COLACE) 100 mg capsule Take 1 Cap by mouth two (2) times a day for 90 days. Qty: 60 Cap, Refills: 2    Associated Diagnoses: Hip arthritis      celecoxib (CELEBREX) 200 mg capsule Take 1 Cap by mouth two (2) times a day for 90 days. Qty: 60 Cap, Refills: 2    Associated Diagnoses: Hip arthritis      aspirin (ASPIRIN) 325 mg tablet Take 1 Tab by mouth two (2) times a day. Qty: 60 Tab, Refills: 0    Associated Diagnoses: Hip arthritis      cephALEXin (KEFLEX) 500 mg capsule Take 1 Cap by mouth four (4) times daily. Qty: 8 Cap, Refills: 0    Associated Diagnoses: Hip arthritis         CONTINUE these medications which have NOT CHANGED    Details   atorvastatin (LIPITOR) 10 mg tablet Take 10 mg by mouth daily. amLODIPine (NORVASC) 10 mg tablet Take 10 mg by mouth daily. gabapentin (NEURONTIN) 100 mg capsule Take  by mouth nightly.       cyclobenzaprine (FLEXERIL) 10 mg tablet Take  by mouth three (3) times daily as needed for Muscle Spasm(s). STOP taking these medications       meloxicam (MOBIC) 15 mg tablet Comments:   Reason for Stopping:               Discharge Plan:  The patient will be d/c'd to home, total hip protocol,  WBAT. he will have [facility] PT and nursing. Total joint protocol. Pt safe for homebound transfer, sp Total joint replacement. A walker, bedside commode, and shower chair will be utilized for ADL's. Follow up with Dr. Pia Garcia in 10-12 days. Call with any questions or concerns.

## 2020-01-13 NOTE — PROGRESS NOTES
Problem: Mobility Impaired (Adult and Pediatric)  Goal: *Acute Goals and Plan of Care (Insert Text)  Description  Physical Therapy Goals  Initiated 1/13/2020 and to be accomplished within 7 day(s)  1. Patient will move from supine to sit and sit to supine , scoot up and down and roll side to side in bed with modified independence. 2.  Patient will transfer from bed to chair and chair to bed with modified independence using the least restrictive device. 3.  Patient will perform sit to stand with modified independence. 4.  Patient will ambulate with modified independence for 250 feet with the least restrictive device. 5.  Patient will ascend/descend 8 stairs with 1 handrail(s) with supervision/set-up. Prior Level of Function:   Patient was independence for all mobility including gait using no AD. Patient lives alone in an apartment no steps but will be staying with his brother on the 3rd floor (4 steps to enter R HRA and 8 steps to 3rd level with R HRA). Pt has a rolling walker, bedside commode, and shower chair. Outcome: Progressing Towards Goal   PHYSICAL THERAPY EVALUATION    Patient: Leslie Torres (60 y.o. male)  Date: 1/13/2020  Primary Diagnosis: Osteoarthritis of left hip, unspecified osteoarthritis type [M16.12]  Hip arthritis [M16.10]  Procedure(s) (LRB):  LEFT TOTAL HIP ARTHROPLASTY DIRECT ANTERIOR APPROACH (Left) Day of Surgery   Precautions:   Fall, WBAT, Total hip    ASSESSMENT :  PT orders received and patient cleared by nursing to participate with therapy. Patient is a 63 y.o. male admitted to the hospital due to s/p Left total hip arthroplasty Dr. Ferrell Snellen 1/13/20. Patient consents to PT evaluation and treatment. Pt educated on safety, mobility, therex, total hip precautions, and OOB 3-5 times with nursing assistance. Pt performed supine to sit with head of bed elevated standby assistance. Sit to stands from elevated bed standby assistance. Gait 100 feet RW standby assistance. Pt has step to pattern leading with L LE. Practiced stairs 2 HRA initially then 1 HRA for total 4 steps contact guard assistance/standby assistance. Pt ended therapy sitting in recliner with all needs met. Pt is cleared by PT for safe d/c home with family assistance, nursing informed. Patient will benefit from skilled intervention to address the above impairments. Patient's rehabilitation potential is considered to be Good  Factors which may influence rehabilitation potential include:    []         None noted  []         Mental ability/status  [x]         Medical condition  [x]         Home/family situation and support systems  [x]         Safety awareness  []         Pain tolerance/management  []         Other:      PLAN :  Recommendations and Planned Interventions:    [x]           Bed Mobility Training             [x]    Neuromuscular Re-Education  [x]           Transfer Training                   []    Orthotic/Prosthetic Training  [x]           Gait Training                          [x]    Modalities  [x]           Therapeutic Exercises           [x]    Edema Management/Control  [x]           Therapeutic Activities            [x]    Family Training/Education  [x]           Patient Education  []           Other (comment):    Frequency/Duration: Patient will be followed by physical therapy 1-2 times per day/4-7 days per week to address goals. Discharge Recommendations: Home Health  Further Equipment Recommendations for Discharge: N/A     SUBJECTIVE:   Patient stated feels like it's raising (pain).     OBJECTIVE DATA SUMMARY:     Past Medical History:   Diagnosis Date    Anemia     Arthritis     Bilateral sciatica     Chronic pain     Herniated intervertebral disc of lumbar spine     High cholesterol     Hypertension     Muscle spasm of back      Past Surgical History:   Procedure Laterality Date    COLONOSCOPY N/A 1/10/2020    PREVENTATIVE COLONOSCOPY performed by Tiago Lion MD at [facility] ENDOSCOPY    HX ORTHOPAEDIC Left 1999    hand laceration & ligament repair     Barriers to Learning/Limitations: None  Compensate with: N/A  Home Situation:  Home Situation  Home Environment: Apartment  # Steps to Enter: 0  One/Two Story Residence: One story  Living Alone: Yes  Support Systems: Family member(s)  Patient Expects to be Discharged to[de-identified] Private residence  Current DME Used/Available at Home: Malcolm Chapman, rolling, Shower chair, Commode, bedside  Critical Behavior:  Neurologic State: Alert  Orientation Level: Oriented X4  Cognition: Follows commands  Safety/Judgement: Fall prevention; Awareness of environment  Psychosocial  Patient Behaviors: Calm; Cooperative  Family  Behaviors: Calm; Cooperative  Skin Condition/Temp: Warm  Family  Behaviors: Calm; Cooperative  Skin Integrity: Incision (comment)(Lt.Hip; drsg D&I)  Skin Integumentary  Skin Color: Appropriate for ethnicity  Skin Condition/Temp: Warm  Skin Integrity: Incision (comment)(Lt.Hip; drsg D&I)     B LE Strength:    Strength: Generally decreased, functional              B LE Tone & Sensation:   Tone: Normal          Sensation: Intact           B LE Range Of Motion:  AROM: Generally decreased, functional                 Posture:  Posture (WDL): Within defined limits     Functional Mobility:  Bed Mobility:     Supine to Sit: Stand-by assistance     Scooting: Stand-by assistance  Transfers:  Sit to Stand: Stand-by assistance   Stand to Sit: Stand-by assistance                       Balance:   Sitting: Intact  Standing: Intact; With support    Ambulation/Gait Training:  Distance (ft): 100 Feet (ft)  Assistive Device: Walker, rolling  Ambulation - Level of Assistance: Stand-by assistance     Gait Abnormalities: Step to gait  Right Side Weight Bearing: Full  Left Side Weight Bearing: As tolerated        Speed/Briana: Fluctuations  Step Length: Right shortened;Left shortened                 Stairs:  Number of Stairs Trained: 4  Stairs - Level of Assistance: Contact guard assistance  Rail Use: (B HRA 2 steps then 1 HRA last 2 steps)     Therapeutic Exercises:   Reviewed and performed ankle pumps to increase blood flow and circulation. Pain:  Pain level pre-treatment: 5/10 L hip  Pain level post-treatment: same  Pain Intervention(s) : Medication (see MAR); Rest, Ice, Repositioning  Response to intervention: Nurse notified, See doc flow    Activity Tolerance:   good  Please refer to the flowsheet for vital signs taken during this treatment. After treatment:   [x]         Patient left in no apparent distress sitting up in chair  []         Patient left in no apparent distress in bed  [x]         Call bell left within reach  [x]   Personal items in reach   [x]         Nursing notified Payam Michel  [x]         Caregiver present  []         Bed/chair alarm activated  [x]         SCDs applied     COMMUNICATION/EDUCATION:   [x]         Role of Physical Therapy in the acute care setting. [x]         Fall prevention education was provided and the patient/caregiver indicated understanding. [x]         Patient/family have participated as able in goal setting and plan of care. [x]         Patient/family agree to work toward stated goals and plan of care. []         Patient understands intent and goals of therapy, but is neutral about his/her participation. []         Patient is unable to participate in goal setting/plan of care: ongoing with therapy staff. [x]         Out of bed with nursing assistance 3-5 times a day. []         Other:     Thank you for this referral.  Shilpa Spivey, PT, DPT   Time Calculation: 28 mins      Eval Complexity: History: LOW Complexity : Zero comorbidities / personal factors that will impact the outcome / POCExam:HIGH Complexity : 4+ Standardized tests and measures addressing body structure, function, activity limitation and / or participation in recreation  Presentation: LOW Complexity : Stable, uncomplicated  Clinical Decision Making:Low Complexity Overall Complexity:LOW

## 2020-01-13 NOTE — INTERVAL H&P NOTE
H&P Update:  Rose Marie Torres was seen and examined. History and physical has been reviewed. The patient has been examined.  There have been no significant clinical changes since the completion of the originally dated History and Physical.

## 2020-01-13 NOTE — BRIEF OP NOTE
BRIEF OPERATIVE NOTE    Date of Procedure: 1/13/2020   Preoperative Diagnosis: Osteoarthritis of left hip, unspecified osteoarthritis type [M16.12]  Postoperative Diagnosis: Osteoarthritis of left hip, unspecified osteoarthritis type [M16.12]    Procedure(s):  LEFT TOTAL HIP ARTHROPLASTY DIRECT ANTERIOR APPROACH  Surgeon(s) and Role:     * Derek Krishnamurthy MD - Primary         Surgical Assistant: Lizet Gastelum    Surgical Staff:  Circ-1: Sandy Arnold  Physician Assistant: Juan C Stallworth PA-C  Radiology Technician: Yuliya Tubbs Tech-1: Sherry Dimas  Surg Asst-1: Sherie Arevalo  Event Time In Time Out   Incision Start 0121    Incision Close       Anesthesia: General   Estimated Blood Loss: 275ml  Specimens:   ID Type Source Tests Collected by Time Destination   1 : Left Femoral Head Preservative Hip, left  Derek Krishnamurthy MD 1/13/2020 4435 Pathology      Findings: same   Complications: none  Implants:   Implant Name Type Inv.  Item Serial No.  Lot No. LRB No. Used Action   SHELL ACET CLUS H 58F TRTANIUM -- TRIDENT II - [id number]  SHELL ACET CLUS H 58F TRTANIUM -- TRIDENT II  [employer] HOW [id number] Left 1 Implanted   SCR HEX 6.5X25MM -- TRIDENT II - [id number]  SCR HEX 6.5X25MM -- TRIDENT II  [employer] HOW [id number] Left 1 Implanted   INSERT ACET 0DEG 36MM F X3 -- TRIDENT - [id number]  INSERT ACET 0DEG 36MM F X3 -- TRIDENT  [employer] HOW [id number] Left 1 Implanted   STEM FEM SZ 7 127D [id number] -- ACCOLADE II V40 - [id number]  STEM FEM SZ 7 127D [id number] -- ACCOLADE II V40  [employer] HOW [id number] Left 1 Implanted   HEAD FEM DELT V40 +2.5MM 36MM -- V40 BIOLOX - [id number]  HEAD FEM DELT V40 +2.5MM 36MM -- V40 BIOLOX  [employer] HOW [id number] Left 1 Implanted

## 2020-01-13 NOTE — HOME CARE
Patient planned for discharge today. Spoke with Norm Childers at [facility] office, patient will be serviced by that location. Per Gabriela, no additional documentation needed from  or this location prior to discharge. [facility] will follow up with patient. Franki Meyer is aware.     Yuliet Nevarez, PHILIP   [address] [facility]  [phone]

## 2020-01-13 NOTE — PERIOP NOTES
TRANSFER - OUT REPORT:    Verbal report given to Reji Rose RN on Denilson Torres  being transferred to [facility] for routine post - op       Report consisted of patients Situation, Background, Assessment and   Recommendations(SBAR). Information from the following report(s) SBAR, OR Summary, Procedure Summary, Intake/Output, MAR and Recent Results was reviewed with the receiving nurse. Lines:   Peripheral IV 01/13/20 Right Wrist (Active)   Site Assessment Clean, dry, & intact 1/13/2020 10:05 AM   Phlebitis Assessment 0 1/13/2020 10:05 AM   Infiltration Assessment 0 1/13/2020 10:05 AM   Dressing Status Clean, dry, & intact 1/13/2020 10:05 AM   Dressing Type Transparent 1/13/2020 10:05 AM   Hub Color/Line Status Pink; Infusing;Patent 1/13/2020 10:05 AM        Opportunity for questions and clarification was provided.       Patient transported with:   [facility]

## 2020-01-13 NOTE — ANESTHESIA POSTPROCEDURE EVALUATION
Procedure(s):  LEFT TOTAL HIP ARTHROPLASTY DIRECT ANTERIOR APPROACH. general    Anesthesia Post Evaluation      Multimodal analgesia: multimodal analgesia used between 6 hours prior to anesthesia start to PACU discharge  Patient location during evaluation: bedside  Patient participation: complete - patient participated  Level of consciousness: awake  Pain score: 0  Pain management: adequate  Airway patency: patent  Anesthetic complications: no  Cardiovascular status: stable  Respiratory status: acceptable  Hydration status: acceptable  Post anesthesia nausea and vomiting:  controlled      Vitals Value Taken Time   /81 1/13/2020 10:35 AM   Temp 36.5 °C (97.7 °F) 1/13/2020 10:07 AM   Pulse 71 1/13/2020 10:44 AM   Resp 16 1/13/2020 10:44 AM   SpO2 83 % 1/13/2020 10:44 AM   Vitals shown include unvalidated device data.

## 2020-01-13 NOTE — PROGRESS NOTES
Patient discharging to [place], [place], [address]. Brother, Fredi Colon, [phone] is taking him to his home. [phone] to reach patient. Patient already has walker and shower chair, asking to take a urinal home. Already set up with [facility] in [place].          CINDY Kim  Case Management  [phone]

## 2020-01-13 NOTE — ANESTHESIA PREPROCEDURE EVALUATION
Relevant Problems   No relevant active problems       Anesthetic History   No history of anesthetic complications            Review of Systems / Medical History  Patient summary reviewed and pertinent labs reviewed    Pulmonary  Within defined limits                 Neuro/Psych   Within defined limits           Cardiovascular    Hypertension: well controlled              Exercise tolerance: >4 METS     GI/Hepatic/Renal  Within defined limits              Endo/Other        Arthritis     Other Findings   Comments: Uses Marijuana.            Physical Exam    Airway  Mallampati: II  TM Distance: 4 - 6 cm  Neck ROM: normal range of motion   Mouth opening: Normal     Cardiovascular  Regular rate and rhythm,  S1 and S2 normal,  no murmur, click, rub, or gallop             Dental  No notable dental hx       Pulmonary  Breath sounds clear to auscultation               Abdominal  GI exam deferred       Other Findings            Anesthetic Plan    ASA: 2  Anesthesia type: general          Induction: Intravenous  Anesthetic plan and risks discussed with: Patient

## 2020-01-14 VITALS
DIASTOLIC BLOOD PRESSURE: 78 MMHG | SYSTOLIC BLOOD PRESSURE: 140 MMHG | HEIGHT: 73 IN | WEIGHT: 177 LBS | BODY MASS INDEX: 23.46 KG/M2 | RESPIRATION RATE: 16 BRPM | TEMPERATURE: 97.9 F | OXYGEN SATURATION: 99 % | HEART RATE: 70 BPM

## 2020-01-14 LAB
ALBUMIN SERPL-MCNC: 3.2 G/DL (ref 3.4–5)
ALBUMIN/GLOB SERPL: 0.9 {RATIO} (ref 0.8–1.7)
ALP SERPL-CCNC: 48 U/L (ref 45–117)
ALT SERPL-CCNC: 16 U/L (ref 16–61)
ANION GAP SERPL CALC-SCNC: 6 MMOL/L (ref 3–18)
AST SERPL-CCNC: 22 U/L (ref 10–38)
BASOPHILS # BLD: 0 K/UL (ref 0–0.06)
BASOPHILS NFR BLD: 0 % (ref 0–3)
BILIRUB SERPL-MCNC: 0.6 MG/DL (ref 0.2–1)
BUN SERPL-MCNC: 17 MG/DL (ref 7–18)
BUN/CREAT SERPL: 18 (ref 12–20)
CALCIUM SERPL-MCNC: 8.7 MG/DL (ref 8.5–10.1)
CHLORIDE SERPL-SCNC: 105 MMOL/L (ref 100–111)
CO2 SERPL-SCNC: 27 MMOL/L (ref 21–32)
CREAT SERPL-MCNC: 0.96 MG/DL (ref 0.6–1.3)
DIFFERENTIAL METHOD BLD: ABNORMAL
EOSINOPHIL # BLD: 0 K/UL (ref 0–0.4)
EOSINOPHIL NFR BLD: 0 % (ref 0–5)
ERYTHROCYTE [DISTWIDTH] IN BLOOD BY AUTOMATED COUNT: 14.5 % (ref 11.6–14.5)
FOLATE SERPL-MCNC: 13.1 NG/ML (ref 3.1–17.5)
GLOBULIN SER CALC-MCNC: 3.5 G/DL (ref 2–4)
GLUCOSE SERPL-MCNC: 126 MG/DL (ref 74–99)
HCT VFR BLD AUTO: 31.9 % (ref 36–48)
HGB BLD-MCNC: 10.4 G/DL (ref 13–16)
LYMPHOCYTES # BLD: 1.9 K/UL (ref 0.8–3.5)
LYMPHOCYTES NFR BLD: 12 % (ref 20–51)
MAGNESIUM SERPL-MCNC: 2.4 MG/DL (ref 1.6–2.6)
MCH RBC QN AUTO: 22.4 PG (ref 24–34)
MCHC RBC AUTO-ENTMCNC: 32.6 G/DL (ref 31–37)
MCV RBC AUTO: 68.6 FL (ref 74–97)
MONOCYTES # BLD: 1.7 K/UL (ref 0–1)
MONOCYTES NFR BLD: 11 % (ref 2–9)
NEUTS SEG # BLD: 12.3 K/UL (ref 1.8–8)
NEUTS SEG NFR BLD: 77 % (ref 42–75)
PLATELET # BLD AUTO: 237 K/UL (ref 135–420)
PLATELET COMMENTS,PCOM: ABNORMAL
PMV BLD AUTO: 9.4 FL (ref 9.2–11.8)
POTASSIUM SERPL-SCNC: 4 MMOL/L (ref 3.5–5.5)
PROT SERPL-MCNC: 6.7 G/DL (ref 6.4–8.2)
RBC # BLD AUTO: 4.65 M/UL (ref 4.7–5.5)
RBC MORPH BLD: ABNORMAL
SODIUM SERPL-SCNC: 138 MMOL/L (ref 136–145)
VIT B12 SERPL-MCNC: 419 PG/ML (ref 211–911)
WBC # BLD AUTO: 15.9 K/UL (ref 4.6–13.2)

## 2020-01-14 PROCEDURE — 80053 COMPREHEN METABOLIC PANEL: CPT

## 2020-01-14 PROCEDURE — 85025 COMPLETE CBC W/AUTO DIFF WBC: CPT

## 2020-01-14 PROCEDURE — 74011250637 HC RX REV CODE- 250/637: Performed by: ORTHOPAEDIC SURGERY

## 2020-01-14 PROCEDURE — 97110 THERAPEUTIC EXERCISES: CPT

## 2020-01-14 PROCEDURE — 97530 THERAPEUTIC ACTIVITIES: CPT

## 2020-01-14 PROCEDURE — 74011250636 HC RX REV CODE- 250/636: Performed by: ORTHOPAEDIC SURGERY

## 2020-01-14 PROCEDURE — 36415 COLL VENOUS BLD VENIPUNCTURE: CPT

## 2020-01-14 PROCEDURE — 74011250637 HC RX REV CODE- 250/637: Performed by: FAMILY MEDICINE

## 2020-01-14 PROCEDURE — 82607 VITAMIN B-12: CPT

## 2020-01-14 PROCEDURE — 83735 ASSAY OF MAGNESIUM: CPT

## 2020-01-14 PROCEDURE — 97116 GAIT TRAINING THERAPY: CPT

## 2020-01-14 RX ORDER — POLYETHYLENE GLYCOL 3350 17 G/17G
17 POWDER, FOR SOLUTION ORAL DAILY
Status: DISCONTINUED | OUTPATIENT
Start: 2020-01-14 | End: 2020-01-14 | Stop reason: HOSPADM

## 2020-01-14 RX ADMIN — FERROUS SULFATE TAB 325 MG (65 MG ELEMENTAL FE) 325 MG: 325 (65 FE) TAB at 08:05

## 2020-01-14 RX ADMIN — CEFAZOLIN SODIUM 2 G: 2 SOLUTION INTRAVENOUS at 04:05

## 2020-01-14 RX ADMIN — SENNOSIDES AND DOCUSATE SODIUM 1 TABLET: 8.6; 5 TABLET ORAL at 08:05

## 2020-01-14 RX ADMIN — OXYCODONE HYDROCHLORIDE 10 MG: 5 TABLET ORAL at 04:08

## 2020-01-14 RX ADMIN — CELECOXIB 200 MG: 100 CAPSULE ORAL at 08:05

## 2020-01-14 RX ADMIN — OXYCODONE HYDROCHLORIDE 10 MG: 5 TABLET ORAL at 13:46

## 2020-01-14 RX ADMIN — PREGABALIN 75 MG: 75 CAPSULE ORAL at 08:05

## 2020-01-14 RX ADMIN — FAMOTIDINE 20 MG: 20 TABLET, FILM COATED ORAL at 08:05

## 2020-01-14 RX ADMIN — AMLODIPINE BESYLATE 10 MG: 10 TABLET ORAL at 08:06

## 2020-01-14 RX ADMIN — ASPIRIN 325 MG: 325 TABLET, DELAYED RELEASE ORAL at 09:31

## 2020-01-14 RX ADMIN — ACETAMINOPHEN 1000 MG: 500 TABLET ORAL at 05:51

## 2020-01-14 RX ADMIN — ACETAMINOPHEN 1000 MG: 500 TABLET ORAL at 11:51

## 2020-01-14 NOTE — PROGRESS NOTES
Ortho    Pt. Seen and evaluated. Doing well, pain well controlled, progressed well with PT  Denies cp, sob, abd pain    Visit Vitals  /79 (BP 1 Location: Right arm, BP Patient Position: At rest)   Pulse 69   Temp 97.8 °F (36.6 °C)   Resp 16   Ht 6' 1\" (1.854 m)   Wt 177 lb (80.3 kg)   SpO2 99%   BMI 23.35 kg/m²       left total hip replacement  left hip Woundclean, dry, no drainage  Sensory intact to LT  Motor intact  nv intact  Neg calf tenderness. Labs. CBC  @  CBC:   Lab Results   Component Value Date/Time    WBC 15.9 (H) 01/14/2020 05:15 AM    RBC 4.65 (L) 01/14/2020 05:15 AM    HGB 10.4 (L) 01/14/2020 05:15 AM    HCT 31.9 (L) 01/14/2020 05:15 AM    PLATELET 981 74/46/2169 05:15 AM    and BMP:   Lab Results   Component Value Date/Time    Glucose 126 (H) 01/14/2020 05:15 AM    Sodium 138 01/14/2020 05:15 AM    Potassium 4.0 01/14/2020 05:15 AM    Chloride 105 01/14/2020 05:15 AM    CO2 27 01/14/2020 05:15 AM    BUN 17 01/14/2020 05:15 AM    Creatinine 0.96 01/14/2020 05:15 AM    Calcium 8.7 01/14/2020 05:15 AM   @  Coagulation  Lab Results   Component Value Date    INR 1.0 12/30/2019    APTT 32.5 12/30/2019      Basic Metabolic Profile  Lab Results   Component Value Date     01/14/2020    CO2 27 01/14/2020    BUN 17 01/14/2020         Assesment:leftOrthopedic / Rheumatologic: Total Hip Replacement  Past Medical History:   Diagnosis Date    Anemia     Arthritis     Bilateral sciatica     Chronic pain     Herniated intervertebral disc of lumbar spine     High cholesterol     Hypertension     Muscle spasm of back      ASA: 2    Pt is status post joint replacement and at risk for bleeding, blood clots, and infection.      Plan:  aspirin, PT, home today

## 2020-01-14 NOTE — PROGRESS NOTES
Problem: Mobility Impaired (Adult and Pediatric)  Goal: *Acute Goals and Plan of Care (Insert Text)  Description  Physical Therapy Goals  Initiated 1/13/2020 and to be accomplished within 7 day(s)  1. Patient will move from supine to sit and sit to supine , scoot up and down and roll side to side in bed with modified independence. 2.  Patient will transfer from bed to chair and chair to bed with modified independence using the least restrictive device. 3.  Patient will perform sit to stand with modified independence. 4.  Patient will ambulate with modified independence for 250 feet with the least restrictive device. 5.  Patient will ascend/descend 8 stairs with 1 handrail(s) with supervision/set-up. Prior Level of Function:   Patient was independence for all mobility including gait using no AD. Patient lives alone in an apartment no steps but will be staying with his brother on the 3rd floor (4 steps to enter R HRA and 8 steps to 3rd level with R HRA). Pt has a rolling walker, bedside commode, and shower chair. Outcome: Progressing Towards Goal   PHYSICAL THERAPY TREATMENT    Patient: Ju Torres (60 y.o. male)  Date: 1/14/2020  Diagnosis: Osteoarthritis of left hip, unspecified osteoarthritis type [M16.12]  Hip arthritis [M16.10]  Hip arthritis [M16.10]   <principal problem not specified>  Procedure(s) (LRB):  LEFT TOTAL HIP ARTHROPLASTY DIRECT ANTERIOR APPROACH (Left) 1 Day Post-Op  Precautions: Fall, Total hip, WBAT      ASSESSMENT:  Pt found seated in recliner willing to participate w/ therapy. Pt cont to make functional progress w/ mobility this visit, voicing little to no pain w/ all mobility tasks. Pt also able to greatly improve in mobility quality this visit, only displaying slight antalgic pattern for increased soreness from surgical site. Pt voices great improvements in overall pain compared to PTA.  Pt amb for a total of 750' this visit, maintaining good pacing w/ a reciprocal patterning. Pt also safely performed 8 steps this visit using only right hand railing, displaying good carryover for sequencing from previous tx. When ascending, pt displays decreased knee/hip flexion on left LE, however w/ cueing, pt able to progress through hip hike and circumduction. Pt returned to room to recliner, provided further education on safety, ice timing, and recommendations to alleviate stiffness, and left in room w/ all needs in reach. Nursing notified and HEP provided to improve stability and strength of hip to progress past painful patterns w/ mobility. Progression toward goals: good   [x]      Improving appropriately and progressing toward goals  []      Improving slowly and progressing toward goals  []      Not making progress toward goals and plan of care will be adjusted     PLAN:  Patient continues to benefit from skilled intervention to address the above impairments. Continue treatment per established plan of care. Discharge Recommendations:  Home Health and Outpatient  Further Equipment Recommendations for Discharge: Has RW     SUBJECTIVE:   Patient stated This is way better than before the surgery.     OBJECTIVE DATA SUMMARY:   Critical Behavior:  Neurologic State: Alert  Orientation Level: Oriented X4  Cognition: Appropriate decision making, Follows commands  Safety/Judgement: Awareness of environment, Fall prevention  Functional Mobility Training:  Transfers:  Sit to Stand: Supervision  Stand to Sit: Supervision  Balance:  Sitting: Intact  Standing: Intact; With support; Without support  Ambulation/Gait Training:  Distance (ft): 750 Feet (ft)  Assistive Device: Walker, rolling  Ambulation - Level of Assistance: Supervision  Gait Abnormalities: Antalgic  Base of Support: Center of gravity altered  Speed/Briana: Pace decreased (<100 feet/min)  Step Length: Right shortened;Left shortened  Stairs:  Number of Stairs Trained: 8  Stairs - Level of Assistance: Supervision  Rail Use: Right Therapeutic Exercises:       EXERCISE   Sets   Reps   Active Active Assist   Passive Self ROM   Comments   Ankle Pumps 1 10  [x] [] [] []    Glut Sets 1 10  [x] [] [] [] Hold for 5 secs   Seated hip abd  1 10 [x] [] [] []    Standing hip abd  1 10 [x] [] [] [] At sink at home                                                                              Pain:  Pain level pre-treatment: 0/10  Pain level post-treatment: 0/10   Indicated some soreness during mobility     Activity Tolerance:   good  Please refer to the flowsheet for vital signs taken during this treatment. After treatment:   [x] Patient left in no apparent distress sitting up in chair  [] Patient left in no apparent distress in bed  [x] Call bell left within reach  [x] Nursing notified  [] Caregiver present  [] Bed alarm activated  [x] Ice applied      COMMUNICATION/EDUCATION:   [x]         Role of Physical Therapy in the acute care setting. [x]         Fall prevention education was provided and the patient/caregiver indicated understanding. [x]         Patient/family have participated as able in working toward goals and plan of care. [x]         Patient/family agree to work toward stated goals and plan of care. []         Patient understands intent and goals of therapy, but is neutral about his/her participation.   []         Patient is unable to participate in stated goals/plan of care: ongoing with therapy staff.  []         Other:        Sarah Esposito PTA   Time Calculation: 44 mins

## 2020-01-14 NOTE — ROUTINE PROCESS
End of Shift Note     Bedside and verbal shift change report given to Payam Pearson RN (On coming nurse) by Doug Anderson RN (Off going nurse).   Report included the following information:      --Procedure Summary     --MAR,     --Recent Results     --Med Rec Status    SBAR Recommendations: D/C    Issues for Provider to address D/C          Activity This Shift     [] Bed Rest Order   [] Refused   [] Dangled    [] TDWB         Ambulating:     [x] Bathroom     [] BSC     [] Room/Hallway      Up in Chair for meals    []Yes [] No   Voiding       [x] Yes  [] No  [place]          [] Yes  [] No  Incontinent [] Yes  [] No    DUE TO VOID POUR        [] Yes [] No  [place]    [] Yes [] No  New Onset [] Yes [] No Straight Cath   []Yes  [] No  Condom Cath  [] Yes [] No  MD Called      [] Yes  [] No   Blood Sugars Managed []Yes [x] No    Bowels Moved [] Yes [] No    Incontinent     [] Yes [] No Passed Gas []Yes [] No    New Onset  []Yes [] No        MD Called []Yes  [] No     CHG Bath Done     Before Surgery     After Surgery      [] Yes  [x] No  [] Yes  [x] No       Drain Removed [] Yes  [] No [x] N/A    Dressing Changed [] Yes   [x] No [] N/A      Nausea/Vomiting [] Yes   [x] No     Ice Packs Changed [x] Yes   [] No  [] N/A    Incentive Spirometer  [x] Yes  [] No      SCD Pumps On     Ankle Pumping  [x] Yes   [] No      [] Yes   [x] No        Telemetry Monitoring [] Yes   [x] No   Rhythm

## 2020-01-14 NOTE — PROGRESS NOTES
Neo Torres rounded on post hip replacement. Patient educated: Activity:  OOB for all meals, walk every hour to prevent blood clots & help with stiffness  Follow hip precautions (no bending over, no crossing legs, no turning toes inward)  Put pillow under whole leg to help with swelling. VTE prophylaxis:   Use SCD pumps except when walking. Ankle pumps 10 times an hour at hospital & home. Take blood thinner medication as ordered by surgeon. Do not skip a dose. Pain Control:  Pain medications side effects discussed. Wean off narcotics ASAP. Use Tylenol ( 3000 mg/24 hours) , ice, distraction, moving, & change position to help with pain. Rest between activity. Raise leg up on pillows. Don't get nauseated. Eat a snack before taking pain medication    Do not get constipated: take stool softener/mild laxative daily while on narcotics. Incentive Spirometry:    Use of incentive spirometer 10 x/hr. Demonstration  2000 ml x 3  Wound Care: Dressing intact and dry. I  Instructed patient on how to manage dressing and/or incision per MD protocol. Make sure dressing is dry and intact at all times. No lotions, powders, creams to surgical leg. .    Diet:   Eat for healing. Protein heals bone/muscle. Drink 8 glasses of water a day. Patient Safety:   Call light & belongings in reach. Call for help when want to walk or get OOB. Educational material given. Patient agreed to continue doing everything at home to prevent complications and have a successful recovery. Patient verbalizes how to manage their wound, use incentive spirometer, do ankle pumping, taking medication to prevent constipation, drinking lots of fluids and eating protein, and the importance of taking to anticoagulant per physician instruction. Patient and brother verbalized understand. Given the opportunity for asking questions.

## 2020-01-14 NOTE — PROGRESS NOTES
OCCUPATIONAL THERAPY EVALUATION/DISCHARGE    Patient: Kurt Torres (60 y.o. male)  Date: 1/13/2020  Primary Diagnosis: Osteoarthritis of left hip, unspecified osteoarthritis type [M16.12]  Hip arthritis [M16.10]  Hip arthritis [M16.10]  Procedure(s) (LRB):  LEFT TOTAL HIP ARTHROPLASTY DIRECT ANTERIOR APPROACH (Left) Day of Surgery   Precautions:   Fall, Total hip, WBAT  PLOF: Pt was independent with basic self care tasks and functional mobility PTA. ASSESSMENT AND RECOMMENDATIONS:  Based on the objective data described below, the patient is able to perform basic self care tasks without assistance using AE (reacher, sock aid, LHS, [place] [address]) given after demonstration/practice while seated. Supervision given for functional standing and transfers. Will defer to PT for mobility training. Hip precautions reviewed and patient verbalized understanding. Patient going to stay with a supportive family member to assist him prn and has all needed DME (shower chair, RW) for home safety. Skilled occupational therapy is not indicated at this time. Discharge Recommendations: None  Further Equipment Recommendations for Discharge: N/A      SUBJECTIVE:   Patient stated I'm feeling pretty good.     OBJECTIVE DATA SUMMARY:     Past Medical History:   Diagnosis Date    Anemia     Arthritis     Bilateral sciatica     Chronic pain     Herniated intervertebral disc of lumbar spine     High cholesterol     Hypertension     Muscle spasm of back      Past Surgical History:   Procedure Laterality Date    COLONOSCOPY N/A 1/10/2020    PREVENTATIVE COLONOSCOPY performed by Rk Serrano MD at [address] [place] [address] ENDOSCOPY    HX ORTHOPAEDIC Left 1999    hand laceration & ligament repair     Barriers to Learning/Limitations: None  Compensate with: visual, verbal, tactile, kinesthetic cues/model    Home Situation:   Home Situation  Home Environment: Apartment  # Steps to Enter: 0  One/Two Story Residence: One story  Living Alone: Yes  Support Systems: Family member(s)  Patient Expects to be Discharged to[de-identified] Private residence  Current DME Used/Available at Home: Luellen Canavan, rolling, Shower chair, Commode, bedside  Tub or Shower Type: Tub/Shower combination(with seat)  [x]     Right hand dominant   []     Left hand dominant    Cognitive/Behavioral Status:  Neurologic State: Alert  Orientation Level: Oriented X4  Cognition: Appropriate decision making; Follows commands  Safety/Judgement: Awareness of environment; Fall prevention    Skin: Intact on UEs  Edema: None noted in UEs    Vision/Perceptual:    Acuity: Within Defined Limits    Corrective Lenses: Glasses    Coordination: BUE  Coordination: Within functional limits  Fine Motor Skills-Upper: Left Intact; Right Intact    Gross Motor Skills-Upper: Left Intact; Right Intact    Balance:  Sitting: Intact  Standing: Intact; With support    Strength: BUE  Strength: Within functional limits    Tone & Sensation: BUE  Tone: Normal  Sensation: Intact    Range of Motion: BUE  AROM: Within functional limits    Functional Mobility and Transfers for ADLs:  Bed Mobility:  Supine to Sit: Stand-by assistance  Scooting: Stand-by assistance  Transfers:  Sit to Stand: Stand-by assistance  Stand to Sit: Stand-by assistance   Toilet Transfer : Supervision    ADL Assessment:  Feeding: Independent    Oral Facial Hygiene/Grooming: Independent    Bathing: Supervision    Upper Body Dressing: Independent    Lower Body Dressing: Supervision    Toileting: Supervision    ADL Intervention:  Patient practiced LB dressing with use of AE given (reacher, sock aid) after demonstration. No assist needed after practice. Patient also given a long handled sponge and long handled shoe horn after demonstration. Cognitive Retraining  Safety/Judgement: Awareness of environment; Fall prevention    Pain:  Pain level pre-treatment: 0/10   Pain level post-treatment: 0/10   Pain Intervention(s): Medication (see MAR)  Response to intervention: Nurse notified, See doc flow    Activity Tolerance:   Good  Please refer to the flowsheet for vital signs taken during this treatment. After treatment:   [x]  Patient left in no apparent distress sitting up in chair  []  Patient left in no apparent distress in bed  [x]  Call bell left within reach  [x]  Nursing notified  [x]  Caregiver present  []  Bed alarm activated    COMMUNICATION/EDUCATION:   [x]      Role of Occupational Therapy in the acute care setting  [x]      Home safety education was provided and the patient/caregiver indicated understanding. [x]      Patient/family have participated as able and agree with findings and recommendations. []      Patient is unable to participate in plan of care at this time. Thank you for this referral.  Joann Parry MS OTR/L   Time Calculation: 24 mins      Eval Complexity: History: LOW Complexity : Brief history review ; Examination: LOW Complexity : 1-3 performance deficits relating to physical, cognitive , or psychosocial skils that result in activity limitations and / or participation restrictions ;    Decision Making:LOW Complexity : No comorbidities that affect functional and no verbal or physical assistance needed to complete eval tasks

## 2020-01-14 NOTE — PROGRESS NOTES
Primary Care Progress Note    Patient: Ginette Clancy MRN: [id number]  CSN: [id number]    YOB: 1960  Age: 63 y.o. Sex: male    DOA: 1/13/2020 LOS:  LOS: 1 day            S:  Patient has no acute complaints today. Reports pain is well controlled. BP still higher than goal, is on home medications and improved control from day prior, will monitor on amlodipine. Reports passing flatus but no bm yet. Does not feel constipationed, no abdominal pain or bloating. HPI:     Ginette Clancy is a 63 y.o.  male who has been seen for  Medical  management agfter Lt total hip replacemnet  Pt has h/o hypertension and hyperlipidemia chronic back pain . Pt denied any chest pain SOB after surgery   Pain under control with current pain med . Urine drug screen positive THC    Recent Results (from the past 24 hour(s))   IRON PROFILE    Collection Time: 01/13/20  1:00 PM   Result Value Ref Range    Iron 48 (L) 50 - 175 ug/dL    TIBC 252 250 - 450 ug/dL    Iron % saturation 19 %   CBC WITH AUTOMATED DIFF    Collection Time: 01/14/20  5:15 AM   Result Value Ref Range    WBC 15.9 (H) 4.6 - 13.2 K/uL    RBC 4.65 (L) 4.70 - 5.50 M/uL    HGB 10.4 (L) 13.0 - 16.0 g/dL    HCT 31.9 (L) 36.0 - 48.0 %    MCV 68.6 (L) 74.0 - 97.0 FL    MCH 22.4 (L) 24.0 - 34.0 PG    MCHC 32.6 31.0 - 37.0 g/dL    RDW 14.5 11.6 - 14.5 %    PLATELET 403 610 - 672 K/uL    MPV 9.4 9.2 - 11.8 FL    NEUTROPHILS 77 (H) 42 - 75 %    LYMPHOCYTES 12 (L) 20 - 51 %    MONOCYTES 11 (H) 2 - 9 %    EOSINOPHILS 0 0 - 5 %    BASOPHILS 0 0 - 3 %    ABS. NEUTROPHILS 12.3 (H) 1.8 - 8.0 K/UL    ABS. LYMPHOCYTES 1.9 0.8 - 3.5 K/UL    ABS. MONOCYTES 1.7 (H) 0 - 1.0 K/UL    ABS. EOSINOPHILS 0.0 0.0 - 0.4 K/UL    ABS.  BASOPHILS 0.0 0.0 - 0.06 K/UL    DF MANUAL      PLATELET COMMENTS ADEQUATE PLATELETS      RBC COMMENTS POLYCHROMASIA  1+        RBC COMMENTS TARGET CELLS  1+        RBC COMMENTS OVALOCYTES  1+       METABOLIC PANEL, COMPREHENSIVE Collection Time: 20  5:15 AM   Result Value Ref Range    Sodium 138 136 - 145 mmol/L    Potassium 4.0 3.5 - 5.5 mmol/L    Chloride 105 100 - 111 mmol/L    CO2 27 21 - 32 mmol/L    Anion gap 6 3.0 - 18 mmol/L    Glucose 126 (H) 74 - 99 mg/dL    BUN 17 7.0 - 18 MG/DL    Creatinine 0.96 0.6 - 1.3 MG/DL    BUN/Creatinine ratio 18 12 - 20      GFR est AA >60 >60 ml/min/1.73m2    GFR est non-AA >60 >60 ml/min/1.73m2    Calcium 8.7 8.5 - 10.1 MG/DL    Bilirubin, total 0.6 0.2 - 1.0 MG/DL    ALT (SGPT) 16 16 - 61 U/L    AST (SGOT) 22 10 - 38 U/L    Alk.  phosphatase 48 45 - 117 U/L    Protein, total 6.7 6.4 - 8.2 g/dL    Albumin 3.2 (L) 3.4 - 5.0 g/dL    Globulin 3.5 2.0 - 4.0 g/dL    A-G Ratio 0.9 0.8 - 1.7     MAGNESIUM    Collection Time: 20  5:15 AM   Result Value Ref Range    Magnesium 2.4 1.6 - 2.6 mg/dL   VITAMIN B12 & FOLATE    Collection Time: 20  5:15 AM   Result Value Ref Range    Vitamin B12 419 211 - 911 pg/mL    Folate 13.1 3.10 - 17.50 ng/mL       Past Medical History:   Diagnosis Date    Anemia     Arthritis     Bilateral sciatica     Chronic pain     Herniated intervertebral disc of lumbar spine     High cholesterol     Hypertension     Muscle spasm of back        Past Surgical History:   Procedure Laterality Date    COLONOSCOPY N/A 1/10/2020    PREVENTATIVE COLON[facility]COPY [facility] Tiago Lion [facility] at [facility] ENDOSCOPY    HX ORTHOPAEDIC Left 1999    hand laceration & ligament repair       Family History   Problem Relation Age of Onset    Hypertension Mother     Cancer Mother     Cancer Father     Diabetes Father        Social History     Socioeconomic History    Marital status: SINGLE     Spouse name: Not on file    Number of children: Not on file    Years of education: Not on file    Highest education level: Not on file   Tobacco Use    Smoking status: Former Smoker     Types: Cigarettes     Last attempt to quit: 2016     Years since quittin.0    Smokeless tobacco: Never Used   Substance and Sexual Activity    Alcohol use: No       Prior to Admission medications    Medication Sig Start Date End Date Taking? Authorizing Provider   oxyCODONE-acetaminophen (PERCOCET) 7.5-325 mg per tablet Take 1-2 Tabs by mouth every four (4) hours as needed for Pain for up to 7 days. Max Daily Amount: 12 Tabs. 1/13/20 1/20/20 Yes Cedric Lock PA-C   docusate sodium (COLACE) 100 mg capsule Take 1 Cap by mouth two (2) times a day for 90 days. 1/13/20 4/12/20 Yes Cedric Lock PA-C   celecoxib (CELEBREX) 200 mg capsule Take 1 Cap by mouth two (2) times a day for 90 days. 1/13/20 4/12/20 Yes Cedric Lock PA-C   aspirin (ASPIRIN) 325 mg tablet Take 1 Tab by mouth two (2) times a day. 1/13/20  Yes Cedric Lock PA-C   cephALEXin (KEFLEX) 500 mg capsule Take 1 Cap by mouth four (4) times daily. 1/13/20  Yes Cedric Lock PA-C   atorvastatin (LIPITOR) 10 mg tablet Take 10 mg by mouth daily. Yes Provider, Historical   amLODIPine (NORVASC) 10 mg tablet Take 10 mg by mouth daily. Yes Provider, Historical   gabapentin (NEURONTIN) 100 mg capsule Take  by mouth nightly. Yes Provider, Historical   cyclobenzaprine (FLEXERIL) 10 mg tablet Take  by mouth three (3) times daily as needed for Muscle Spasm(s). Yes Provider, Historical   meloxicam (MOBIC) 15 mg tablet Take 1 Tab by mouth daily. 9/11/19  Yes Deedee Mancera PA       No Known Allergies    Review of Systems  GENERAL: Patient alert, awake and oriented times 3, able to communicate full sentences and not in distress. HEENT: No change in vision, no earache, tinnitus, sore throat or sinus congestion. NECK: No pain or stiffness. CARDIOVASCULAR: No chest pain or pressure. No palpitations. PULMONARY: No shortness of breath, cough or wheeze. GASTROINTESTINAL: No abdominal pain, nausea, vomiting or diarrhea, melena or       bright red blood per rectum.  Didn't have BM yet, passing flatus  GENITOURINARY: No urinary frequency, urgency, hesitancy or dysuria. Was able to pass urine   MUSCULOSKELETAL:Lt hip surgery    DERMATOLOGIC: No rash, no itching, no lesions. ENDOCRINE: No polyuria, polydipsia, no heat or cold intolerance. No recent change in    weight. HEMATOLOGICAL: H/o  anemia or easy bruising or bleeding. NEUROLOGIC: No headache, seizures, numbness, tingling or weakness. PSYCHIATRIC: No depression, anxiety, mood disorder, no loss of interest in normal       activity or change in sleep pattern. Physical Exam:      Visit Vitals  /79 (BP 1 Location: Right arm, BP Patient Position: At rest)   Pulse 69   Temp 97.8 °F (36.6 °C)   Resp 16   Ht 6' 1\" (1.854 m)   Wt 80.3 kg (177 lb)   SpO2 99%   BMI 23.35 kg/m²    O2 Flow Rate (L/min): 3 l/min O2 Device: Room air    Temp (24hrs), Av °F (36.7 °C), Min:97.4 °F (36.3 °C), Max:98.3 °F (36.8 °C)     07 -  1900  In: 480 [P.O.:480]  Out: 250 [Urine:250]   1901 -  0700  In: 3160 [P.O.:960; I.V.:2200]  Out: 3825 [OJFIV:5288]    General:  Alert, cooperative, no distress, appears stated age. Head:  Normocephalic, without obvious abnormality, atraumatic. Eyes:  Conjunctivae/corneas clear. PERRL, EOMs intact. Nose: Nares normal. No drainage or sinus tenderness. Throat: Lips, mucosa, and tongue  dry   Neck: Supple, symmetrical, trachea midline, no adenopathy, thyroid: no enlargement/tenderness/nodules, no carotid bruit and no JVD. Back:   ROM normal. No CVA tenderness. Lungs:   Clear to auscultation bilaterally. Chest wall:  No tenderness or deformity. Heart:  Regular rate and rhythm, S1, S2 normal, no murmur, click, rub or gallop. Abdomen: Soft, non-tender. Bowel sounds normal. No masses,  No organomegaly. Extremities: S/p total Lt hip replacement dressing clean pain under control   Pulses: 2+ and symmetric all extremities. Skin: Skin color, texture, turgor normal. No rashes or lesions   Neurologic: CNII-XII intact.  No focal motor or sensory deficit. Labs Reviewed:  CMP:   Lab Results   Component Value Date/Time     01/14/2020 05:15 AM    K 4.0 01/14/2020 05:15 AM     01/14/2020 05:15 AM    CO2 27 01/14/2020 05:15 AM    AGAP 6 01/14/2020 05:15 AM     (H) 01/14/2020 05:15 AM    BUN 17 01/14/2020 05:15 AM    CREA 0.96 01/14/2020 05:15 AM    GFRAA >60 01/14/2020 05:15 AM    GFRNA >60 01/14/2020 05:15 AM    CA 8.7 01/14/2020 05:15 AM    MG 2.4 01/14/2020 05:15 AM    ALB 3.2 (L) 01/14/2020 05:15 AM    TP 6.7 01/14/2020 05:15 AM    GLOB 3.5 01/14/2020 05:15 AM    AGRAT 0.9 01/14/2020 05:15 AM    SGOT 22 01/14/2020 05:15 AM    ALT 16 01/14/2020 05:15 AM     CBC:   Lab Results   Component Value Date/Time    WBC 15.9 (H) 01/14/2020 05:15 AM    HGB 10.4 (L) 01/14/2020 05:15 AM    HCT 31.9 (L) 01/14/2020 05:15 AM     01/14/2020 05:15 AM       Procedures/imaging: see electronic medical records for all procedures/Xrays and details which were not copied into this note but were reviewed prior to creation of Plan        Assessment/Plan     Active Problems:    Hip arthritis (1/13/2020)        Plan    S/P Lt total hip replacement  DVT prophylaxis per ortho  Pt and ot  Pt will stay with his brother and would like to have home health at [facility]   Tylenol, celebrex and flexeril   Oxycodone prn  Monitor BM, continue pericolace bid, add miralax    Hypertension   Continue Norvasc 10 mg daily  Monitor Bp    Hyperlipidemia  lipitor 10 mg qhs  LFTs stable    Anemia, mild iron deficiency  Continue ferrous sulfate   Normal b12 and folate  Monitor cbc   Add Pepcid 20 mg BID     DVT prophylaxis per ortho   Pt and ot   Case management set up home health at [facility], pt will stay with brother. Stable from medical standpoint for discharge, patient will follow up with our office as outpatient.       Jerry Wilcox MD  1/14/2020 10:43am    .

## 2020-01-14 NOTE — PROGRESS NOTES
Patient discharging to [facility], [address] [facility], [address]. Brother, Mari Saunders, [phone] is taking him to his home.    [phone] to reach patient.      Patient already has walker and shower chair, asking to take a urinal home.      Already set up with [facility] health in [address]  Case Management  [phone]

## 2020-01-14 NOTE — PROGRESS NOTES
Discharge:    Patient discharging to [facility], [place], [address]. Brother, Swetha Hawkins, [phone] is taking him to his home. [phone] to reach patient.      Patient already has walker and shower chair, asking to take a urinal home.      Already set up with [facility] home health in [facility].          CINDY Edwards  Case Management  [phone]

## 2020-01-14 NOTE — OP NOTES
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 PROCEDURE PERFORMED:  Direct anterior left hip replacement using the Emmanuel Accolade II system with a size 58 Trident II Tritanium acetabular shell, 36 neutral liner, a size 7, 127 high offset femoral component, and a 36 plus 2.5 ceramic femoral head. SURGEON:  Kuldip Deras. Nehemias Quintero MD    ASSISTANT:  Terese Li, first assistant. ANESTHESIA:  Dr. Sudarshan Fernandez, general.    COMPLICATIONS:  None. SPECIMENS REMOVED:  Femoral head. IMPLANTS:  As above mentioned. ESTIMATED BLOOD LOSS:  275. SECOND ASSISTANT:  Venora Lesch. Terese Li was the first assistant who assisted with all phases of the surgery commencing with patient positioning, patient prep, patient drape, leg positioning during the surgery, retracting, assisting with the surgery itself, closure, dressing placement, and transfer. PROCEDURE:  After the anesthetic was successfully induced, it was confirmed the patient did receive his antibiotics, leg lengths determined, he was fairly short on preop AP/pelvis and I would refer back to that. Standard prep and drape, and a time-out performed, antibiotics confirmed given, standard incision, protecting the neurovascular bundle, good hemostasis. We found the leash of vessels, tied them, used the Aquamantys. Divided the vastus lateralis fascia proximally and identified the lateral margin of rectus, resected the pericapsular fat, performed our capsulotomy, tagged them, and re-positioned our retractors, three standard releases.   With the aid of the image intensifier, confirmed the level of osteotomy, protected the soft tissue structures. Bone quality was quite soft surprisingly for a 63year-old, and femoral head extracted. Posterior capsule released mildly. Confirmed the level of osteotomy, instrumented around the acetabular region, protected the neurovascular structures. We removed the  from the fovea, and gave us excellent exposure, protected the neurovascular structures, medialized appropriately with the aid of the image intensifier. At appropriate inclination and anteversion, reamed up to accommodate the cup. Again, the bone quality was fairly soft, trialed this. I was very pleased with the 58. I implanted the definitive shell at the appropriate inclination and anteversion, and augmented with a screw, very solidly fixed. Removed some posterior and inferior osteophyte, little bit anterior as well. Aquamantys and Exparel cocktail. Definitive liner, made sure it was fully seated and protected with a Ray-Reymundo gauze. With the traction off and the leg in the 3 o'clock down position, standard release, dropped the leg all the way down, gave us good proximal exposure, lateralized using the [place]ell and chili pepper, and then broached our way up. Canal finder with the C-arm confirmed we are down the middle, and then broached our way up to accommodate the size 7. Again, it was a somewhat soft bone. We were very snug with the 7, reduced the hip, and the +2.5 gave anatomic restoration of offset and leg lengths. I was very pleased with this. Very good tight fit with the stem as well. I dislocated the hip, implanted definitive component, and re-trialed and happiest with the +2.5, cleaned the trunnion, impacted on again reducing the hip. Combined anteversion was excellent, it was very stable. Offset and leg lengths were anatomically restored. Routine closure. At the end of the case, instrument, sponge, and needle counts were correct.   No complications. The patient tolerated the procedure well, and blood loss 275. Excellent outcome.       Oswaldo Pereira MD [id number]  D:  01/13/2020 9:53  T:  01/13/2020 21:16  JOB #:  [id number]

## 2020-01-14 NOTE — DISCHARGE INSTRUCTIONS
DISCHARGE SUMMARY from Nurse    PATIENT INSTRUCTIONS:    After general anesthesia or intravenous sedation, for 24 hours or while taking prescription Narcotics:  · Limit your activities  · Do not drive and operate hazardous machinery  · Do not make important personal or business decisions  · Do  not drink alcoholic beverages  · If you have not urinated within 8 hours after discharge, please contact your surgeon on call. Report the following to your surgeon:  · Excessive pain, swelling, redness or odor of or around the surgical area  · Temperature over 100.5  · Nausea and vomiting lasting longer than 4 hours or if unable to take medications  · Any signs of decreased circulation or nerve impairment to extremity: change in color, persistent  numbness, tingling, coldness or increase pain  · Any questions    What to do at Home:  Recommended activity: Activity as tolerated, follow instructions given by the physical and occupational therapist.    If you experience any of the following symptoms ,see hip replacement discharge instructions provided, please follow up with Jeannie Condon. *  Please give a list of your current medications to your Primary Care Provider. *  Please update this list whenever your medications are discontinued, doses are      changed, or new medications (including over-the-counter products) are added. *  Please carry medication information at all times in case of emergency situations. These are general instructions for a healthy lifestyle:    No smoking/ No tobacco products/ Avoid exposure to second hand smoke  Surgeon General's Warning:  Quitting smoking now greatly reduces serious risk to your health.     Obesity, smoking, and sedentary lifestyle greatly increases your risk for illness    A healthy diet, regular physical exercise & weight monitoring are important for maintaining a healthy lifestyle    You may be retaining fluid if you have a history of heart failure or if you experience any of the following symptoms:  Weight gain of 3 pounds or more overnight or 5 pounds in a week, increased swelling in our hands or feet or shortness of breath while lying flat in bed. Please call your doctor as soon as you notice any of these symptoms; do not wait until your next office visit. [employer] Activation    Thank you for requesting access to TORIA. Please follow the instructions below to securely access and download your online medical record. TORIA allows you to send messages to your doctor, view your test results, renew your prescriptions, schedule appointments, and more. How Do I Sign Up? 1. In your internet browser, go to [email or web address]. 2. Click on the First Time User? Click Here link in the Sign In box. You will see the New Member Sign Up page. 3. Enter your TORIA Access Code exactly as it appears below. You will not need to use this code after youve completed the sign-up process. If you do not sign up before the expiration date, you must request a new code. TORIA Access Code: [email or web address]  Expires: 2/10/2020  9:38 AM (This is the date your TORIA access code will )    4. Enter the last four digits of your Social Security Number (xxxx) and Date of Birth (mm/dd/yyyy) as indicated and click Submit. You will be taken to the next sign-up page. 5. Create a TORIA ID. This will be your TORIA login ID and cannot be changed, so think of one that is secure and easy to remember. 6. Create a TORIA password. You can change your password at any time. 7. Enter your Password Reset Question and Answer. This can be used at a later time if you forget your password. 8. Enter your e-mail address. You will receive e-mail notification when new information is available in [address] 9. Click Sign Up. You can now view and download portions of your medical record.   10. Click the Download Summary menu link to download a portable copy of your medical information. Additional Information    If you have questions, please visit the Frequently Asked Questions section of the [employer] website at [email or web address] Remember, [employer] is NOT to be used for urgent needs. For medical emergencies, dial 911. Patient armband removed and shredded. The discharge information has been reviewed with the patient. The patient verbalized understanding. Discharge medications reviewed with the patient and appropriate educational materials and side effects teaching were provided.   ___________________________________________________________________________________________________________________________________

## 2020-01-15 ENCOUNTER — TELEPHONE (OUTPATIENT)
Dept: ORTHOPEDIC SURGERY | Age: 60
End: 2020-01-15

## 2020-01-15 ENCOUNTER — HOME CARE VISIT (OUTPATIENT)
Dept: SCHEDULING | Facility: HOME HEALTH | Age: 60
End: 2020-01-15
Payer: MEDICAID

## 2020-01-15 ENCOUNTER — APPOINTMENT (OUTPATIENT)
Dept: PHYSICAL THERAPY | Age: 60
End: 2020-01-15

## 2020-01-15 VITALS
TEMPERATURE: 99.3 F | HEART RATE: 80 BPM | OXYGEN SATURATION: 97 % | SYSTOLIC BLOOD PRESSURE: 140 MMHG | DIASTOLIC BLOOD PRESSURE: 82 MMHG

## 2020-01-15 VITALS
TEMPERATURE: 98 F | HEART RATE: 80 BPM | DIASTOLIC BLOOD PRESSURE: 82 MMHG | RESPIRATION RATE: 17 BRPM | SYSTOLIC BLOOD PRESSURE: 140 MMHG | OXYGEN SATURATION: 98 %

## 2020-01-15 PROCEDURE — G0299 HHS/HOSPICE OF RN EA 15 MIN: HCPCS

## 2020-01-15 PROCEDURE — G0151 HHCP-SERV OF PT,EA 15 MIN: HCPCS

## 2020-01-15 PROCEDURE — 400013 HH SOC

## 2020-01-15 PROCEDURE — A6213 FOAM DRG >16<=48 SQ IN W/BDR: HCPCS

## 2020-01-15 NOTE — TELEPHONE ENCOUNTER
Patient called stating he is trying to fill his prescription for Percocet at [facility] in [place] where he is participating in physical therapy. [facility] states they need a confirmation call from the physician before they can fill. Please advise.     [phone] [facility] in [address] [place] [address] Patient

## 2020-01-15 NOTE — TELEPHONE ENCOUNTER
Called and spoke with [facility] pharmacist, she stated the patient took the Rx back so unfortunately they are unable to do anything at this time. Called and spoke with patient and notified him of this. He states his brother is on his way over there now to drop the Rx back off and he stated he will have the pharmacy call our office once Rx is in their possession. Per SUNITHA Diego ok to fill Rx as patient is s/p left NEREIDA 1/13/2020. If the pharmacy needs to speak with SUNITHA Diego please route message directly to him, as they are in sx this morning.

## 2020-01-15 NOTE — TELEPHONE ENCOUNTER
Patient called to check status of med request below, he's having therapy today and needs medication. He was advised we are currently working on this.

## 2020-01-15 NOTE — TELEPHONE ENCOUNTER
Sammie from home health called needing clarification on the order done by [facility] that reads to have physical therapy daily for 14 days, which she states is is a lot, more than usual.  She is wondering if that's correct. She also states the order is not very clear on how many times she is supposed to see the patient. The order currently reads. ... Daily x 14d  Total hip protocol, wbat  No slr x 6 weeks  Nursing  Aspirin therapy  Honeycomb dressing pod 7 and prn    She is requesting a call back for clarification.     Sammie [phone]

## 2020-01-15 NOTE — TELEPHONE ENCOUNTER
Patient called stating that the prescription has been returned to the pharmacy but the pharmacy refused to call the office.  The patient asked if our office can call the pharmacy at [phone] and let him know when this has been completed at [phone]

## 2020-01-16 ENCOUNTER — HOME CARE VISIT (OUTPATIENT)
Dept: SCHEDULING | Facility: HOME HEALTH | Age: 60
End: 2020-01-16
Payer: MEDICAID

## 2020-01-16 VITALS
OXYGEN SATURATION: 98 % | SYSTOLIC BLOOD PRESSURE: 140 MMHG | RESPIRATION RATE: 17 BRPM | TEMPERATURE: 98 F | HEART RATE: 75 BPM | DIASTOLIC BLOOD PRESSURE: 70 MMHG

## 2020-01-16 PROCEDURE — G0151 HHCP-SERV OF PT,EA 15 MIN: HCPCS

## 2020-01-16 NOTE — TELEPHONE ENCOUNTER
Called to speak with [facility] pharmacy to give confirmation for Rx. They stated Rx was filled yesterday. Called patient to verify and he stated he did receive Rx. No further action required at this time.

## 2020-01-17 ENCOUNTER — APPOINTMENT (OUTPATIENT)
Dept: PHYSICAL THERAPY | Age: 60
End: 2020-01-17

## 2020-01-17 ENCOUNTER — HOME CARE VISIT (OUTPATIENT)
Dept: SCHEDULING | Facility: HOME HEALTH | Age: 60
End: 2020-01-17
Payer: MEDICAID

## 2020-01-17 VITALS
SYSTOLIC BLOOD PRESSURE: 136 MMHG | HEART RATE: 58 BPM | OXYGEN SATURATION: 93 % | TEMPERATURE: 98.9 F | DIASTOLIC BLOOD PRESSURE: 90 MMHG

## 2020-01-17 VITALS
OXYGEN SATURATION: 98 % | SYSTOLIC BLOOD PRESSURE: 139 MMHG | RESPIRATION RATE: 17 BRPM | HEART RATE: 68 BPM | DIASTOLIC BLOOD PRESSURE: 73 MMHG | TEMPERATURE: 98 F

## 2020-01-17 PROCEDURE — G0299 HHS/HOSPICE OF RN EA 15 MIN: HCPCS

## 2020-01-17 PROCEDURE — G0151 HHCP-SERV OF PT,EA 15 MIN: HCPCS

## 2020-01-18 ENCOUNTER — HOME CARE VISIT (OUTPATIENT)
Dept: HOME HEALTH SERVICES | Facility: HOME HEALTH | Age: 60
End: 2020-01-18
Payer: MEDICAID

## 2020-01-19 ENCOUNTER — HOME CARE VISIT (OUTPATIENT)
Dept: HOME HEALTH SERVICES | Facility: HOME HEALTH | Age: 60
End: 2020-01-19
Payer: MEDICAID

## 2020-01-20 ENCOUNTER — HOME CARE VISIT (OUTPATIENT)
Dept: SCHEDULING | Facility: HOME HEALTH | Age: 60
End: 2020-01-20
Payer: MEDICAID

## 2020-01-20 VITALS
SYSTOLIC BLOOD PRESSURE: 130 MMHG | RESPIRATION RATE: 17 BRPM | TEMPERATURE: 98 F | DIASTOLIC BLOOD PRESSURE: 70 MMHG | HEART RATE: 70 BPM | OXYGEN SATURATION: 98 %

## 2020-01-20 VITALS
SYSTOLIC BLOOD PRESSURE: 130 MMHG | TEMPERATURE: 99.3 F | HEART RATE: 77 BPM | DIASTOLIC BLOOD PRESSURE: 86 MMHG | OXYGEN SATURATION: 100 %

## 2020-01-20 PROCEDURE — G0157 HHC PT ASSISTANT EA 15: HCPCS

## 2020-01-20 PROCEDURE — G0299 HHS/HOSPICE OF RN EA 15 MIN: HCPCS

## 2020-01-21 ENCOUNTER — HOME CARE VISIT (OUTPATIENT)
Dept: HOME HEALTH SERVICES | Facility: HOME HEALTH | Age: 60
End: 2020-01-21
Payer: MEDICAID

## 2020-01-22 ENCOUNTER — HOME CARE VISIT (OUTPATIENT)
Dept: SCHEDULING | Facility: HOME HEALTH | Age: 60
End: 2020-01-22
Payer: MEDICAID

## 2020-01-22 VITALS
OXYGEN SATURATION: 98 % | TEMPERATURE: 98 F | DIASTOLIC BLOOD PRESSURE: 81 MMHG | RESPIRATION RATE: 17 BRPM | HEART RATE: 72 BPM | SYSTOLIC BLOOD PRESSURE: 140 MMHG

## 2020-01-22 PROCEDURE — G0151 HHCP-SERV OF PT,EA 15 MIN: HCPCS

## 2020-01-23 ENCOUNTER — APPOINTMENT (OUTPATIENT)
Dept: PHYSICAL THERAPY | Age: 60
End: 2020-01-23

## 2020-01-23 ENCOUNTER — HOME CARE VISIT (OUTPATIENT)
Dept: HOME HEALTH SERVICES | Facility: HOME HEALTH | Age: 60
End: 2020-01-23
Payer: MEDICAID

## 2020-01-24 ENCOUNTER — HOME CARE VISIT (OUTPATIENT)
Dept: SCHEDULING | Facility: HOME HEALTH | Age: 60
End: 2020-01-24
Payer: MEDICAID

## 2020-01-24 ENCOUNTER — APPOINTMENT (OUTPATIENT)
Dept: PHYSICAL THERAPY | Age: 60
End: 2020-01-24

## 2020-01-24 VITALS
OXYGEN SATURATION: 98 % | TEMPERATURE: 98 F | SYSTOLIC BLOOD PRESSURE: 135 MMHG | RESPIRATION RATE: 17 BRPM | DIASTOLIC BLOOD PRESSURE: 79 MMHG | HEART RATE: 70 BPM

## 2020-01-24 PROCEDURE — G0151 HHCP-SERV OF PT,EA 15 MIN: HCPCS

## 2020-01-27 ENCOUNTER — APPOINTMENT (OUTPATIENT)
Dept: PHYSICAL THERAPY | Age: 60
End: 2020-01-27

## 2020-01-29 ENCOUNTER — APPOINTMENT (OUTPATIENT)
Dept: PHYSICAL THERAPY | Age: 60
End: 2020-01-29

## 2020-01-30 ENCOUNTER — OFFICE VISIT (OUTPATIENT)
Dept: ORTHOPEDIC SURGERY | Age: 60
End: 2020-01-30

## 2020-01-30 VITALS
BODY MASS INDEX: 25.76 KG/M2 | WEIGHT: 194.4 LBS | TEMPERATURE: 97.4 F | DIASTOLIC BLOOD PRESSURE: 78 MMHG | RESPIRATION RATE: 16 BRPM | SYSTOLIC BLOOD PRESSURE: 131 MMHG | HEIGHT: 73 IN | OXYGEN SATURATION: 97 % | HEART RATE: 69 BPM

## 2020-01-30 DIAGNOSIS — M25.552 LEFT HIP PAIN: ICD-10-CM

## 2020-01-30 DIAGNOSIS — Z96.642 HISTORY OF LEFT HIP REPLACEMENT: Primary | ICD-10-CM

## 2020-01-30 NOTE — PROGRESS NOTES
[address] [place] [address]  [phone]           Patient: Farooq Brink                MRN: [id number]       SSN: [id number]  YOB: 1960        AGE: 63 y.o. SEX: male  Body mass index is 25.65 kg/m². PCP: Shruthi Su MD  01/30/20      This office note has been dictated. REVIEW OF SYSTEMS:  Constitutional: Negative for fever, chills, weight loss and malaise/fatigue. HENT: Negative. Eyes: Negative. Respiratory: Negative. Cardiovascular: Negative. Gastrointestinal: No bowel incontinence or constipation. Genitourinary: No bladder incontinence or saddle anesthesia. Skin: Negative. Neurological: Negative. Endo/Heme/Allergies: Negative. Psychiatric/Behavioral: Negative. Musculoskeletal: As per HPI above. Past Medical History:   Diagnosis Date    Anemia     Arthritis     Bilateral sciatica     Chronic pain     Herniated intervertebral disc of lumbar spine     High cholesterol     Hypertension     Muscle spasm of back          Current Outpatient Medications:     docusate sodium (COLACE) 100 mg capsule, Take 1 Cap by mouth two (2) times a day for 90 days. , Disp: 60 Cap, Rfl: 2    celecoxib (CELEBREX) 200 mg capsule, Take 1 Cap by mouth two (2) times a day for 90 days. , Disp: 60 Cap, Rfl: 2    aspirin (ASPIRIN) 325 mg tablet, Take 1 Tab by mouth two (2) times a day., Disp: 60 Tab, Rfl: 0    cephALEXin (KEFLEX) 500 mg capsule, Take 1 Cap by mouth four (4) times daily. , Disp: 8 Cap, Rfl: 0    atorvastatin (LIPITOR) 10 mg tablet, Take 10 mg by mouth daily. , Disp: , Rfl:     amLODIPine (NORVASC) 10 mg tablet, Take 10 mg by mouth daily. , Disp: , Rfl:     gabapentin (NEURONTIN) 100 mg capsule, Take  by mouth nightly., Disp: , Rfl:     cyclobenzaprine (FLEXERIL) 10 mg tablet, Take  by mouth three (3) times daily as needed for Muscle Spasm(s). , Disp: , Rfl:     No Known Allergies    Social History     Socioeconomic History    Marital status: SINGLE     Spouse name: Not on file    Number of children: Not on file    Years of education: Not on file    Highest education level: Not on file   Occupational History    Not on file   Social Needs    Financial resource strain: Not on file    Food insecurity:     Worry: Not on file     Inability: Not on file    Transportation needs:     Medical: Not on file     Non-medical: Not on file   Tobacco Use    Smoking status: Former Smoker     Types: Cigarettes     Last attempt to quit: 2016     Years since quittin.0    Smokeless tobacco: Never Used   Substance and Sexual Activity    Alcohol use: No    Drug use: Not on file    Sexual activity: Not on file   Lifestyle    Physical activity:     Days per week: Not on file     Minutes per session: Not on file    Stress: Not on file   Relationships    Social connections:     Talks on phone: Not on file     Gets together: Not on file     Attends Gnosticist service: Not on file     Active member of club or organization: Not on file     Attends meetings of clubs or organizations: Not on file     Relationship status: Not on file    Intimate partner violence:     Fear of current or ex partner: Not on file     Emotionally abused: Not on file     Physically abused: Not on file     Forced sexual activity: Not on file   Other Topics Concern    Not on file   Social History Narrative    Not on file       Past Surgical History:   Procedure Laterality Date    COLONOSCOPY N/A 1/10/2020    PREVENTATIVE COLONOSCOPY performed by Kartik Sandoval MD at [address] [place] [address] HX ORTHOPAEDIC Left     hand laceration & ligament repair           We did see Mr. Bustamante Reasons for followup with regards to his left direct anterior hip replacement. The patient is now 17 days status post surgery, and he is doing quite well. He has finished up his [facility] physical therapy.   He has had no troubles with the wound, and no fevers, chills, systemic changes, or injuries to report, and no chest pain or shortness of breath. He denies the need for analgesics. PHYSICAL EXAMINATION:  In general, the patient is alert and oriented x 3 in no acute distress. The patient is well-developed, well-nourished, with a normal affect. The patient is afebrile. Examination of the left hip reveals the skin is intact. The surgical wounds are healed up nicely. There is no ecchymosis, no warmth, and no signs of infection or cellulitis present. He has no pain with rotation of the hip. Neurovascular status is intact to the lower extremity. Leg lengths look perfect. There is no calf tenderness. There is a negative Presley's sign. There are no signs for DVT present. ASSESSMENT:  Status post left direct anterior hip replacement. PLAN:  At this point, the patient has done very well with his hip replacement. He will be set up with outpatient physical therapy again. He denies the need for analgesics. He will see us back in about three weeks' time for evaluation and plain x-ray of the left hip. He will call with any questions or concerns that shall arise.                   JR Yaakov FALLON, KANE, ATC

## 2020-01-30 NOTE — PROGRESS NOTES
1. Have you been to the ER, urgent care clinic since your last visit? Hospitalized since your last visit? No    2. Have you seen or consulted any other health care providers outside of the [address] [place] [address] since your last visit? Include any pap smears or colon screening.  No

## 2020-02-06 NOTE — PROGRESS NOTES
[facility] - Jeana Cadet  [address] [facility]  [phone] fax    Physical Therapy Discharge Summary    Patient name: Norberto Torres Start of Care: 11/19/2019   Referral source: Ernie Elliott MD RJS: [id number]               Medical Diagnosis: Degeneration of cervical intervertebral disc [M50.30]  Other cervical disc degeneration, unspecified cervical region [M50.30]  Payor: [facility] MEDICAID / Plan: [address] [place] [address] CCCP / Product Type: Managed Care Medicaid /  Onset Date: aggravated about several weeks ago               Treatment Diagnosis: Neck pain with radicular symptoms with Right UE   Prior Hospitalization: see medical history Provider#: [id number]   Medications: Verified on Patient summary List    Comorbidities: depression, arthritis, weight change of more than 10 lbs, HTN, visual impaired   Prior Level of Function: right handed, heavy lifting/construction work    Visits from [employer] of Care: 9    Missed Visits: 3    Reporting Period : 12- to 12-    Summary of Care:  Goal: Patient will improve FOTO score by 13 points in order to demonstrate a significant improvement in function. Status at last note/certification: regressed significantly when being assessed at last Progress Note  Status at discharge: not met    Goal: Patient will report a 50% improvement in radicular symptoms in order to improve quality of life. Status at last note/certification: Progressing. 12/27/19  Status at discharge: not met    Goal: Patient will perform deep cervical flexion endurance to 25 seconds in order to increase ease of ADLs.    Status at last note/certification: 20 sec assessed at last Progress Note  Status at discharge: not met    Key functional improvement: improving neck pain    ASSESSMENT/RECOMMENDATIONS: Pt making steady progress but requested self discharge due to having surgery.     [x]Discontinue therapy: [x]Patient has reached or is progressing toward set goals      [x]Patient is non-compliant or has abdicated      []Due to lack of appreciable progress towards set goals    Brendan Gallardo, PT 2/6/2020 10:39 AM

## 2020-02-07 ENCOUNTER — HOSPITAL ENCOUNTER (OUTPATIENT)
Dept: PHYSICAL THERAPY | Age: 60
Discharge: HOME | End: 2020-02-07
Payer: MEDICAID

## 2020-02-07 PROCEDURE — 97110 THERAPEUTIC EXERCISES: CPT

## 2020-02-07 PROCEDURE — 97161 PT EVAL LOW COMPLEX 20 MIN: CPT

## 2020-02-07 NOTE — PROGRESS NOTES
PT DAILY TREATMENT NOTE - [facility]     Patient Name: Vinnie Torres  Date:2020  : 1960  [x]  Patient  Verified  Payor: [facility] MEDICAID / Plan: [facility] [employer] CCCP / Product Type: Managed Care Medicaid /    In time:1000  Out time:1050  Total Treatment Time (min): 50    Visit #: 1     Treatment Area: Pain in left hip [M25.552]    SUBJECTIVE  Pain Level (0-10 scale): 5/10  Any medication changes, allergies to medications, adverse drug reactions, diagnosis change, or new procedure performed?: [x] No    [] Yes (see summary sheet for update)  Subjective functional status/changes:   [] No changes reported  See eval    OBJECTIVE   []redness - adverse reaction:     25 min []Eval                  []Re-Eval       25 min Therapeutic Exercise:  [] See flow sheet :   Rationale: increase ROM, increase strength, improve coordination and improve balance to improve the patients ability to ease with ADL's          With   [] TE   [] TA   [] neuro   [] other: Patient Education: [x] Review HEP    [] Progressed/Changed HEP based on:   [] positioning   [] body mechanics   [] transfers   [] heat/ice application    [] other:      Other Objective/Functional Measures: see eval     Pain Level (0-10 scale) post treatment: 5/10    ASSESSMENT/Changes in Function: see eval    Patient will continue to benefit from skilled PT services to modify and progress therapeutic interventions, address functional mobility deficits, address ROM deficits, address strength deficits, analyze and address soft tissue restrictions, analyze and cue movement patterns, analyze and modify body mechanics/ergonomics, assess and modify postural abnormalities and address imbalance/dizziness to attain remaining goals.      [x]  See Plan of Care  []  See progress note/recertification  []  See Discharge Summary         Progress towards goals / Updated goals:  See poc    PLAN  [x]  Upgrade activities as tolerated     [x]  Continue plan of care  []  Update interventions per flow sheet       []  Discharge due to:_  []  Other:_      Efra Parker, PT 2/7/2020  9:03 AM    Future Appointments   Date Time Provider Wanda Chavez   2/7/2020 10:00 AM Brenda Elena, PT [facility]   2/21/2020  9:30 AM KANE Flaherty [address] [address]

## 2020-02-07 NOTE — PROGRESS NOTES
[facility] - Lonny Morley  [address] [facility]  [phone] fax    Plan of Care/ Statement of Necessity for Physical Therapy Services    Patient name: Subha Torres Start of Care: 2020   Referral source: Du Hand : 1960    Medical Diagnosis: Pain in left hip [M25.552]  Payor: [facility] MEDICAID / Plan: [facility] COMMUNITY PLAN [facility] / Product Type: Managed Care Medicaid /  Onset Date:20    Treatment Diagnosis: Left THR   Prior Hospitalization: see medical history Provider#: [id number]   Medications: Verified on Patient summary List    Comorbidities: Arthritis, HTN, Back Pain. Prior Level of Function: Pt works as a . Want to get back to [place] activities. Likes to go  Dancing. The Plan of Care and following information is based on the information from the initial evaluation. Assessment/ key information: Pt is a 63 yr old male SP left Direct Anterior THR, 20. Pt reports 5/10 pain especially upon waking up in the morning and decreases during the course of the day. . Incision is healed and 2 steri strips have fallen out. Pt denies and paresthesias and sensation is intact. Hip strength is grossly 4/5. Presley's Sign is negative. There is TTP to proximal Quad, proximal and distal IT band, Gastroc muscles. Pt has decreased flexibility to his LE's and glute/core/hip weakness. Pt will benefit from skilled therapy to improve hip strength , balance. ROM, decrease pain and ambulation function to return to OF.      Evaluation Complexity History LOW Complexity : Zero comorbidities / personal factors that will impact the outcome / POC; Examination LOW Complexity : 1-2 Standardized tests and measures addressing body structure, function, activity limitation and / or participation in recreation  ;Presentation LOW Complexity : Stable, uncomplicated  ;Clinical Decision Making MEDIUM Complexity : FOTO score of 26-74  Overall Complexity Rating: LOW   Problem List: pain affecting function, decrease ROM, decrease strength, impaired gait/ balance, decrease ADL/ functional abilitiies, decrease activity tolerance, decrease flexibility/ joint mobility and decrease transfer abilities   Treatment Plan may include any combination of the following: Therapeutic exercise, Therapeutic activities, Neuromuscular re-education, Physical agent/modality, Gait/balance training, Manual therapy, Aquatic therapy, Patient education, Self Care training and Functional mobility training  Patient / Family readiness to learn indicated by: asking questions, trying to perform skills and interest  Persons(s) to be included in education: patient (P)  Barriers to Learning/Limitations: None  Patient Goal (s): to get stronger, get back to work and [employer]  Patient Self Reported Health Status: good  Rehabilitation Potential: good    Short Term Goals: To be accomplished in 1 weeks:   1. Pt will be independent and demonstrate hip precautions per Hip Protocol. Long Term Goals: To be accomplished in 6 weeks:   1. Pt will increase FOTO score by 16   pts to improve left hip function. 2. Pt will increase left hip abduction strength to 5/5 to ease with return to job duties as a . 3. Pt will demonstrate a Negative Trendelenburg Test to improve hip/glute strength to return to PLOF. 4. Pt will report >80% improvement to return to PLOF. Frequency / Duration: Patient to be seen 2-3 times per week for 6 weeks. Patient/ Caregiver education and instruction: Diagnosis, prognosis, activity modification and exercises   [x]  Plan of care has been reviewed with ROBERTO Bryant, PT 2/7/2020 9:04 AM    ________________________________________________________________________    I certify that the above Therapy Services are being furnished while the patient is under my care.  I agree with the treatment plan and certify that this therapy is necessary.     Physician's Signature:____________Date:_________TIME:________    ** Signature, Date and Time must be completed for valid certification **    Please sign and return to [facility] - [facility] [facility]  KAYLEE  [address] [place] [address]  [phone] fax

## 2020-02-10 DIAGNOSIS — M16.10 ARTHRITIS, HIP: Primary | ICD-10-CM

## 2020-02-10 NOTE — TELEPHONE ENCOUNTER
Patient states he only has 1 pill left    Last Visit: 1/30/20 with SUNITHA Ashford  Next Appointment: 2/21/20 with SUNITHA Ashford  Previous Refill Encounter(s): 1/13/20 #60    Requested Prescriptions     Pending Prescriptions Disp Refills    oxyCODONE-acetaminophen (PERCOCET 7.5) 7.5-325 mg per tablet 60 Tab 0     Sig: Take 1-2 Tabs by mouth every four (4) hours as needed for Pain for up to 7 days. Max Daily Amount: 12 Tabs.

## 2020-02-11 ENCOUNTER — HOSPITAL ENCOUNTER (OUTPATIENT)
Dept: PHYSICAL THERAPY | Age: 60
Discharge: HOME | End: 2020-02-11
Payer: MEDICAID

## 2020-02-11 ENCOUNTER — TELEPHONE (OUTPATIENT)
Dept: ORTHOPEDIC SURGERY | Facility: CLINIC | Age: 60
End: 2020-02-11

## 2020-02-11 PROCEDURE — 97140 MANUAL THERAPY 1/> REGIONS: CPT

## 2020-02-11 PROCEDURE — 97110 THERAPEUTIC EXERCISES: CPT

## 2020-02-11 RX ORDER — OXYCODONE AND ACETAMINOPHEN 7.5; 325 MG/1; MG/1
1-2 TABLET ORAL
Qty: 56 TAB | Refills: 0 | Status: SHIPPED | OUTPATIENT
Start: 2020-02-11 | End: 2020-02-18

## 2020-02-11 NOTE — TELEPHONE ENCOUNTER
Pharmacist Timothy called for [facility]. Ms. Eve Yoni said that they need Clarification on the Dosage for the Oxycodone medication that was prescribed to the patient today. Ms. Eve Vallejo said that when we return her call that we can speak to any one at the [address] [facility] tel. [phone].

## 2020-02-11 NOTE — PROGRESS NOTES
-+PT DAILY TREATMENT NOTE 10-18    Patient Name: Lamine Ramirez  Date:2020  : 1960  [x]  Patient  Verified  Payor: [facility] MEDICAID / Plan: [facility] / Product Type: Managed Care Medicaid /    In time: 9:06  Out time: 9:40  Total Treatment Time (min): 34  Visit #: 2 of 18    Medicare/BCBS Only   Total Timed Codes (min):  34 1:1 Treatment Time:  34       Treatment Area: Pain in left hip [M25.552]    SUBJECTIVE  Pain Level (0-10 scale): 6-710  Any medication changes, allergies to medications, adverse drug reactions, diagnosis change, or new procedure performed?: [x] No    [] Yes (see summary sheet for update)  Subjective functional status/changes:   [] No changes reported  Pt reports increased pain along his incision of the left hip. He hasn't had to walk with a cane or walker since about 2 weeks out of surgery and is only taking 2 percocet a day. 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 He notes he needs to get his right knee replaced as well and has some bulging discs in his low back that bother him too.      OBJECTIVE    21 min Therapeutic Exercise:  [x] See flow sheet :   Rationale: increase ROM and increase strength to improve the patients ability to ambulate with improved gait and decreased pain    13 min Manual Therapy: leg lengthening for left upslip; MET for right AI; shotgun technique; Leg length measurement with left tibial differences shorter leg by 1/4 inch (added 1/4\" lift to left shoe to trial)   Rationale: increase ROM and increase strength to improve the patients ability to ambulate with improved gait and decreased pain                 With   [] TE   [] TA   [] neuro   [] other: Patient Education: [x] Review HEP    [] Progressed/Changed HEP based on:   [] positioning   [] body mechanics   [] transfers   [] heat/ice application    [] other:      Other Objective/Functional Measures:   Improved gait with decreased pain post manual and application of heel lift 1/4\" to left shoe for true tibial differences in hooklying  Genu valgus deformity and decreased glute strength  Used TB for abduction with mini squats to encourage better knee alignment and less valgus  Educated to remove heel lift if he has increased pain  Educated on right SKTC to allow for left hip flexor stretching in neutral position    Pain Level (0-10 scale) post treatment: 4/10    ASSESSMENT/Changes in Function: Initiated exercise program per POC and pt compliant with initial HEP. He has a true leg length left shorter than right by 1/4\" with true tibial differences likely impacting his gait and low back. He has decreased glute strength and genu valgus deformity of his knees. He has poor squat mechanics with increased back flexion and valgus collapse likely contributing to his ongoing back pain when lifting for work. Will work to strengthen his hips for improved ease of ambulation and for progression back into work activities. Short Term Goals: To be accomplished in 1 weeks:  1. Pt will be independent and demonstrate hip precautions per Hip Protocol. MET  Long Term Goals: To be accomplished in 6 weeks:  1. Pt will increase FOTO score by 16   pts to improve left hip function. 2. Pt will increase left hip abduction strength to 5/5 to ease with return to job duties as a . 3. Pt will demonstrate a Negative Trendelenburg Test to improve hip/glute strength to return to PLOF. 4. Pt will report >80% improvement to return to PLOF.      PLAN  [x]  Upgrade activities as tolerated     [x]  Continue plan of care  []  Update interventions per flow sheet       []  Discharge due to:_  [x]  Other: continue working on squat mechanics with TA hold to help with return to work duties      Chetna Abarca PTA 2/11/2020  8:56 AM    Future Appointments   Date Time Provider Wanda Chavez   2/11/2020 9:00 AM Omar Rodriguez, PTA [facility]   2/12/2020 11:00 AM Eduardo Rah, PTA [facility]   2/14/2020 10:00 AM Eduardo Rah, PTA [facility]   2/17/2020 10:00 AM Yanni Wymanne [facility]   2/19/2020  9:30 AM Lourena Rather, PT [facility]   2/21/2020  9:30 AM KANE Flaherty [address]   2/21/2020  1:30 PM Eduardo Monreal, PTA [facility]   2/24/2020 10:30 AM Eduardocadence Monreal, PTA [facility]   2/26/2020 12:00 PM Locarmena Rather, PT [facility]   2/28/2020 10:30 AM Lourena Rather, PT [facility]   3/2/2020  9:00 AM Eduardo Rah, PTA [facility]   3/4/2020 10:00 AM Lourena Rather, PT [facility]   3/6/2020  9:00 AM Eduardo Rah, PTA [facility]   3/9/2020  9:00 AM Eduardo Rah, PTA [facility]   3/11/2020 10:00 AM Lourena Rather, PT [facility]   3/13/2020  9:00 AM Eduardo Rah, PTA [facility]   3/16/2020  9:00 AM Eduardo Rah, PTA [facility]   3/18/2020 10:00 AM Lourena Rather, PT [facility]   3/19/2020  3:30 PM Ev Ewing MD [facility]   3/20/2020 10:00 AM Eduardo Monreal PTA [facility]

## 2020-02-12 ENCOUNTER — APPOINTMENT (OUTPATIENT)
Dept: PHYSICAL THERAPY | Age: 60
End: 2020-02-12
Payer: MEDICAID

## 2020-02-12 NOTE — TELEPHONE ENCOUNTER
Spoke with pharmacist, notified her per SUNITHA hines to modify Rx as needed to fill. Attending physician name given as well upon request, Dr. Delbert Randall. No further action required.

## 2020-02-14 ENCOUNTER — HOSPITAL ENCOUNTER (OUTPATIENT)
Dept: PHYSICAL THERAPY | Age: 60
End: 2020-02-14
Payer: MEDICAID

## 2020-02-17 ENCOUNTER — HOSPITAL ENCOUNTER (OUTPATIENT)
Dept: PHYSICAL THERAPY | Age: 60
Discharge: HOME | End: 2020-02-17
Payer: MEDICAID

## 2020-02-17 PROCEDURE — 97110 THERAPEUTIC EXERCISES: CPT

## 2020-02-17 NOTE — PROGRESS NOTES
PT DAILY TREATMENT NOTE 10-18    Patient Name: Vignesh Ruvalcaba  Date:2020  : 1960  [x]  Patient  Verified  Payor: [facility] MEDICAID / Plan: [facility] COMMUN[facility] PL[facility] / Product Type: Managed Care Medicaid /    In time:10:05  Out time:10:42  Total Treatment Time (min): 37  Visit #: 4 of 18      Treatment Area: Pain in left hip [M25.552]    SUBJECTIVE  Pain Level (0-10 scale): 6/10  Any medication changes, allergies to medications, adverse drug reactions, diagnosis change, or new procedure performed?: [x] No    [] Yes (see summary sheet for update)  Subjective functional status/changes:   [] No changes reported  \"I'm in pain today, but it's my low back that bothers me, not my hip. \"    OBJECTIVE    Modality rationale: pt declined   Min Type Additional Details    [] Estim:  []Unatt       []IFC  []Premod                        []Other:  []w/ice   []w/heat  Position:  Location:    [] Estim: []Att    []TENS instruct  []NMES                    []Other:  []w/US   []w/ice   []w/heat  Position:  Location:    []  Traction: [] Cervical       []Lumbar                       [] Prone          []Supine                       []Intermittent   []Continuous Lbs:  [] before manual  [] after manual    []  Ultrasound: []Continuous   [] Pulsed                           []1MHz   []3MHz W/cm2:  Location:    []  Iontophoresis with dexamethasone         Location: [] Take home patch   [] In clinic    []  Ice     []  heat  []  Ice massage  []  Laser   []  Anodyne Position:  Location:    []  Laser with stim  []  Other:  Position:  Location:    []  Vasopneumatic Device Pressure:       [] lo [] med [] hi   Temperature: [] lo [] med [] hi   [] Skin assessment post-treatment:  []intact []redness- no adverse reaction    []redness - adverse reaction:       37 min Therapeutic Exercise:  [] See flow sheet :   Rationale: increase ROM and increase strength to improve the patients ability to perform ADLs with ease            With   [x] TE   [] TA   [] neuro   [] other: Patient Education: [x] Review HEP    [] Progressed/Changed HEP based on:   [x] positioning   [x] body mechanics   [] transfers   [] heat/ice application    [] other:      Other Objective/Functional Measures: initiated full POC     Pain Level (0-10 scale) post treatment: 4/10    ASSESSMENT/Changes in Function: Patient presented today with some knee and low back pain, but no issues reported with his hip. Minisquats were a little painful due to some genu valgus bilaterally; OTB used for cuing to activate hip abductors. He did well with clams in all 3 directions, however required some verbal and tactile cuing for form with sidelying hip abduction. Patient will continue to benefit from skilled PT services to modify and progress therapeutic interventions, address functional mobility deficits, address ROM deficits, address strength deficits, analyze and address soft tissue restrictions, analyze and cue movement patterns, analyze and modify body mechanics/ergonomics, assess and modify postural abnormalities, address imbalance/dizziness and instruct in home and community integration to attain remaining goals. [x]  See Plan of Care  []  See progress note/recertification  []  See Discharge Summary         Progress towards goals / Updated goals:  Short Term Goals: To be accomplished in 1 weeks:  1. Pt will be independent and demonstrate hip precautions per Hip Protocol. MET  Long Term Goals: To be accomplished in 6 weeks:  1. Pt will increase FOTO score by 16   pts to improve left hip function. 2. Pt will increase left hip abduction strength to 5/5 to ease with return to job duties as a . 3. Pt will demonstrate a Negative Trendelenburg Test to improve hip/glute strength to return to PLOF.   4. Pt will report >80% improvement to return to PLOF.      PLAN  []  Upgrade activities as tolerated     [x]  Continue plan of care  []  Update interventions per flow sheet       []  Discharge due to:_  []  Other:_      Moy Mckinleyy 2/17/2020  10:23 AM    Future Appointments   Date Time Provider Wanda Chavez   2/19/2020  9:30 AM Cynharpreet Ponce, PT [facility] [address]   2/21/2020  9:30 AM KANE Sommers   2/21/2020  1:30 PM Alyssa Said, PTA [facility] [address]   2/24/2020 10:30 AM Olmaneanaty Said, PTA [facility] [address]   2/26/2020 12:00 PM Cyntha Rosario, PT [facility] [address]   2/28/2020 10:30 AM Cyntha Rosario, PT [facility] [address]   3/2/2020  9:00 AM Montanaldean Said, PTA [facility] [address]   3/4/2020 10:00 AM Cyntha Rosario, PT [facility] [address]   3/6/2020  9:00 AM Olmaneanaty Said, PTA [facility] [address]   3/9/2020  9:00 AM Olmaneanaty Said, PTA [facility] [address]   3/11/2020 10:00 AM Cyntha Rosario, PT [facility] [address]   3/13/2020  9:00 AM Montanaldean Said, PTA [facility] [address]   3/16/2020  9:00 AM Olmaneanaty Said, PTA [facility] [address]   3/18/2020 10:00 AM Cyntha Rosario, PT [facility] [address]   3/19/2020  3:30 PM Reji Umana MD [facility]   3/20/2020 10:00 AM Olmaneanaty Said, PTA [facility] [address]

## 2020-02-21 ENCOUNTER — HOSPITAL ENCOUNTER (OUTPATIENT)
Dept: PHYSICAL THERAPY | Age: 60
Discharge: HOME | End: 2020-02-21
Payer: MEDICAID

## 2020-02-21 PROCEDURE — 97110 THERAPEUTIC EXERCISES: CPT

## 2020-02-21 NOTE — PROGRESS NOTES
PT DAILY TREATMENT NOTE 10-18    Patient Name: Luba Rose  Date:2020  : 1960  [x]  Patient  Verified  Payor: [facility] MEDICAID / Plan: [facility] COMMUNITY PLAN [employer] [employer] / Product Type: Managed Care Medicaid /    In time:130  Out time:200  Total Treatment Time (min): 30  Visit #: 5 of 18    Treatment Area: Pain in left hip [M25.552]    SUBJECTIVE  Pain Level (0-10 scale): 0/10  Any medication changes, allergies to medications, adverse drug reactions, diagnosis change, or new procedure performed?: [x] No    [] Yes (see summary sheet for update)  Subjective functional status/changes:   [] No changes reported  Pt stated that he is doing well today and has no pain    OBJECTIVE    30 min Therapeutic Exercise:  [x] See flow sheet :   Rationale: increase ROM and increase strength to improve the patients ability to increase ease with ADLs    With   [x] TE   [] TA   [] neuro   [] other: Patient Education: [x] Review HEP    [] Progressed/Changed HEP based on:   [] positioning   [] body mechanics   [] transfers   [] heat/ice application    [] other:      Other Objective/Functional Measures:   Had no difficulty with exercises  No complaint of increased ease with ADLs  No difficulty with 8\" step ups, but was challenged     Pain Level (0-10 scale) post treatment: 0/10    ASSESSMENT/Changes in Function:   Pt is progressing well toward goals. Strength is improving in the left hip. Pt reported 80-85% improvement since eval.    Patient will continue to benefit from skilled PT services to modify and progress therapeutic interventions, address functional mobility deficits, address ROM deficits, address strength deficits, analyze and cue movement patterns, analyze and modify body mechanics/ergonomics and instruct in home and community integration to attain remaining goals.      [x]  See Plan of Care  []  See progress note/recertification  []  See Discharge Summary         Progress towards goals / Updated goals:  Short Term Goals: To be accomplished in 1 weeks:  1. Pt will be independent and demonstrate hip precautions per Hip Protocol. MET  Long Term Goals: To be accomplished in 6 weeks:  1. Pt will increase FOTO score by 16   pts to improve left hip function. 2. Pt will increase left hip abduction strength to 5/5 to ease with return to job duties as a . 3. Pt will demonstrate a Negative Trendelenburg Test to improve hip/glute strength to return to PLOF. 4. Pt will report >80% improvement to return to PLOF.   Goal met.  Pt reported 80-85% improvement in overall sx's since eval. 2/21/20    PLAN  []  Upgrade activities as tolerated     [x]  Continue plan of care  []  Update interventions per flow sheet       []  Discharge due to:_  []  Other:_      Ambar Armstrong PTA 2/21/2020  1:34 PM    Future Appointments   Date Time Provider Wanda Chavez   2/24/2020 10:30 AM [facility] Ramirotchley, PT [facility] [address]   2/26/2020 12:00 PM [facility] Katelynn, PT [facility] [address]   2/28/2020 10:30 AM [facility] Critchley, PT [facility] [address]   2/28/2020  1:30 PM KANE Davis   3/2/2020  9:00 AM Tiara Quesada, PTA [facility] [address]   3/4/2020 10:00 AM [facility] Critchley, PT [facility] [address]   3/6/2020  9:00 AM Tiara Quesada, PTA [facility] [address]   3/9/2020  9:00 AM Tiara Sangita, PTA [facility] [address]   3/11/2020 10:00 AM [facility] Critchley, PT [facility] [address]   3/13/2020  9:00 AM Tiara Quesada, PTA [facility] [address]   3/16/2020  9:00 AM Tiara Quesada, PTA [facility] [address]   3/18/2020 10:00 AM [facility] Critchley, PT [facility] [address]   3/19/2020  3:30 PM Dickey Idol, MD [facility]   3/20/2020 10:00 AM Tiara Quesada PTA [facility] [address]

## 2020-02-27 DIAGNOSIS — Z96.642 HISTORY OF LEFT HIP REPLACEMENT: Primary | ICD-10-CM

## 2020-02-27 RX ORDER — OXYCODONE AND ACETAMINOPHEN 7.5; 325 MG/1; MG/1
1-2 TABLET ORAL
Qty: 42 TAB | Refills: 0 | Status: SHIPPED | OUTPATIENT
Start: 2020-02-27 | End: 2020-03-05

## 2020-02-27 NOTE — TELEPHONE ENCOUNTER
Last visit 01/30/2020 SUNITHA Lang   Next appointment 02/28/2020 SUNITHA Lang   Previous refill encounter(s) 02/11/2020 #56    Requested Prescriptions     Pending Prescriptions Disp Refills    oxyCODONE-acetaminophen (PERCOCET) 7.5-325 mg per tablet 56 Tab 0     Sig: Take 1-2 Tabs by mouth every six (6) hours as needed for Pain for up to 7 days. Max Daily Amount: 8 Tabs.

## 2020-02-27 NOTE — TELEPHONE ENCOUNTER
[address] [facility]. Pharmacist states Dio Monzon already talked with someone so she let it go. \"

## 2020-02-27 NOTE — TELEPHONE ENCOUNTER
Post op patient called again wanting to know when someone could call the pharmacy so that he may  the medication. [address]. [phone]. Patient tel.  [phone]

## 2020-02-27 NOTE — TELEPHONE ENCOUNTER
Cely Arredondo Pharmacist calling as she cannot fill the Percocet as written. Can do l tab every 8 hours (opiod guide lines) Need to call Salvador Medina to [phone].

## 2020-02-28 ENCOUNTER — HOSPITAL ENCOUNTER (OUTPATIENT)
Dept: PHYSICAL THERAPY | Age: 60
Discharge: HOME | End: 2020-02-28
Payer: MEDICAID

## 2020-02-28 ENCOUNTER — OFFICE VISIT (OUTPATIENT)
Dept: ORTHOPEDIC SURGERY | Age: 60
End: 2020-02-28

## 2020-02-28 VITALS
OXYGEN SATURATION: 100 % | HEART RATE: 63 BPM | WEIGHT: 186.2 LBS | BODY MASS INDEX: 24.68 KG/M2 | HEIGHT: 73 IN | TEMPERATURE: 97.5 F | SYSTOLIC BLOOD PRESSURE: 141 MMHG | DIASTOLIC BLOOD PRESSURE: 89 MMHG

## 2020-02-28 DIAGNOSIS — Z96.642 HISTORY OF LEFT HIP REPLACEMENT: Primary | ICD-10-CM

## 2020-02-28 DIAGNOSIS — M17.0 PRIMARY OSTEOARTHRITIS OF BOTH KNEES: ICD-10-CM

## 2020-02-28 PROCEDURE — 97110 THERAPEUTIC EXERCISES: CPT

## 2020-02-28 RX ORDER — BETAMETHASONE SODIUM PHOSPHATE AND BETAMETHASONE ACETATE 3; 3 MG/ML; MG/ML
6 INJECTION, SUSPENSION INTRA-ARTICULAR; INTRALESIONAL; INTRAMUSCULAR; SOFT TISSUE ONCE
Qty: 1 ML | Refills: 0
Start: 2020-02-28 | End: 2020-02-28

## 2020-02-28 NOTE — PROGRESS NOTES
[address] [place] [address]  [phone]           Patient: Delilah Villar                MRN: [id number]       SSN: [id number]  YOB: 1960        AGE: 63 y.o. SEX: male  Body mass index is 24.57 kg/m². PCP: Zulma Rebolledo MD  02/28/20      This office note has been dictated. REVIEW OF SYSTEMS:  Constitutional: Negative for fever, chills, weight loss and malaise/fatigue. HENT: Negative. Eyes: Negative. Respiratory: Negative. Cardiovascular: Negative. Gastrointestinal: No bowel incontinence or constipation. Genitourinary: No bladder incontinence or saddle anesthesia. Skin: Negative. Neurological: Negative. Endo/Heme/Allergies: Negative. Psychiatric/Behavioral: Negative. Musculoskeletal: As per HPI above. Past Medical History:   Diagnosis Date    Anemia     Arthritis     Bilateral sciatica     Chronic pain     Herniated intervertebral disc of lumbar spine     High cholesterol     Hypertension     Muscle spasm of back          Current Outpatient Medications:     oxyCODONE-acetaminophen (PERCOCET) 7.5-325 mg per tablet, Take 1-2 Tabs by mouth every eight (8) hours as needed for Pain for up to 7 days. Max Daily Amount: 6 Tabs., Disp: 42 Tab, Rfl: 0    docusate sodium (COLACE) 100 mg capsule, Take 1 Cap by mouth two (2) times a day for 90 days. , Disp: 60 Cap, Rfl: 2    celecoxib (CELEBREX) 200 mg capsule, Take 1 Cap by mouth two (2) times a day for 90 days. , Disp: 60 Cap, Rfl: 2    aspirin (ASPIRIN) 325 mg tablet, Take 1 Tab by mouth two (2) times a day., Disp: 60 Tab, Rfl: 0    cephALEXin (KEFLEX) 500 mg capsule, Take 1 Cap by mouth four (4) times daily. , Disp: 8 Cap, Rfl: 0    atorvastatin (LIPITOR) 10 mg tablet, Take 10 mg by mouth daily. , Disp: , Rfl:     amLODIPine (NORVASC) 10 mg tablet, Take 10 mg by mouth daily. , Disp: , Rfl:     gabapentin (NEURONTIN) 100 mg capsule, Take  by mouth nightly., Disp: , Rfl:     cyclobenzaprine (FLEXERIL) 10 mg tablet, Take  by mouth three (3) times daily as needed for Muscle Spasm(s). , Disp: , Rfl:     No Known Allergies    Social History     Socioeconomic History    Marital status: SINGLE     Spouse name: Not on file    Number of children: Not on file    Years of education: Not on file    Highest education level: Not on file   Occupational History    Not on file   Social Needs    Financial resource strain: Not on file    Food insecurity:     Worry: Not on file     Inability: Not on file    Transportation needs:     Medical: Not on file     Non-medical: Not on file   Tobacco Use    Smoking status: Former Smoker     Types: Cigarettes     Last attempt to quit: 2016     Years since quittin.1    Smokeless tobacco: Never Used   Substance and Sexual Activity    Alcohol use: No    Drug use: Not on file    Sexual activity: Not on file   Lifestyle    Physical activity:     Days per week: Not on file     Minutes per session: Not on file    Stress: Not on file   Relationships    Social connections:     Talks on phone: Not on file     Gets together: Not on file     Attends Jew service: Not on file     Active member of club or organization: Not on file     Attends meetings of clubs or organizations: Not on file     Relationship status: Not on file    Intimate partner violence:     Fear of current or ex partner: Not on file     Emotionally abused: Not on file     Physically abused: Not on file     Forced sexual activity: Not on file   Other Topics Concern    Not on file   Social History Narrative    Not on file       Past Surgical History:   Procedure Laterality Date    COLONOSCOPY N/A 1/10/2020    PREVENTATIVE COLONOSCOPY performed by Keyur Rodarte MD at [address] [place] [address] HX ORTHOPAEDIC Left     hand laceration & ligament repair           * Patient was identified by name and date of birth   * Agreement on procedure being performed was verified  * Risks and Benefits explained to the patient  * Procedure site verified and marked as necessary  * Patient was positioned for comfort  * Consent was signed and verified  2:07 PM    The patient was instructed on post injection care. We did see Mr. Tito Morrow today in the office for followup with regards to his left hip replacement, as well as bilateral knee arthritis. With regards to the hip replacement, the patient is now six weeks out and doing quite well. She is quite happy with the results of the hip replacement. He does continue physical therapy without complications. He has had no troubles with the wound and no fevers, chills, systemic changes, or injuries to report. With regards to the knee, he does have known advanced arthritis of the knees. The right is a little bit worse than the left. He has had injections in the past, which give him good relief. He has decreased walking tolerance, trouble getting up from a chair and going up and down stairs. No mechanical symptomatology is noted, however. PHYSICAL EXAMINATION:  In general, the patient is alert and oriented x 3 in no acute distress. The patient is well-developed, well-nourished, with a normal affect. The patient is afebrile. HEENT:  Head is normocephalic and atraumatic. Pupils are equally round and reactive to light and accommodation. Extraocular eye movements are intact. Neck is supple. Trachea is midline. No JVD is present. Breathing is nonlabored. Examination of the lower extremities reveals pain-free range of motion of the hips. There is no pain to palpation of the greater trochanteric bursae. The surgical wounds are healed up nicely. There is negative straight leg raise. There is negative calf tenderness. There is negative Presley's. There is no evidence of DVT present. Leg lengths look perfect. Abduction strength is symmetric bilaterally.       Examination of each of the knees reveal the skin is intact. There is no erythema, no ecchymosis, no warmth, and no signs of infection or cellulitis present. He does have discomfort to palpation tricompartmentally about the knees and crepitus arising from the anterior compartment bilaterally. RADIOGRAPHS:  Radiographs obtained in the office today, February 28, 2020, at the [place], [place] of the pelvis and AP and cross table lateral of the left hip, show the total hip components to be well-fixed without evidence for loosening or fracture noted. Review of previous radiographs of the knees does confirm on the right to have end-staged arthritis with bone-to-bone eburnation and fairly advanced on the left. Valgus deformity is present bilaterally. ASSESSMENT:      1. Status post left hip replacement doing well. 2. Bilateral knee end-staged arthritis. PLAN:  At this point, we discussed treatment options. He will continue physical therapy with regards to the left hip. He recently had a refill of his pain medicines. With regards to the knees, we are going to move forward with a cortisone injection for each of the knees today. After informed and written consent with an appropriate time out performed, and under sterile conditions, with ultrasound-guided assistance, each knee was prepped with Betadine and 6 mg of Celestone was injected to each knee without complications. The patient tolerated the injections well. The patient was instructed on post injection care. We will see him back in the office in about six weeks' time for evaluation. He will call with any questions or concerns that shall arise.                      JR Yaakov FALLON, KANE, ATC

## 2020-02-28 NOTE — PROGRESS NOTES
PT DAILY TREATMENT NOTE 10-18    Patient Name: Vince Odom  Date:2020  : 1960  [x]  Patient  Verified  Payor: [facility] MEDICAID / Plan: [facility] COMMUNITY PLAN [employer] / Product Type: Managed Care Medicaid /    In time:1035  Out time:1100  Total Treatment Time (min): 25  Visit #: 6 of 18    Treatment Area: Pain in left hip [M25.552]    SUBJECTIVE  Pain Level (0-10 scale): 0/10  Any medication changes, allergies to medications, adverse drug reactions, diagnosis change, or new procedure performed?: [x] No    [] Yes (see summary sheet for update)  Subjective functional status/changes:   [] No changes reported  Pt stated that he is doing pretty good today    OBJECTIVE      25 min Therapeutic Exercise:  [x] See flow sheet :   Rationale: increase ROM and increase strength to improve the patients ability to increase ease with ADLs          With   [x] TE   [] TA   [] neuro   [] other: Patient Education: [x] Review HEP    [] Progressed/Changed HEP based on:   [] positioning   [] body mechanics   [] transfers   [] heat/ice application    [] other:      Other Objective/Functional Measures:   Had no difficulty with exercises  Will make changes next visit per flow sheet     Pain Level (0-10 scale) post treatment: 0/10    ASSESSMENT/Changes in Function:   Pt is progressing well toward goals. Pt no longer has pain in the left hip. Has started taking small jobs. Strength is improving in the left hip. Gait is much improved    Patient will continue to benefit from skilled PT services to modify and progress therapeutic interventions, address functional mobility deficits, address ROM deficits, address strength deficits, analyze and cue movement patterns, address imbalance/dizziness and instruct in home and community integration to attain remaining goals.      [x]  See Plan of Care  []  See progress note/recertification  []  See Discharge Summary         Progress towards goals / Updated goals:  Short Term Goals: To be accomplished in 1 weeks:  1. Pt will be independent and demonstrate hip precautions per Hip Protocol.   MET  Long Term Goals: To be accomplished in 6 weeks:  1. Pt will increase FOTO score by 16   pts to improve left hip function. 2. Pt will increase left hip abduction strength to 5/5 to ease with return to job duties as a . Progressing. 2/28/20  3. Pt will demonstrate a Negative Trendelenburg Test to improve hip/glute strength to return to PLOF. 4. Pt will report >80% improvement to return to PLOF.   Goal met.  Pt reported 80-85% improvement in overall sx's since eval. 2/21/20    PLAN  []  Upgrade activities as tolerated     [x]  Continue plan of care  []  Update interventions per flow sheet       []  Discharge due to:_  []  Other:_      Brian Lr PTA 2/28/2020  10:32 AM    Future Appointments   Date Time Provider Wanda Chavez   2/28/2020  1:30 PM Xiomy Han PA-C [address] [place] [address]   3/2/2020  9:00 AM Haslettaidan Hall, PTA [facility] [address]   3/4/2020 10:00 AM Al Sabot, PT [facility] [address]   3/6/2020  9:00 AM Tito , PTA [facility] [address]   3/9/2020  9:00 AM Tito , PTA [facility] [address]   3/11/2020 10:00 AM Al Sabot, PT [facility] [address]   3/13/2020  9:00 AM Tito , PTA [facility] [address]   3/16/2020  9:00 AM Tito , PTA [facility] [address]   3/18/2020 10:00 AM Al Sabot, PT [facility] [address]   3/19/2020  3:30 PM Aniyah Cote MD [facility]   3/20/2020 10:00 AM Tito , PTA [facility] [address]

## 2020-03-02 ENCOUNTER — HOSPITAL ENCOUNTER (OUTPATIENT)
Dept: PHYSICAL THERAPY | Age: 60
Discharge: HOME | End: 2020-03-02
Payer: MEDICAID

## 2020-03-02 PROCEDURE — 97110 THERAPEUTIC EXERCISES: CPT

## 2020-03-02 NOTE — PROGRESS NOTES
PT DAILY TREATMENT NOTE 10-18    Patient Name: Tay Torres  Date:3/2/2020  : 1960  [x]  Patient  Verified  Payor: Delilah Party / Plan: [facility] COMMUNITY PLAN [employer]P / Product Type: Managed Care Medicaid /    In time:8:59  Out time: 9:43  Total Treatment Time (min): 44  Visit #: 2 of      Treatment Area: Pain in left hip [M25.552]    SUBJECTIVE  Pain Level (0-10 scale): 6.5/10  Any medication changes, allergies to medications, adverse drug reactions, diagnosis change, or new procedure performed?: [x] No    [] Yes (see summary sheet for update)  Subjective functional status/changes:   [] No changes reported  Pt reports that he is sore today because 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  He is stiff today after working for a long time yesterday and then lying down for a couple hours. He has the most difficulty with standing/walking >10-15 minutes. He took his BP medication at 7:30am this morning. He takes his BP medication in the morning but the time varies depending when he wakes up. OBJECTIVE    5 minutes AD to warm up - No Charge    39 min Therapeutic Exercise:  [x] See flow sheet :   Rationale: increase ROM, increase strength and improve balance to improve the patients ability to improve ease of ambulation and stairs             With   [] TE   [] TA   [] neuro   [] other: Patient Education: [x] Review HEP    [] Progressed/Changed HEP based on:   [] positioning   [] body mechanics   [] transfers   [] heat/ice application    [x] other: advised pt to take BP medication at the same time every day, instructed pt to take BP 3-4x per day and keep a log for 2 weeks, follow up with MD if BP remained elevated.        Other Objective/Functional Measures:     No LOB with MSR EC    Challenged with side steps with blue TB with theraband around knees  Able to perform stairs reciprocally without UE support, slight difficulty with push off with ascending with left LE and slightly decreased eccentric control with lowering with left LE   Required cues to roll hips forward to reduce TFL substitution with clams, improved form with cuing  Challenged with initiation of SLR x 3, held initiation of SLR flexion d/t increased soreness this visit   No difficulty with initiation of LE machines  No pain following session     Pain Level (0-10 scale) post treatment: 0/10    ASSESSMENT/Changes in Function:    Pt is making steady progress towards updated goals in therapy. He continues to be limited by LBP and knee pain, but he reports 90% overall improvement with hip pain. Ability with stairs is improving, and pt reports greatest limitation is continued decreased standing/ambulatory tolerance. Will continue to address strength, flexibility, and balance deficits to improve standing/ambulatory tolerance and allow for return to PLOF. Patient will continue to benefit from skilled PT services to modify and progress therapeutic interventions, address functional mobility deficits, address ROM deficits, address strength deficits, analyze and address soft tissue restrictions, analyze and cue movement patterns, analyze and modify body mechanics/ergonomics, assess and modify postural abnormalities, address imbalance/dizziness and instruct in home and community integration to attain remaining goals. Progress towards goals / Updated goals:  Short Term Goals: To be accomplished in 1 weeks:  1. Pt will be independent and demonstrate hip precautions per Hip Protocol.   MET  Long Term Goals: To be accomplished in 6 weeks:  1. Pt will increase FOTO score by 16   pts to improve left hip function. 2. Pt will increase left hip abduction strength to 5/5 to ease with return to job duties as a . Progressing. 2/28/20  3. Pt will demonstrate a Negative Trendelenburg Test to improve hip/glute strength to return to PLOF.   4. Pt will report >80% improvement to return to [facility] met. Pt reported 80-85% improvement in overall sx's since eval. 2/21/20 Goal met.   Pt reports 90% overall improvement for his hip since start of care 3/2/20        PLAN  []  Upgrade activities as tolerated     [x]  Continue plan of care  []  Update interventions per flow sheet       []  Discharge due to:_  []  Other:_      Edmond Adams, PT 3/2/2020  9:13 AM    Future Appointments   Date Time Provider Wanda Chavez   3/4/2020 10:00 AM Julissa Ponce, PT [facility] [address]   3/6/2020  9:00 AM Gearldean Said, PTA [facility] [address]   3/9/2020  9:00 AM Gearldean Said, PTA [facility] [address]   3/11/2020 10:00 AM Cyntha Rosario, PT [facility] [address]   3/13/2020  9:00 AM Gearldean Said, PTA [facility] [address]   3/16/2020  9:00 AM Gearldean Said, PTA [facility] [address]   3/18/2020 10:00 AM Cyntha Rosario, PT [facility] [address]   3/19/2020  3:30 PM Reji Umana MD [facility]   3/20/2020 10:00 AM Gearldean Said, PTA [facility] [address]   4/10/2020  1:20 PM KANE Sommers [address]

## 2020-03-04 ENCOUNTER — APPOINTMENT (OUTPATIENT)
Dept: PHYSICAL THERAPY | Age: 60
End: 2020-03-04
Payer: MEDICAID

## 2020-03-06 ENCOUNTER — APPOINTMENT (OUTPATIENT)
Dept: PHYSICAL THERAPY | Age: 60
End: 2020-03-06
Payer: MEDICAID

## 2020-03-06 ENCOUNTER — HOSPITAL ENCOUNTER (OUTPATIENT)
Dept: PHYSICAL THERAPY | Age: 60
Discharge: HOME | End: 2020-03-06
Payer: MEDICAID

## 2020-03-06 PROCEDURE — 97110 THERAPEUTIC EXERCISES: CPT

## 2020-03-06 NOTE — PROGRESS NOTES
[facility] - Naima Yepez  [address] [facility]  [phone] fax    Physical Therapy Progress Note  Patient name: Denise Torres Start of Care: 2020   Referral source: Yamini Bolivar : 1960               Medical Diagnosis: Pain in left hip [M25.552]  Payor: [facility] MEDICAID / Plan: [facility] COMMUNITY PLAN EDITH CCCP / Product Type: Managed Care Medicaid /  Onset Date:20               Treatment Diagnosis: Left THR   Prior Hospitalization: see medical history Provider#: [id number]   Medications: Verified on Patient summary List    Comorbidities: Arthritis, HTN, Back Pain. Prior Level of Function: Pt works as a . Want to get back to [employer] activities. Likes to go  Dancing. Visits from Start of Care: 8    Missed Visits: 5    Established Goals:         Excellent           Good         Limited           None  [x] Increased ROM   [x]  []  []  []  [x] Increased Strength  [x]  []  []  []  [x] Increased Mobility  [x]  []  []  []   [x] Decreased Pain   [x]  []  []  []  [] Decreased Swelling  []  []  []  []    Key Functional Changes:   FOTO increased from 31 to 51  Strength in left hip for abd is 5/5  Patient no longer ambulates with Trendelenburg gait pattern    Updated Goals: to be achieved in 2 weeks:  1. Patient will be issued final HEP and will show understanding of all exercises to maintain progress made in therapy. ASSESSMENT/RECOMMENDATIONS:  Patient is progressing well with therapy. Strength in the left hip is 5/5. FOTO score increased 20 points indicating good increase in functional ability. Patient is ambulating with normal gait pattern and no longer has pain. Patient will benefit from 4 final sessions of skilled physical therapy in order to address remaining deficits and establish/ensure understanding/compliance with final HEP in order to allow for continued progression independently following DC.       [x]Continue therapy per initial plan/protocol at a frequency of  2 x per week for 2 weeks  []Continue therapy with the following recommended changes:_____________________      _____________________________________________________________________  []Discontinue therapy progressing towards or have reached established goals  []Discontinue therapy due to lack of appreciable progress towards goals  []Discontinue therapy due to lack of attendance or compliance  []Await Physician's recommendations/decisions regarding therapy  []Other:________________________________________________________________    Thank you for this referral.   Ruslan Steele, PTA 3/6/2020 4:02 PM    NOTE TO PHYSICIAN:  [address]   FAX TO [facility]: [phone]  If you are unable to process this request in 24 hours please contact our office: [phone] I have read the above report and request that my patient continue as recommended. ? I have read the above report and request that my patient continue therapy with the following changes/special instructions:____________________________________  ? I have read the above report and request that my patient be discharged from therapy.     Physicians signature: ______________________________Date: ______Time:______

## 2020-03-06 NOTE — PROGRESS NOTES
PT DAILY TREATMENT NOTE 10-18    Patient Name: Raudel Torres  Date:3/6/2020  : 1960  [x]  Patient  Verified  Payor: [facility] MEDICAID / Plan: [facility] PLAN [facility] / Product Type: Managed Care Medicaid /    In time:230  Out time:300  Total Treatment Time (min): 30  Visit #: 8 of 18    Treatment Area: Pain in left hip [M25.552]    SUBJECTIVE  Pain Level (0-10 scale): 0/10  Any medication changes, allergies to medications, adverse drug reactions, diagnosis change, or new procedure performed?: [x] No    [] Yes (see summary sheet for update)  Subjective functional status/changes:   [] No changes reported  Pt stated that he is doing pretty good today    OBJECTIVE    30 min Therapeutic Exercise:  [x] See flow sheet :   Rationale: increase ROM and increase strength to improve the patients ability to increase ease with ADLs    With   [x] TE   [] TA   [] neuro   [] other: Patient Education: [x] Review HEP    [] Progressed/Changed HEP based on:   [] positioning   [] body mechanics   [] transfers   [] heat/ice application    [] other:      Other Objective/Functional Measures:   FOTO 51     Pain Level (0-10 scale) post treatment: 0/10    ASSESSMENT/Changes in Function:   See progress note    Patient will continue to benefit from skilled PT services to modify and progress therapeutic interventions, address functional mobility deficits, address ROM deficits, address strength deficits, analyze and cue movement patterns, analyze and modify body mechanics/ergonomics, assess and modify postural abnormalities and instruct in home and community integration to attain remaining goals. []  See Plan of Care  [x]  See progress note/recertification  []  See Discharge Summary         Progress towards goals / Updated goals:  Short Term Goals: To be accomplished in 1 weeks:  1. Pt will be independent and demonstrate hip precautions per Hip Protocol.   MET  Long Term Goals: To be accomplished in 6 weeks:  1.  Pt will increase FOTO score by 16   pts to improve left hip function. Goal met. Increased from 31 to 51, 20 points. 2. Pt will increase left hip abduction strength to 5/5 to ease with return to job duties as a .   Goal met. 3. Pt will demonstrate a Negative Trendelenburg Test to improve hip/glute strength to return to PLOF. Goal met  4.  Pt will report >80% improvement to return to PLOF.     Pt reports 90% overall improvement for his hip since start of care 3/2/20     PLAN  []  Upgrade activities as tolerated     [x]  Continue plan of care  []  Update interventions per flow sheet       []  Discharge due to:_  []  Other:_      Hernández Ni, PTA 3/6/2020  2:33 PM    Future Appointments   Date Time Provider Wanda Chavez   3/9/2020  9:00 AM Nela Margarets, PTA [facility]   3/11/2020 10:00 AM Noe Rivera, PT [facility]   3/13/2020  9:00 AM Nela Ades, PTA [facility]   3/16/2020  9:00 AM Nela Ades, PTA [facility]   3/18/2020 10:00 AM Noe Rivera, PT [id number]   3/19/2020  3:30 PM Fantasma Quintero MD [facility]   3/20/2020 10:00 AM Nela Ades, PTA [facility]   3/25/2020  9:30 AM Nela Ades, PTA [facility]   4/3/2020  1:40 PM KANE Sanon

## 2020-03-09 ENCOUNTER — HOSPITAL ENCOUNTER (OUTPATIENT)
Dept: PHYSICAL THERAPY | Age: 60
Discharge: HOME | End: 2020-03-09
Payer: MEDICAID

## 2020-03-09 PROCEDURE — 97110 THERAPEUTIC EXERCISES: CPT

## 2020-03-09 NOTE — PROGRESS NOTES
PT DAILY TREATMENT NOTE 10-18    Patient Name: Galilea Loge  Date:3/9/2020  : 1960  [x]  Patient  Verified  Payor: [facility] MEDICAID / Plan: [employer] [place] [employer] / Product Type: Managed Care Medicaid /    In time:900  Out time:930  Total Treatment Time (min): 30  Visit #: 1 of 4    Treatment Area: Pain in left hip [M25.552]    SUBJECTIVE  Pain Level (0-10 scale): 0/10  Any medication changes, allergies to medications, adverse drug reactions, diagnosis change, or new procedure performed?: [x] No    [] Yes (see summary sheet for update)  Subjective functional status/changes:   [] No changes reported  Pt stated that his hip is doing well and he has no pain    OBJECTIVE    30 min Therapeutic Exercise:  [x] See flow sheet :   Rationale: increase ROM and increase strength to improve the patients ability to increase ease with ADLs    With   [x] TE   [] TA   [] neuro   [] other: Patient Education: [x] Review HEP    [] Progressed/Changed HEP based on:   [] positioning   [] body mechanics   [] transfers   [] heat/ice application    [] other:      Other Objective/Functional Measures:   Had no difficulty with exercises  No complaint of pain during session     Pain Level (0-10 scale) post treatment: 0/10    ASSESSMENT/Changes in Function:   Pt is progressing well with therapy and is to be discharged in 3 visits    Patient will continue to benefit from skilled PT services to modify and progress therapeutic interventions, address functional mobility deficits, address ROM deficits, address strength deficits, analyze and modify body mechanics/ergonomics and instruct in home and community integration to attain remaining goals. []  See Plan of Care  [x]  See progress note/recertification  []  See Discharge Summary         Progress towards goals / Updated goals:  1. Patient will be issued final HEP and will show understanding of all exercises to maintain progress made in therapy.     PLAN  []  Upgrade activities as tolerated     [x]  Continue plan of care  []  Update interventions per flow sheet       []  Discharge due to:_  []  Other:_      Argelia Mercer PTA 3/9/2020  9:00 AM    Future Appointments   Date Time Provider Wanda Chavez   3/11/2020 10:00 AM Daniella Suarez, PT MMCPTPB [facility]   3/16/2020  9:00 AM Adolm Blizzard, PTA [facility]CP[facility]   3/18/2020 10:00 AM Daniella Suarez, PT [id number] [facility]   3/19/2020  3:30 PM Priscilla Angel MD [facility]   3/25/2020  9:30 AM Adolm Blizzard, PTA MMCPTPB [facility]   4/3/2020  1:40 PM KANE Stewart [address] 70

## 2020-03-11 ENCOUNTER — HOSPITAL ENCOUNTER (OUTPATIENT)
Dept: PHYSICAL THERAPY | Age: 60
Discharge: HOME | End: 2020-03-11
Payer: MEDICAID

## 2020-03-11 PROCEDURE — 97110 THERAPEUTIC EXERCISES: CPT

## 2020-03-11 NOTE — PROGRESS NOTES
PT DAILY TREATMENT NOTE - [facility]     Patient Name: Trever Torres  Date:3/11/2020  : 1960  [x]  Patient  Verified  Payor: [facility] MEDICAID / Plan: [employer] [employer] / Product Type: Managed Care Medicaid /    In time:1015  Out time:1045  Total Treatment Time (min): 30  Visit #: 2 of 4    Treatment Area: Pain in left hip [M25.552]    SUBJECTIVE  Pain Level (0-10 scale): 0/10  Any medication changes, allergies to medications, adverse drug reactions, diagnosis change, or new procedure performed?: [x] No    [] Yes (see summary sheet for update)  Subjective functional status/changes:   [] No changes reported  Sometimes he has pain, it comes and goes. 2 more visits and DC. Pt late. .. OBJECTIVE    30 min Therapeutic Exercise:  [] See flow sheet :   Rationale: increase ROM, increase strength, improve coordination and improve balance to improve the patients ability to ease with ADL's       With   [] TE   [] TA   [] neuro   [] other: Patient Education: [x] Review HEP    [] Progressed/Changed HEP based on:   [] positioning   [] body mechanics   [] transfers   [] heat/ice application    [] other:      Other Objective/Functional Measures: Challenged with balance on BOSU during  squats. Increased weight on machines to tolerance and without difficulty. No pain reported during or after session. Pain Level (0-10 scale) post treatment: 0/10    ASSESSMENT/Changes in Function: Progressing well witthf hip strengthening. No pain reported during activities. Progress towards DC to HEP. Patient will continue to benefit from skilled PT services to modify and progress therapeutic interventions, address functional mobility deficits, address ROM deficits, address strength deficits, analyze and address soft tissue restrictions, analyze and cue movement patterns, analyze and modify body mechanics/ergonomics and assess and modify postural abnormalities to attain remaining goals.      [x]  See Plan of Care  [] See progress note/recertification  []  See Discharge Summary         Progress towards goals / Updated goals:  1. Patient will be issued final HEP and will show understanding of all exercises to maintain progress made in therapy.  Mandeep Sevilla in 2 visits.  3/11/20  PLAN  [x]  Upgrade activities as tolerated     [x]  Continue plan of care  []  Update interventions per flow sheet       []  Discharge due to:_  []  Other:_      Fadumo Maria, PT 3/11/2020  9:16 AM    Future Appointments   Date Time Provider Wanda Chavez   3/11/2020 10:00 AM Minoo Bourne, PT MMCPTPB [address]   3/16/2020  9:00 AM Brooklynn Barba, PTA [facility]CPTPB [address]   3/18/2020 10:00 AM Minoo Bourne, PT [id number] [address]   3/19/2020  3:30 PM Rachel Barahona MD [facility]   3/25/2020  9:30 AM Brooklynn Barba, ROBERTO MMCPTPB [address]   4/3/2020  1:40 PM KANE Daley [address] 70

## 2020-03-13 ENCOUNTER — APPOINTMENT (OUTPATIENT)
Dept: PHYSICAL THERAPY | Age: 60
End: 2020-03-13
Payer: MEDICAID

## 2020-03-16 ENCOUNTER — APPOINTMENT (OUTPATIENT)
Dept: PHYSICAL THERAPY | Age: 60
End: 2020-03-16
Payer: MEDICAID

## 2020-03-18 ENCOUNTER — HOSPITAL ENCOUNTER (OUTPATIENT)
Dept: PHYSICAL THERAPY | Age: 60
Discharge: HOME | End: 2020-03-18
Payer: MEDICAID

## 2020-03-18 DIAGNOSIS — Z96.642 HISTORY OF LEFT HIP REPLACEMENT: Primary | ICD-10-CM

## 2020-03-18 PROCEDURE — 97110 THERAPEUTIC EXERCISES: CPT

## 2020-03-18 RX ORDER — OXYCODONE AND ACETAMINOPHEN 7.5; 325 MG/1; MG/1
1-2 TABLET ORAL
Qty: 42 TAB | Refills: 0 | Status: CANCELLED | OUTPATIENT
Start: 2020-03-18 | End: 2020-03-25

## 2020-03-18 RX ORDER — HYDROCODONE BITARTRATE AND ACETAMINOPHEN 7.5; 325 MG/1; MG/1
1-2 TABLET ORAL
Qty: 42 TAB | Refills: 0 | Status: SHIPPED | OUTPATIENT
Start: 2020-03-18 | End: 2020-03-25

## 2020-03-18 NOTE — PROGRESS NOTES
PT DAILY TREATMENT NOTE 10-18    Patient Name: Paulo Leroy  Date:3/18/2020  : 1960  [x]  Patient  Verified  Payor: [facility] MEDICAID / Plan: [employer] [place] [employer] / Product Type: Managed Care Medicaid /    In time:957  Out time:1037  Total Treatment Time (min): 40  Visit #: 3 of 4    Treatment Area: Pain in left hip [M25.552]    SUBJECTIVE  Pain Level (0-10 scale): 0/10  Any medication changes, allergies to medications, adverse drug reactions, diagnosis change, or new procedure performed?: [x] No    [] Yes (see summary sheet for update)  Subjective functional status/changes:   [] No changes reported  1 more visit and DC. Reports he has to take care of his knees now. OBJECTIVE      40 min Therapeutic Exercise:  [] See flow sheet :   Rationale: increase ROM, increase strength, improve coordination and improve balance to improve the patients ability to ease with ADL's          With   [] TE   [] TA   [] neuro   [] other: Patient Education: [x] Review HEP    [] Progressed/Changed HEP based on:   [] positioning   [] body mechanics   [] transfers   [] heat/ice application    [] other:      Other Objective/Functional Measures: Hip ABD MMT=4/5   SL ABd with 4# above knee  SLR with 4# x 15 with some soreness. Pain Level (0-10 scale) post treatment: 0/10    ASSESSMENT/Changes in Function: Progressed to increased weights today with good tolerance. Patient will continue to benefit from skilled PT services to modify and progress therapeutic interventions, address functional mobility deficits, address ROM deficits, address strength deficits, analyze and address soft tissue restrictions, analyze and cue movement patterns, analyze and modify body mechanics/ergonomics, assess and modify postural abnormalities and address imbalance/dizziness to attain remaining goals.      []  See Plan of Care  [x]  See progress note/recertification  []  See Discharge Summary         Progress towards goals / Updated goals: 1. Patient will be issued final HEP and will show understanding of all exercises to maintain progress made in therapy.  Josefinae 41 in 2 visits. 3/11/20  DC in 1 visit.  3/18/20  PLAN  [x]  Upgrade activities as tolerated     [x]  Continue plan of care  []  Update interventions per flow sheet       []  Discharge due to:_  []  Other:_      Dutch Stein, PT 3/18/2020  10:10 AM    Future Appointments   Date Time Provider Wanda Chavez   3/19/2020  3:30 PM Maria Victoria Paulino MD [facility]   3/25/2020  9:30 AM Jossy Officer, PTA [facility]CP[facility]   4/3/2020  1:40 PM KANE Yuan [address] 70

## 2020-03-18 NOTE — TELEPHONE ENCOUNTER
Spoke with patient to notify him Rx has been sent to his pharmacy. Patient states his pharmacy noticed that we have his  wrong, it is 1960 not 1960. He asked if this would make a difference in him being able to get his Rx, notified him it may but we have 2 systems and it will not automatically update in CC so there is nothing we can do until that has changed. Patient aware Willis Moe is not in the office until tomorrow. He verbalized understanding.

## 2020-03-18 NOTE — TELEPHONE ENCOUNTER
Last Visit: 2/28/20 with SUNITHA Jameson  Next Appointment: 4/3/20 with SUNITHA Jameson  Previous Refill Encounter(s): 2/27/20 #42    Requested Prescriptions     Pending Prescriptions Disp Refills    oxyCODONE-acetaminophen (PERCOCET 7.5) 7.5-325 mg per tablet 42 Tab 0     Sig: Take 1-2 Tabs by mouth every eight (8) hours as needed for Pain for up to 7 days. Max Daily Amount: 6 Tabs.

## 2020-03-20 ENCOUNTER — TELEPHONE (OUTPATIENT)
Dept: ORTHOPEDIC SURGERY | Facility: CLINIC | Age: 60
End: 2020-03-20

## 2020-03-20 ENCOUNTER — APPOINTMENT (OUTPATIENT)
Dept: PHYSICAL THERAPY | Age: 60
End: 2020-03-20
Payer: MEDICAID

## 2020-03-20 NOTE — TELEPHONE ENCOUNTER
Attempted to start this through Cover My Meds but received this message:    Gricel Bryan Morris: Casey Au - Rx #: [id number] Need help? Call us at [phone]   Outcome   Additional Information Required   An electronic prior authorization case cannot be initiated for this member as the plan or drug is not supported. Please contact [employer] [address] at [phone] if you have questions. Information regarding your request   An electronic prior authorization case cannot be initiated for this member as the plan or drug is not supported. Please contact [employer] [address] at [phone] if you have questions.

## 2020-03-23 NOTE — TELEPHONE ENCOUNTER
Form forwarded to Lela Mccain for [facility]. From there, the form will then be faxed to the plan where we await approval. This process usually takes up to 5 business days.

## 2020-03-23 NOTE — TELEPHONE ENCOUNTER
Form faxed to [employer] [phone], confirmation received. Awaiting response from insurance company.

## 2020-03-23 NOTE — TELEPHONE ENCOUNTER
I have the completed prior auth form in my possession, however it requires the physician's signature. Please let me know who this form is to be forwarded to for signature.

## 2020-03-25 ENCOUNTER — APPOINTMENT (OUTPATIENT)
Dept: PHYSICAL THERAPY | Age: 60
End: 2020-03-25
Payer: MEDICAID

## 2020-03-25 NOTE — TELEPHONE ENCOUNTER
PA denied  \"Request does not meet the established medical necessity criteria or guidelines at this time. This medicine is given to patients when their doctor attests to the following statement: A treatment plan with goals that addresses benefits and harm has been established with the patient.  The facts given do not show the doctor attested to this\"

## 2020-04-03 ENCOUNTER — OFFICE VISIT (OUTPATIENT)
Dept: ORTHOPEDIC SURGERY | Age: 60
End: 2020-04-03

## 2020-04-03 VITALS
RESPIRATION RATE: 15 BRPM | DIASTOLIC BLOOD PRESSURE: 99 MMHG | HEART RATE: 76 BPM | OXYGEN SATURATION: 97 % | HEIGHT: 73 IN | TEMPERATURE: 98 F | WEIGHT: 184 LBS | BODY MASS INDEX: 24.39 KG/M2 | SYSTOLIC BLOOD PRESSURE: 132 MMHG

## 2020-04-09 NOTE — PROGRESS NOTES
[facility] [employer] [facility] KAYLEE  [address] [place] [address]  [phone] fax    Physical Therapy Discharge Summary    Patient name: Sugar Torres Start of Care: 2020   Referral source: Jose A Brennan : 1960   Medical/Treatment Diagnosis: Pain in left hip [M25.552]  Payor: [facility] MEDICAID / Plan: [address] [facility] / Product Type: Managed Care Medicaid /  Onset Date:2020     Prior Hospitalization: see medical history Provider#: [id number]   Medications: Verified on Patient Summary List    Comorbidities: Arthritis, HTN, Back Pain. Prior Level of Function:Pt works as a . Want to get back to Natanael Ulien activities. Likes to go  Dancing. Visits from Start of Care: 10    Missed Visits: 5    Reporting Period : 3/6/2020 to 3/18/2020    Summary of Care:  Scot Enter will be issued final HEP and will show understanding of all exercises to maintain progress made in therapy. Status at last note/certification:Initiated  Status at discharge: met      ASSESSMENT/RECOMMENDATIONS: Mr. Juan Judd progressed in therapy meeting his final goal while on hold from PT. He averaged 0/10 pain in his 4 final sessions and did not complete his final D/C session due to the COVID-19 condensed scheduling. He had 4/5 hip abduction MMT strength and was compliant with his HEP. Will D/C at this time as pt is no longer in need of skilled PT services.      [x]Discontinue therapy: [x]Patient has reached or is progressing toward set goals      []Patient is non-compliant or has abdicated      []Due to lack of appreciable progress towards set Rosi 296, PTA 2020 10:57 AM

## 2020-05-21 ENCOUNTER — APPOINTMENT (OUTPATIENT)
Dept: PHYSICAL THERAPY | Age: 60
End: 2020-05-21

## 2020-09-14 ENCOUNTER — HOSPITAL ENCOUNTER (OUTPATIENT)
Dept: PHYSICAL THERAPY | Age: 60
Discharge: HOME | End: 2020-09-14

## 2020-10-12 ENCOUNTER — HOSPITAL ENCOUNTER (OUTPATIENT)
Dept: PHYSICAL THERAPY | Age: 60
Discharge: HOME | End: 2020-10-12
Payer: MEDICAID

## 2020-10-12 PROCEDURE — 97161 PT EVAL LOW COMPLEX 20 MIN: CPT

## 2020-10-12 NOTE — PROGRESS NOTES
PT DAILY TREATMENT NOTE - [facility]     Patient Name: Lilly Valdivia  Date:10/12/2020  : 1960  [x]  Patient  Verified  Payor: [facility] MEDICAID / Plan: [employer] / Product Type: Managed Care Medicaid /    In time:1205  Out time:1245  Total Treatment Time (min): 40  Visit #: 1 of 12    Treatment Area: Low back pain [M54.5]    SUBJECTIVE  Pain Level (0-10 scale): 6/10  Any medication changes, allergies to medications, adverse drug reactions, diagnosis change, or new procedure performed?: [x] No    [] Yes (see summary sheet for update)  Subjective functional status/changes:   [] No changes reported  See eval    OBJECTIVE   []redness - adverse reaction:     25 min []Eval                  []Re-Eval       15 min Therapeutic Exercise:  [] See flow sheet :   Rationale: increase ROM and increase strength to improve the patients ability to ease with ADL's          With   [] TE   [] TA   [] neuro   [] other: Patient Education: [x] Review HEP    [] Progressed/Changed HEP based on:   [] positioning   [] body mechanics   [] transfers   [] heat/ice application    [] other:      Other Objective/Functional Measures: see eval     Pain Level (0-10 scale) post treatment: 5/10    ASSESSMENT/Changes in Function: seepo    Patient will continue to benefit from skilled PT services to modify and progress therapeutic interventions, address functional mobility deficits, address ROM deficits, address strength deficits, analyze and address soft tissue restrictions, analyze and cue movement patterns, analyze and modify body mechanics/ergonomics, assess and modify postural abnormalities and address imbalance/dizziness to attain remaining goals.      [x]  See Plan of Care  []  See progress note/recertification  []  See Discharge Summary         Progress towards goals / Updated goals:  seepoc    PLAN  [x]  Upgrade activities as tolerated     [x]  Continue plan of care  []  Update interventions per flow sheet       [] Discharge due to:_  []  Other:_      Precious Trujillo, PT 10/12/2020  9:08 AM    Future Appointments   Date Time Provider Wanda Chavez   10/12/2020 12:00 PM Sangeeta Madrigal, PT [facility]CPTP[facility]

## 2020-10-12 NOTE — PROGRESS NOTES
[facility] - [address]  [phone] fax    Plan of Care/ Statement of Necessity for Physical Therapy Services    Patient name: Tammy Torres Start of Care: 10/12/2020   Referral source: Joaquin Soto MD : 1960    Medical Diagnosis: Low back pain [M54.5]  Payor: [facility] MEDICAID / Plan: [address] [facility] / Product Type: Managed Care Medicaid /  Onset Date:>1 yr    Treatment Diagnosis: LBP   Prior Hospitalization: see medical history Provider#: [id number]   Medications: Verified on Patient summary List    Comorbidities: HBP, Arthritis, Left THR 2020   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 The Plan of Care and following information is based on the information from the initial evaluation. Assessment/ key information: Pt is a 63 yr old male with reports of ongoing LS pain. Pt reports 6/10 pain today. Pain increases during transfers, prolonged standing and prolonged sitting. Hi job duties involves refinishing furniture. Pain is increased at the end of the day especially after work. LS ROM is WFL with tightness and guarding throughout. There is TTP to L3-L5 and paraspinals. There is an observed hypolordosis in standing. He denies any radiating sx to his LE's. Pain is achy and intermittent. Pt presents with decreased core activation, decreased core strength as well. Pt will benfit from skilled therapy to improve core strength, decrease pain and improve LS mobility and flexibility to be able to perform his job duties and to improve QOL .     Evaluation Complexity History MEDIUM  Complexity : 1-2 comorbidities / personal factors will impact the outcome/ POC ; Examination MEDIUM Complexity : 3 Standardized tests and measures addressing body structure, function, activity limitation and / or participation in recreation  ;Presentation MEDIUM Complexity : Evolving with changing characteristics  ; Clinical Decision Making MEDIUM Complexity : FOTO score of 26-74  Overall Complexity Rating: LOW   Problem List: pain affecting function, decrease ROM, decrease strength, impaired gait/ balance, decrease ADL/ functional abilitiies, decrease activity tolerance, decrease flexibility/ joint mobility and decrease transfer abilities   Treatment Plan may include any combination of the following: Therapeutic exercise, Therapeutic activities, Neuromuscular re-education, Physical agent/modality, Gait/balance training, Manual therapy, Patient education, Self Care training, Functional mobility training, Home safety training and Stair training  Patient / Family readiness to learn indicated by: asking questions, trying to perform skills and interest  Persons(s) to be included in education: patient (P)  Barriers to Learning/Limitations: None  Patient Goal (s): Decrease LBP  Patient Self Reported Health Status: good  Rehabilitation Potential: good    Short Term Goals: To be accomplished in 1 weeks:   1. Pt will be compliant with a HEP to improve LS function. Long Term Goals: To be accomplished in 6 weeks:   1. Pt will increase FOTO score by 12 pts to improve LS function. 2. Pt will report pain decrease by 50% to ease with Job duties. 3. Pt will report pain <3/10 at worst while performing job duties. 4. Pt will demonstrate proper body mechanics to lift 15lbs w/o cueing  x 5 to avoid any future LS njury. Frequency / Duration: Patient to be seen 2 times per week for 6 weeks. Patient/ CarPatient/ Caregiver education and instruction: Diagnosis, prognosis, self care, activity modification and exercises   [x]  Plan of care has been reviewed with ROBERTO Begum, PT 10/12/2020 9:09 AM    ________________________________________________________________________    I certify that the above Therapy Services are being furnished while the patient is under my care.  I agree with the treatment plan and certify that this therapy is necessary.     Physician's Signature:____________Date:_________TIME:________    ** Signature, Date and Time must be completed for valid certification **    Please sign and return to [facility] [employer] [facility]  [address] [place] [address]  [phone] fax

## 2020-10-14 ENCOUNTER — HOSPITAL ENCOUNTER (OUTPATIENT)
Dept: PHYSICAL THERAPY | Age: 60
Discharge: HOME | End: 2020-10-14
Payer: MEDICAID

## 2020-10-14 PROCEDURE — 97112 NEUROMUSCULAR REEDUCATION: CPT

## 2020-10-14 PROCEDURE — 97530 THERAPEUTIC ACTIVITIES: CPT

## 2020-10-14 PROCEDURE — 97110 THERAPEUTIC EXERCISES: CPT

## 2020-10-14 NOTE — PROGRESS NOTES
PT DAILY TREATMENT NOTE 10-18    Patient Name: Heydi Torres  Date:10/14/2020  : 1960  [x]  Patient  Verified  Payor: [facility] / Plan: [facility] COMMUNITY PLAN [employer] / Product Type: Managed Care Medicaid /    In time: 9:50  Out time:10:28  Total Treatment Time (min): 38  Visit #: 2 of 12    Treatment Area: Low back pain [M54.5]    SUBJECTIVE  Pain Level (0-10 scale): 5-6  Any medication changes, allergies to medications, adverse drug reactions, diagnosis change, or new procedure performed?: [x] No    [] Yes (see summary sheet for update)  Subjective functional status/changes:   [] No changes reported  Patient reports he is feeling \"more soreness than pain. \"    OBJECTIVE    22 min Therapeutic Exercise:  [x] See flow sheet :   Rationale: increase ROM and increase strength to improve the patients ability to perform ADLs with ease    13 min Therapeutic Activity:  [x]  See flow sheet :   Rationale: increase strength, improve coordination and increase proprioception  to improve the patients ability to  perform daily tasks and return to PLOF     10 min Neuromuscular Re-education:  [x]  See flow sheet :   Rationale: improve coordination and increase proprioception  to improve the patients ability to stabilize, use proper body mechanics, and promote proper postural awareness          With   [x] TE   [x] TA   [x] neuro   [] other: Patient Education: [x] Review HEP    [] Progressed/Changed HEP based on:   [x] positioning   [x] body mechanics   [] transfers   [] heat/ice application    [] other:      Other Objective/Functional Measures:   Initiated POC   /81; 73 bpm  Reviewed HEP and a plan to incorporate them into his morning and evening routine to accommodate his busy schedule. Pain Level (0-10 scale) post treatment: 4    ASSESSMENT/Changes in Function: Patient tolerated therapy well; completed exercises demonstrating a good understanding and ability while also challenged.  Responds well to verbal cuing for form and mechanics. He reports he has the most difficulty with driving for prolonged periods and with ambulation. Will continue with skilled PT to address pt goals to reduce pain with activity and and increase mobility. Patient will continue to benefit from skilled PT services to modify and progress therapeutic interventions, address functional mobility deficits, address ROM deficits, address strength deficits, analyze and address soft tissue restrictions, analyze and cue movement patterns, analyze and modify body mechanics/ergonomics and assess and modify postural abnormalities to attain remaining goals. [x]  See Plan of Care  []  See progress note/recertification  []  See Discharge Summary         Progress towards goals / Updated goals:  Short Term Goals: To be accomplished in 1 weeks:               1. Pt will be compliant with a HEP to improve LS function. Current: pt reports he has not begun to implement HEP yet. 10/14/2020   Long Term Goals: To be accomplished in 6 weeks:               1. Pt will increase FOTO score by 12 pts to improve LS function. 2. Pt will report pain decrease by 50% to ease with Job duties. 3. Pt will report pain <3/10 at worst while performing job duties. 4. Pt will demonstrate proper body mechanics to lift 15lbs w/o cueing  x 5 to avoid any future LS injury.     PLAN  [x]  Upgrade activities as tolerated     [x]  Continue plan of care  []  Update interventions per flow sheet       []  Discharge due to:_  []  Other:_      ERIC Sanz 10/14/2020  9:53 AM    Future Appointments   Date Time Provider Wanda Chavez   10/19/2020  2:15 PM Meriam Yanez CFXITIV [facility]   10/21/2020  8:15 AM Deng Goo MMCPTPB [facility]   10/26/2020  9:45 AM Daryl Wang, PT RRLOVZO [facility]   10/28/2020  9:45 AM Deng Goo MMCPTPB [facility]   11/2/2020  9:45 AM Meriam Yanez MMCPTP[facility]   11/4/2020  9:45 AM Bethel Felton, PT QHAVEKV [facility]   11/9/2020  9:45 AM Betty Blade, PT MMCPTP[facility]   11/11/2020  9:45 AM Rui Guidry, PT NYCRVVH [facility]   11/16/2020  9:45 AM Favio Espinoza MMCPTP[facility]   11/18/2020 10:30 AM Betty White, PT MMCPTPB [facility]   11/23/2020  9:45 AM Favio ELDERKTQY [facility]   11/25/2020  9:45 AM Betty White, PT MMCPTPB [facility]

## 2020-10-19 ENCOUNTER — HOSPITAL ENCOUNTER (EMERGENCY)
Age: 60
Discharge: ANOTHER HOSPITAL | End: 2020-10-20
Attending: EMERGENCY MEDICINE | Admitting: EMERGENCY MEDICINE
Payer: MEDICAID

## 2020-10-19 ENCOUNTER — HOSPITAL ENCOUNTER (OUTPATIENT)
Dept: PHYSICAL THERAPY | Age: 60
Discharge: HOME | End: 2020-10-19
Payer: MEDICAID

## 2020-10-19 ENCOUNTER — APPOINTMENT (OUTPATIENT)
Dept: GENERAL RADIOLOGY | Age: 60
End: 2020-10-19
Attending: EMERGENCY MEDICINE
Payer: MEDICAID

## 2020-10-19 DIAGNOSIS — R57.8 HEMORRHAGIC SHOCK (HCC): Primary | ICD-10-CM

## 2020-10-19 DIAGNOSIS — R55 SYNCOPE AND COLLAPSE: ICD-10-CM

## 2020-10-19 DIAGNOSIS — S61.213A LACERATION OF LEFT MIDDLE FINGER WITHOUT FOREIGN BODY, NAIL DAMAGE STATUS UNSPECIFIED, INITIAL ENCOUNTER: ICD-10-CM

## 2020-10-19 LAB
ANION GAP SERPL CALC-SCNC: 5 MMOL/L (ref 3–18)
BASOPHILS # BLD: 0 K/UL (ref 0–0.1)
BASOPHILS NFR BLD: 0 % (ref 0–2)
BUN SERPL-MCNC: 11 MG/DL (ref 7–18)
BUN/CREAT SERPL: 9 (ref 12–20)
CALCIUM SERPL-MCNC: 8.9 MG/DL (ref 8.5–10.1)
CHLORIDE SERPL-SCNC: 109 MMOL/L (ref 100–111)
CO2 SERPL-SCNC: 30 MMOL/L (ref 21–32)
CREAT SERPL-MCNC: 1.28 MG/DL (ref 0.6–1.3)
DIFFERENTIAL METHOD BLD: ABNORMAL
EOSINOPHIL # BLD: 0.1 K/UL (ref 0–0.4)
EOSINOPHIL NFR BLD: 2 % (ref 0–5)
ERYTHROCYTE [DISTWIDTH] IN BLOOD BY AUTOMATED COUNT: 14.3 % (ref 11.6–14.5)
GLUCOSE SERPL-MCNC: 116 MG/DL (ref 74–99)
HCT VFR BLD AUTO: 30 % (ref 36–48)
HGB BLD-MCNC: 9.8 G/DL (ref 13–16)
INR PPP: 1 (ref 0.8–1.2)
LYMPHOCYTES # BLD: 3.7 K/UL (ref 0.9–3.6)
LYMPHOCYTES NFR BLD: 45 % (ref 21–52)
MCH RBC QN AUTO: 22.2 PG (ref 24–34)
MCHC RBC AUTO-ENTMCNC: 32.7 G/DL (ref 31–37)
MCV RBC AUTO: 68 FL (ref 74–97)
MONOCYTES # BLD: 0.7 K/UL (ref 0.05–1.2)
MONOCYTES NFR BLD: 8 % (ref 3–10)
NEUTS SEG # BLD: 3.8 K/UL (ref 1.8–8)
NEUTS SEG NFR BLD: 45 % (ref 40–73)
PLATELET # BLD AUTO: 306 K/UL (ref 135–420)
PMV BLD AUTO: 8.7 FL (ref 9.2–11.8)
POTASSIUM SERPL-SCNC: 3.6 MMOL/L (ref 3.5–5.5)
PROTHROMBIN TIME: 13 SEC (ref 11.5–15.2)
RBC # BLD AUTO: 4.41 M/UL (ref 4.7–5.5)
SODIUM SERPL-SCNC: 144 MMOL/L (ref 136–145)
WBC # BLD AUTO: 8.3 K/UL (ref 4.6–13.2)

## 2020-10-19 PROCEDURE — 97112 NEUROMUSCULAR REEDUCATION: CPT

## 2020-10-19 PROCEDURE — 36430 TRANSFUSION BLD/BLD COMPNT: CPT

## 2020-10-19 PROCEDURE — 86920 COMPATIBILITY TEST SPIN: CPT

## 2020-10-19 PROCEDURE — 85610 PROTHROMBIN TIME: CPT

## 2020-10-19 PROCEDURE — 86900 BLOOD TYPING SEROLOGIC ABO: CPT

## 2020-10-19 PROCEDURE — 90471 IMMUNIZATION ADMIN: CPT

## 2020-10-19 PROCEDURE — 97110 THERAPEUTIC EXERCISES: CPT

## 2020-10-19 PROCEDURE — 96365 THER/PROPH/DIAG IV INF INIT: CPT

## 2020-10-19 PROCEDURE — 96376 TX/PRO/DX INJ SAME DRUG ADON: CPT

## 2020-10-19 PROCEDURE — 97530 THERAPEUTIC ACTIVITIES: CPT

## 2020-10-19 PROCEDURE — 96375 TX/PRO/DX INJ NEW DRUG ADDON: CPT

## 2020-10-19 PROCEDURE — P9016 RBC LEUKOCYTES REDUCED: HCPCS

## 2020-10-19 PROCEDURE — 73130 X-RAY EXAM OF HAND: CPT

## 2020-10-19 PROCEDURE — 80048 BASIC METABOLIC PNL TOTAL CA: CPT

## 2020-10-19 PROCEDURE — 85025 COMPLETE CBC W/AUTO DIFF WBC: CPT

## 2020-10-19 PROCEDURE — 75810000294 HC INTERM/LAYERED WND RPR

## 2020-10-19 PROCEDURE — 74011250636 HC RX REV CODE- 250/636

## 2020-10-19 PROCEDURE — 75810000293 HC SIMP/SUPERF WND  RPR

## 2020-10-19 PROCEDURE — 99284 EMERGENCY DEPT VISIT MOD MDM: CPT

## 2020-10-19 RX ORDER — SODIUM CHLORIDE 9 MG/ML
250 INJECTION, SOLUTION INTRAVENOUS AS NEEDED
Status: DISCONTINUED | OUTPATIENT
Start: 2020-10-19 | End: 2020-10-20 | Stop reason: HOSPADM

## 2020-10-19 RX ORDER — HYDROMORPHONE HYDROCHLORIDE 1 MG/ML
1 INJECTION, SOLUTION INTRAMUSCULAR; INTRAVENOUS; SUBCUTANEOUS ONCE
Status: COMPLETED | OUTPATIENT
Start: 2020-10-19 | End: 2020-10-19

## 2020-10-19 RX ORDER — HYDROMORPHONE HYDROCHLORIDE 1 MG/ML
INJECTION, SOLUTION INTRAMUSCULAR; INTRAVENOUS; SUBCUTANEOUS
Status: COMPLETED
Start: 2020-10-19 | End: 2020-10-19

## 2020-10-19 RX ADMIN — HYDROMORPHONE HYDROCHLORIDE 1 MG: 1 INJECTION, SOLUTION INTRAMUSCULAR; INTRAVENOUS; SUBCUTANEOUS at 22:37

## 2020-10-19 NOTE — PROGRESS NOTES
PT DAILY TREATMENT NOTE 10-18    Patient Name: Kenny Hammond  Date:10/19/2020  : 1960  [x]  Patient  Verified  Payor: Julissa Jeronimo / Plan: [facility] [employer] PLAN [employer]P / Product Type: Managed Care Medicaid /    In time: 2:25  Out time: 3:01  Total Treatment Time (min): 36  Visit #: 3 of 12    Treatment Area: Low back pain [M54.5]    SUBJECTIVE  Pain Level (0-10 scale): 5/10  Any medication changes, allergies to medications, adverse drug reactions, diagnosis change, or new procedure performed?: [x] No    [] Yes (see summary sheet for update)  Subjective functional status/changes:   [] No changes reported  \"I was ok over the weekend, I didn't get to do as much as I wanted to do because I wanted to rest my back some. \"    OBJECTIVE    20 min Therapeutic Exercise:  [x] See flow sheet :   Rationale: increase ROM and increase strength to improve the patients ability to perform ADLs with ease    8 Min Therapeutic Activity:  [x]  See flow sheet :   Rationale: increase strength and improve coordination  to improve the patients ability to  perform daily tasks and return to PLOF     10 min Neuromuscular Re-education:  [x]  See flow sheet :   Rationale: improve coordination and increase proprioception  to improve the patients ability to stabilize, use proper body mechanics, and promote proper postural awareness        With   [x] TE   [x] TA   [x] neuro   [] other: Patient Education: [x] Review HEP    [] Progressed/Changed HEP based on:   [x] positioning   [x] body mechanics   [] transfers   [] heat/ice application    [] other:      Other Objective/Functional Measures:   Modified piriformis stretch d/t decreased ROM from right NEREIDA  HS and hip flexor stretch on plinth in long sit (HS) and supine (flexor)     Pain Level (0-10 scale) post treatment: 0    ASSESSMENT/Changes in Function: Patient is making slow progress towards goals with report of decreased s/s post treatment.  He continues to have pain with daily activity, however is able to tolerate with some decrease d/t fatigue. Core bracing is improving and continued skilled therapy will benefit patient's progress towards goals to improve QOL with work and daily activity. Patient will continue to benefit from skilled PT services to modify and progress therapeutic interventions, address functional mobility deficits, address ROM deficits, address strength deficits, analyze and address soft tissue restrictions, analyze and cue movement patterns, analyze and modify body mechanics/ergonomics and assess and modify postural abnormalities to attain remaining goals. [x]  See Plan of Care  []  See progress note/recertification  []  See Discharge Summary         Progress towards goals / Updated goals:  Short Term Goals: To be accomplished in 1 weeks:               1. Pt will be compliant with a HEP to improve LS function. Current: pt reports he has not begun to implement HEP yet. 10/14/2020   Long Term Goals: To be accomplished in 6 weeks:               1. Pt will increase FOTO score by 12 pts to improve LS function.                2. Pt will report pain decrease by 50% to ease with Job duties.              3. Pt will report pain <3/10 at worst while performing job duties.                 4. Pt will demonstrate proper body mechanics to lift 15lbs w/o cueing  x 5 to avoid any future LS injury.       PLAN  [x]  Upgrade activities as tolerated     [x]  Continue plan of care  []  Update interventions per flow sheet       []  Discharge due to:_  []  Other:_      Shelton Canavan, LPTA 10/19/2020  2:34 PM    Future Appointments   Date Time Provider Wanda Chavez   10/21/2020  8:15 AM Samul Patella MMCPTPB [facility]   10/26/2020  9:45 AM Marquise Buckner PT [id number] [facility]   10/28/2020  9:45 AM Samul Patella MMCPTPB [facility]   11/2/2020  9:45 AM Veatrice Mouse [id number] [facility]   11/4/2020  9:45 AM Mitch Greenwood PT MMCPTPB [facility]   11/9/2020  9:45 AM Marquise Buckner PT ONIHMOR [facility]   11/11/2020  9:45 AM Eduardo Field, PT MMCPTPB [facility]   11/16/2020  9:45 AM Rui Shaw MMCPTPB [facility]   11/18/2020 10:30 AM Dru Georges, PT MMCPTPB [facility]   11/23/2020  9:45 AM Rui Shaw RCRVLFK [facility]   11/25/2020  9:45 AM Dru Georges, PT MMCPTPB [facility]

## 2020-10-20 VITALS
BODY MASS INDEX: 24.39 KG/M2 | TEMPERATURE: 97.9 F | DIASTOLIC BLOOD PRESSURE: 78 MMHG | OXYGEN SATURATION: 99 % | WEIGHT: 184 LBS | SYSTOLIC BLOOD PRESSURE: 144 MMHG | RESPIRATION RATE: 20 BRPM | HEIGHT: 73 IN | HEART RATE: 59 BPM

## 2020-10-20 LAB
ABO + RH BLD: NORMAL
BLD PROD TYP BPU: NORMAL
BLD PROD TYP BPU: NORMAL
BLOOD GROUP ANTIBODIES SERPL: NORMAL
BPU ID: NORMAL
BPU ID: NORMAL
CROSSMATCH RESULT,%XM: NORMAL
CROSSMATCH RESULT,%XM: NORMAL
HCT VFR BLD AUTO: 33.8 % (ref 36–48)
HGB BLD-MCNC: 11.1 G/DL (ref 13–16)
SPECIMEN EXP DATE BLD: NORMAL
STATUS OF UNIT,%ST: NORMAL
STATUS OF UNIT,%ST: NORMAL
UNIT DIVISION, %UDIV: 0
UNIT DIVISION, %UDIV: 0

## 2020-10-20 PROCEDURE — 85018 HEMOGLOBIN: CPT

## 2020-10-20 PROCEDURE — 74011000250 HC RX REV CODE- 250

## 2020-10-20 PROCEDURE — 90715 TDAP VACCINE 7 YRS/> IM: CPT | Performed by: EMERGENCY MEDICINE

## 2020-10-20 PROCEDURE — 96365 THER/PROPH/DIAG IV INF INIT: CPT

## 2020-10-20 PROCEDURE — 74011250636 HC RX REV CODE- 250/636: Performed by: EMERGENCY MEDICINE

## 2020-10-20 PROCEDURE — 74011250636 HC RX REV CODE- 250/636

## 2020-10-20 PROCEDURE — 96376 TX/PRO/DX INJ SAME DRUG ADON: CPT

## 2020-10-20 PROCEDURE — 90471 IMMUNIZATION ADMIN: CPT

## 2020-10-20 PROCEDURE — 74011000258 HC RX REV CODE- 258: Performed by: EMERGENCY MEDICINE

## 2020-10-20 PROCEDURE — 75810000294 HC INTERM/LAYERED WND RPR

## 2020-10-20 PROCEDURE — 75810000293 HC SIMP/SUPERF WND  RPR

## 2020-10-20 RX ORDER — HYDROMORPHONE HYDROCHLORIDE 1 MG/ML
1 INJECTION, SOLUTION INTRAMUSCULAR; INTRAVENOUS; SUBCUTANEOUS ONCE
Status: COMPLETED | OUTPATIENT
Start: 2020-10-20 | End: 2020-10-20

## 2020-10-20 RX ORDER — HYDROMORPHONE HYDROCHLORIDE 2 MG/ML
2 INJECTION, SOLUTION INTRAMUSCULAR; INTRAVENOUS; SUBCUTANEOUS
Status: COMPLETED | OUTPATIENT
Start: 2020-10-20 | End: 2020-10-20

## 2020-10-20 RX ORDER — HYDROMORPHONE HYDROCHLORIDE 1 MG/ML
INJECTION, SOLUTION INTRAMUSCULAR; INTRAVENOUS; SUBCUTANEOUS
Status: DISCONTINUED
Start: 2020-10-20 | End: 2020-10-20 | Stop reason: HOSPADM

## 2020-10-20 RX ORDER — LIDOCAINE HYDROCHLORIDE 20 MG/ML
INJECTION, SOLUTION INFILTRATION; PERINEURAL
Status: COMPLETED
Start: 2020-10-20 | End: 2020-10-20

## 2020-10-20 RX ORDER — LIDOCAINE HYDROCHLORIDE 20 MG/ML
0.3 INJECTION, SOLUTION INFILTRATION; PERINEURAL ONCE
Status: COMPLETED | OUTPATIENT
Start: 2020-10-20 | End: 2020-10-20

## 2020-10-20 RX ORDER — HYDROMORPHONE HYDROCHLORIDE 2 MG/ML
INJECTION, SOLUTION INTRAMUSCULAR; INTRAVENOUS; SUBCUTANEOUS
Status: COMPLETED
Start: 2020-10-20 | End: 2020-10-20

## 2020-10-20 RX ADMIN — HYDROMORPHONE HYDROCHLORIDE 1 MG: 1 INJECTION, SOLUTION INTRAMUSCULAR; INTRAVENOUS; SUBCUTANEOUS at 05:25

## 2020-10-20 RX ADMIN — TETANUS TOXOID, REDUCED DIPHTHERIA TOXOID AND ACELLULAR PERTUSSIS VACCINE, ADSORBED 0.5 ML: 5; 2.5; 8; 8; 2.5 SUSPENSION INTRAMUSCULAR at 03:00

## 2020-10-20 RX ADMIN — LIDOCAINE HYDROCHLORIDE 6 MG: 20 INJECTION, SOLUTION INFILTRATION; PERINEURAL at 03:03

## 2020-10-20 RX ADMIN — PIPERACILLIN AND TAZOBACTAM 3.38 G: 3; .375 INJECTION, POWDER, LYOPHILIZED, FOR SOLUTION INTRAVENOUS at 01:42

## 2020-10-20 RX ADMIN — HYDROMORPHONE HYDROCHLORIDE 2 MG: 2 INJECTION, SOLUTION INTRAMUSCULAR; INTRAVENOUS; SUBCUTANEOUS at 03:02

## 2020-10-20 RX ADMIN — HYDROMORPHONE HYDROCHLORIDE 1 MG: 1 INJECTION, SOLUTION INTRAMUSCULAR; INTRAVENOUS; SUBCUTANEOUS at 01:08

## 2020-10-20 NOTE — ED PROVIDER NOTES
EMERGENCY DEPARTMENT HISTORY AND PHYSICAL EXAM      Date: 10/19/2020  Patient Name: Damian Torres    History of Presenting Illness     Chief Complaint   Patient presents with    Laceration       History (Context): Shelly Burciaga is a 63 y.o. gentleman with a complicated set of comorbid conditions noted below who presents with acute onset, severe, persistent bleeding and pain in the area of his left middle finger and left ring finger after cutting them on a table saw. The patient lost a lot of blood at the scene. The patient drove himself to the hospital.  On arrival to the hospital, the patient collapsed with loss of consciousness. The patient endorses associated lightheadedness. On review of systems, the patient denies fever, chills, rashes, nausea, numbness    PCP: Geo Ocampo MD    Current Facility-Administered Medications   Medication Dose Route Frequency Provider Last Rate Last Dose    HYDROmorphone (DILAUDID) 1 mg/mL injection             0.9% sodium chloride infusion 250 mL  250 mL IntraVENous PRN Ирина Donohue MD         Current Outpatient Medications   Medication Sig Dispense Refill    aspirin (ASPIRIN) 325 mg tablet Take 1 Tab by mouth two (2) times a day. 60 Tab 0    cephALEXin (KEFLEX) 500 mg capsule Take 1 Cap by mouth four (4) times daily. 8 Cap 0    atorvastatin (LIPITOR) 10 mg tablet Take 10 mg by mouth daily.  amLODIPine (NORVASC) 10 mg tablet Take 10 mg by mouth daily.  gabapentin (NEURONTIN) 100 mg capsule Take  by mouth nightly.  cyclobenzaprine (FLEXERIL) 10 mg tablet Take  by mouth three (3) times daily as needed for Muscle Spasm(s).          Past History     Past Medical History:  Past Medical History:   Diagnosis Date    Anemia     Arthritis     Bilateral sciatica     Chronic pain     Herniated intervertebral disc of lumbar spine     High cholesterol     Hypertension     Muscle spasm of back        Past Surgical History:  Past Surgical History: Procedure Laterality Date    COLONOSCOPY N/A 1/10/2020    PREVENTATIVE COLONOSCOPY performed by Henrietta [facility] at [facility] ENDOSCOPY    HX ORTHOPAEDIC Left     hand laceration & ligament repair       Family History:  Family History   Problem Relation Age of Onset    Hypertension Mother     Cancer Mother     Cancer Father     Diabetes Father        Social History:  Social History     Tobacco Use    Smoking status: Former Smoker     Types: Cigarettes     Last attempt to quit: 2016     Years since quittin.8    Smokeless tobacco: Never Used   Substance Use Topics    Alcohol use: No    Drug use: Not on file       Allergies:  No Known Allergies    PMH, PSH, family history, social history, allergies reviewed with the patient with significant items noted above. Review of Systems   As per HPI, otherwise reviewed and negative. Physical Exam     Vitals:    10/20/20 0515 10/20/20 0530 10/20/20 0545 10/20/20 0600   BP: 129/84 131/88 122/84 (!) 144/78   Pulse: (!) 54 (!) 54 63 (!) 59   Resp:  20   Temp:       SpO2: 99% 99% 99% 99%   Weight:       Height:           Gen: Ill-appearing  HEENT: Normocephalic, sclera anicteric, conjunctival pallor  Cardiovascular: Normal rate, regular rhythm, no murmurs, rubs, gallops. Pulses intact and equal distally. Pulmonary: No respiratory distress. No stridor. Clear lungs. ABD: Soft, nontender, nondistended. Neuro: Alert. Normal speech. Normal mentation. Psych: Normal thought content and thought processes. : No CVA tenderness  EXT: Left hand:                Full capillary refill in the distal finger. Disruption of the extensor tendon. Moves all extremities well. No cyanosis or clubbing. Skin: Warm and well-perfused.   Other:        Diagnostic Study Results     Labs -     Recent Results (from the past 12 hour(s))   CBC WITH AUTOMATED DIFF    Collection Time: 10/19/20 10:06 PM   Result Value Ref Range    WBC 8.3 4.6 - 13.2 K/uL    RBC 4.41 (L) 4.70 - 5.50 M/uL    HGB 9.8 (L) 13.0 - 16.0 g/dL    HCT 30.0 (L) 36.0 - 48.0 %    MCV 68.0 (L) 74.0 - 97.0 FL    MCH 22.2 (L) 24.0 - 34.0 PG    MCHC 32.7 31.0 - 37.0 g/dL    RDW 14.3 11.6 - 14.5 %    PLATELET 506 059 - 563 K/uL    MPV 8.7 (L) 9.2 - 11.8 FL    NEUTROPHILS 45 40 - 73 %    LYMPHOCYTES 45 21 - 52 %    MONOCYTES 8 3 - 10 %    EOSINOPHILS 2 0 - 5 %    BASOPHILS 0 0 - 2 %    ABS. NEUTROPHILS 3.8 1.8 - 8.0 K/UL    ABS. LYMPHOCYTES 3.7 (H) 0.9 - 3.6 K/UL    ABS. MONOCYTES 0.7 0.05 - 1.2 K/UL    ABS. EOSINOPHILS 0.1 0.0 - 0.4 K/UL    ABS.  BASOPHILS 0.0 0.0 - 0.1 K/UL    DF AUTOMATED     PROTHROMBIN TIME + INR    Collection Time: 10/19/20 10:06 PM   Result Value Ref Range    Prothrombin time 13.0 11.5 - 15.2 sec    INR 1.0 0.8 - 1.2     METABOLIC PANEL, BASIC    Collection Time: 10/19/20 10:06 PM   Result Value Ref Range    Sodium 144 136 - 145 mmol/L    Potassium 3.6 3.5 - 5.5 mmol/L    Chloride 109 100 - 111 mmol/L    CO2 30 21 - 32 mmol/L    Anion gap 5 3.0 - 18 mmol/L    Glucose 116 (H) 74 - 99 mg/dL    BUN 11 7.0 - 18 MG/DL    Creatinine 1.28 0.6 - 1.3 MG/DL    BUN/Creatinine ratio 9 (L) 12 - 20      GFR est AA >60 >60 ml/min/1.73m2    GFR est non-AA 58 (L) >60 ml/min/1.73m2    Calcium 8.9 8.5 - 10.1 MG/DL   EMERGENT RELEASE OF UNCROSSMATCHED RED CELLS    Collection Time: 10/19/20 10:15 PM   Result Value Ref Range    Crossmatch Expiration 10/22/2020     ABO/Rh(D) B POSITIVE     Antibody screen NEG     Unit number [id number]     Blood component type RC LR,2     Unit division 00     Status of unit ISSUED     Crossmatch result Compatible     Unit number [id number]     Blood component type RC LR,2     Unit division 00     Status of unit ISSUED     Crossmatch result Compatible    HGB & HCT    Collection Time: 10/20/20  1:17 AM   Result Value Ref Range    HGB 11.1 (L) 13.0 - 16.0 g/dL    HCT 33.8 (L) 36.0 - 48.0 %       Radiologic Studies -   XR HAND LT MIN 3 V    (Results Pending)     CT Results (Last 48 hours)    None        CXR Results  (Last 48 hours)    None            Medical Decision Making   I am the first provider for this patient. I reviewed the vital signs, available nursing notes, past medical history, past surgical history, family history and social history. Vital Signs-Reviewed the patient's vital signs. EKG: Interpreted by myself. Records Reviewed: Personally, on initial evaluation    MDM:   Patient presents with finger laceration on left middle and ring fingers from a table saw. The patient is hypotensive and lost consciousness. Exam significant for images as noted above. Hypotension. Extensor tendon disrupted in the left middle finger.   Concerns for neurovascular compromise distal.  DDX considered: Laceration, hemorrhagic shock, extensor tendon injury, neurovascular injury to the finger, fracture, dislocation      Patient condition on initial evaluation: Critically ill    Plan:   Emergency release blood  Cardiac monitoring  Immediate consultation of hand surgery    Orders as below:  Orders Placed This Encounter    WOUND REPAIR    XR HAND LT MIN 3 V    CBC WITH AUTOMATED DIFF    PROTHROMBIN TIME + INR    BASIC METABOLIC PANEL    HGB & HCT    TRANSFUSE PACKED RBC'S, 2 Units    EKG, 12 LEAD, INITIAL    EMERGENT RELEASE OF UNCROSSMATCHED RED CELLS    0.9% sodium chloride infusion 250 mL    HYDROmorphone (DILAUDID) 1 mg/mL injection    HYDROmorphone (DILAUDID) injection 1 mg    piperacillin-tazobactam (ZOSYN) 3.375 g in 0.9% sodium chloride (MBP/ADV) 100 mL MBP    diph,Pertuss(AC),Tet Vac-PF (BOOSTRIX) suspension 0.5 mL    HYDROmorphone (DILAUDID) injection 1 mg    HYDROmorphone (DILAUDID) 1 mg/mL injection    lidocaine (XYLOCAINE) 20 mg/mL (2 %) injection    lidocaine (XYLOCAINE) 20 mg/mL (2 %) injection 6 mg    HYDROmorphone (DILAUDID) 2 mg/mL injection    HYDROmorphone (DILAUDID) injection 2 mg    HYDROmorphone (DILAUDID) injection 1 mg    HYDROmorphone (DILAUDID) 1 mg/mL injection        ED Course: Within 10 minutes of arrival, consulted hand surgery at [facility].  They recommended continued stabilization of the patient, as we were giving emergency release blood. They recommended covering the bone with partial approximation of the skin. We will obliged them. They asked for me to call back in a couple of hours. I have placed a tourniquet on his left arm, but I have not brought the tourniquet up (the patient was never under tourniquet)    Patient blood pressure increased nicely. The patient received 2 units of packed red blood cells. Initial hemoglobin demonstrated anemia with improvement after 2 units of blood. Patient feeling much better. Loosely approximated laceration on left ring finger utilizing 4-0 suture    Confirmed acceptance by hand surgery and ER physician at [facility].  The patient will be transported there. The patient is stable at this time. The finger is threatened. Patient condition at time of disposition: Seriously ill    Procedures:  Wound Repair    Date/Time: 10/20/2020 6:11 AM  Performed by: attendingPreparation: sterile field established and skin prepped with ChloraPrep  Pre-procedure re-eval: Immediately prior to the procedure, the patient was reevaluated and found suitable for the planned procedure and any planned medications. Location details: left long finger  Wound length: 6 cm. Anesthesia: digital block    Anesthesia:  Local Anesthetic: lidocaine 1% without epinephrine  Anesthetic total: 7 mL  Irrigation solution: saline  Debridement: extensive  Skin closure: 4-0 nylon  Number of sutures: 1  Technique: running  Approximation: loose  My total time at bedside, performing this procedure was 16-30 minutes.           Critical Care Time:  The services I provided to this patient were to treat and/or prevent clinically significant deterioration that could result in the failure of one or more body systems and/or organ systems due to hemorrhagic shock, large hand injury, trauma. Services included the following:  -reviewing nursing notes and old charts  -vital sign assessments  -direct patient care  -medication orders and management  -interpreting and reviewing diagnostic studies/labs  -re-evaluations  -documentation time    Aggregate critical care time was 50 minutes, which includes only time during which I was engaged in work directly related to the patient's care as described above, whether I was at bedside or elsewhere in the Emergency Department. It did not include time spent performing other reported procedures or the services of residents, students, nurses, or advance practice providers. Mary Kate Childers MD    6:24 AM    Disposition: Transfer    Diagnosis     Clinical Impression:   1. Hemorrhagic shock ([facility] [address]    2. Laceration of left middle finger without foreign body, nail damage status unspecified, initial encounter    3. Syncope and collapse        Signed,  Rashmi Recinos MD  Emergency Physician  [facility]    As a voice dictation software was utilized to dictate this note, minor word transpositions can occur. I apologize for confusing wording and typographic errors. Please feel free to contact me for clarification.

## 2020-10-20 NOTE — ED TRIAGE NOTES
Patient presents to the ED for evaluation of a laceration. Per medic, \"Patient was using a circular saw when he accidentally cut the middle finger on his left hand. \"

## 2020-10-21 ENCOUNTER — HOSPITAL ENCOUNTER (OUTPATIENT)
Dept: PHYSICAL THERAPY | Age: 60
Discharge: HOME | End: 2020-10-21
Payer: MEDICAID

## 2020-10-21 PROCEDURE — 97110 THERAPEUTIC EXERCISES: CPT

## 2020-10-21 PROCEDURE — 97530 THERAPEUTIC ACTIVITIES: CPT

## 2020-10-21 PROCEDURE — 97112 NEUROMUSCULAR REEDUCATION: CPT

## 2020-10-21 NOTE — PROGRESS NOTES
PT DAILY TREATMENT NOTE 10-18    Patient Name: Kenny Phelps  Date:10/21/2020  : 1960  [x]  Patient  Verified  Payor: Ziyda Lee / Plan: [id number] PLAN / Product Type: Managed Care Medicaid /    In time:8:12  Out time:8:50  Total Treatment Time (min): 38  Visit #: 4 of 12    Treatment Area: Low back pain [M54.5]    SUBJECTIVE  Pain Level (0-10 scale): 6  Any medication changes, allergies to medications, adverse drug reactions, diagnosis change, or new procedure performed?: [x] No    [] Yes (see summary sheet for update)  Subjective functional status/changes:   [] No changes reported  \"My back is doing alright\"  Pt arrived to clinic with gauze wrapped around left hand, visible blood soaked bandage.  Pt states he cut his hand Monday night with a table saw, was supposed to get surgery but hospital was back up, scheduled to see MD today at 3:30 pm.    OBJECTIVE    20 min Therapeutic Exercise:  [x] See flow sheet :   Rationale: increase ROM and increase strength to improve the patients ability to perform ADLs    8 min Therapeutic Activity:  [x]  See flow sheet :   Rationale: increase ROM, increase strength and improve coordination  to improve the patients ability to increase ease with daily tasks     10 min Neuromuscular Re-education:  [x]  See flow sheet :   Rationale: increase strength and increase proprioception  to improve the patients ability to perform functional tsks            With   [] TE   [] TA   [] neuro   [] other: Patient Education: [x] Review HEP    [] Progressed/Changed HEP based on:   [] positioning   [] body mechanics   [] transfers   [] heat/ice application    [] other:      Other Objective/Functional Measures:   incr'd reps per flow sheet   Modified seated on DD ex's due to left hand wrapping, unable to hold band    Pain Level (0-10 scale) post treatment: 5    ASSESSMENT/Changes in Function: Some therex modified today to avoid use of left hand that is wrapped due to injury with table saw on Monday night (10/19/2020). Notes mind decr'd pain with therex today. Demo's good carry over from previous visits and good TA activation throughout treatment. Seated on DD is good challenge, min postural cues req'd. Patient will continue to benefit from skilled PT services to modify and progress therapeutic interventions, address functional mobility deficits, address ROM deficits, address strength deficits, analyze and address soft tissue restrictions, analyze and cue movement patterns, analyze and modify body mechanics/ergonomics and assess and modify postural abnormalities to attain remaining goals. []  See Plan of Care  []  See progress note/recertification  []  See Discharge Summary         Progress towards goals / Updated goals:  Short Term Goals: To be accomplished in 1 weeks:               1. Pt will be compliant with a HEP to improve LS function.              Current: pt reports he has not begun to implement HEP yet. 10/14/2020   Long Term Goals: To be accomplished in 6 weeks:               1. Pt will increase FOTO score by 12 pts to improve LS function.                2. Pt will report pain decrease by 50% to ease with Job duties.              3. Pt will report pain <3/10 at worst while performing job duties.                 4. Pt will demonstrate proper body mechanics to lift 15lbs w/o cueing  x 5 to avoid any future LS injury.       PLAN  []  Upgrade activities as tolerated     [x]  Continue plan of care  []  Update interventions per flow sheet       []  Discharge due to:_  []  Other:_      Luis Numbers, PTA 10/21/2020  8:11 AM    Future Appointments   Date Time Provider Wanda Chavez   10/21/2020  8:15 AM Shea Cinnamon MMCPTPB [facility]   10/26/2020  9:45 AM Mally Bullard, PT [id number] [facility]   10/28/2020  9:45 AM Shea Cinnamon MMCPTPB [facility]   11/2/2020  9:45 AM Pretty Tanner [id number] [facility]   11/4/2020  9:45 AM Yolanda Robles, PT [id number] [facility] 11/9/2020  9:45 AM Isra Medicine, PT MMCPTPB [facility]   11/11/2020  9:45 AM Alcides Dos Santos, PT [id number] [facility]   11/16/2020  9:45 AM Florian Mansfield MMCPTPB [facility]   11/18/2020 10:30 AM Isra Suggs, PT MMCPTPB [facility]   11/23/2020  9:45 AM Florian Mansfield [id number] [facility]   11/25/2020  9:45 AM Isra Suggs, PT MMCPTPB [facility]

## 2020-10-26 ENCOUNTER — HOSPITAL ENCOUNTER (OUTPATIENT)
Dept: PHYSICAL THERAPY | Age: 60
Discharge: HOME | End: 2020-10-26
Payer: MEDICAID

## 2020-10-28 ENCOUNTER — APPOINTMENT (OUTPATIENT)
Dept: PHYSICAL THERAPY | Age: 60
End: 2020-10-28
Payer: MEDICAID

## 2020-11-02 ENCOUNTER — APPOINTMENT (OUTPATIENT)
Dept: PHYSICAL THERAPY | Age: 60
End: 2020-11-02

## 2020-11-04 ENCOUNTER — APPOINTMENT (OUTPATIENT)
Dept: PHYSICAL THERAPY | Age: 60
End: 2020-11-04

## 2020-11-09 ENCOUNTER — APPOINTMENT (OUTPATIENT)
Dept: PHYSICAL THERAPY | Age: 60
End: 2020-11-09

## 2020-11-11 ENCOUNTER — APPOINTMENT (OUTPATIENT)
Dept: PHYSICAL THERAPY | Age: 60
End: 2020-11-11

## 2020-11-16 ENCOUNTER — APPOINTMENT (OUTPATIENT)
Dept: PHYSICAL THERAPY | Age: 60
End: 2020-11-16

## 2020-11-18 ENCOUNTER — HOSPITAL ENCOUNTER (OUTPATIENT)
Dept: PHYSICAL THERAPY | Age: 60
End: 2020-11-18

## 2020-11-23 ENCOUNTER — APPOINTMENT (OUTPATIENT)
Dept: PHYSICAL THERAPY | Age: 60
End: 2020-11-23

## 2020-11-25 ENCOUNTER — HOSPITAL ENCOUNTER (OUTPATIENT)
Dept: PHYSICAL THERAPY | Age: 60
End: 2020-11-25

## 2020-12-23 NOTE — PROGRESS NOTES
[facility] - [place] Duty  [address]  [phone] fax    Physical Therapy Discharge Summary    Patient name: Charan Love Start of Care: 10/12/2020   Referral source: Gilford Dacosta, MD : 1960                Medical Diagnosis: Low back pain [M54.5]  Payor: [facility] MEDICAID / Plan: [address] [employer] / Product Type: Managed Care Medicaid /  Onset Date:>1 yr                Treatment Diagnosis: LBP   Prior Hospitalization: see medical history Provider#: [id number]   Medications: Verified on Patient summary List    Comorbidities: HBP, Arthritis, Left THR 2020   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 Visits from Start of Care: 4    Missed Visits: 2    Reporting Period : 10/12/20 to 10/21/20    Summary of Care:  Goal: Pt will increase FOTO score by 12 pts to improve LS function. Status at last note/certification: 38  Status at discharge: not met    Goal:  Pt will report pain decrease by 50% to ease with Job duties. Status at last note/certification: n/a  Status at discharge: not met    Goal:  Pt will report pain <3/10 at worst while performing job duties. Status at last note/certification:6/10  Status at discharge: not met    Goal: Pt will demonstrate proper body mechanics to lift 15lbs w/o cueing  x 5 to avoid any future LS njury. Status at last note/certification: n/a  Status at discharge: not met    Pt. Was seen for 4 visits then did not return to PT. Goals were unable to be re-assessed secondary to unplanned D/C.  He had a surgery and was awaiting physician clearance to return to PT.       ASSESSMENT/RECOMMENDATIONS:  [x]Discontinue therapy: []Patient has reached or is progressing toward set goals      []Patient is non-compliant or has abdicated      []Due to lack of appreciable progress towards set goals      X: surgery     Brianne Silva, PT 2020 2:20 PM

## 2020-12-28 NOTE — TELEPHONE ENCOUNTER
11/13/2019 Patient  documentation last office note, but it does not state what Dr. Nirav Castaneda told him about his condition. Patient needs to have Dr. Nirav Castaneda to due a letter stating what Dr. Nirav Castaneda found after going over the xrays and evaluations and that Dr. Nirav Castaneda feels his condition started over  5 years ago and that due to the patient conditio he needs to have surgery and that his knees have been affected also by his hip condition. Patient asking to speak with Dr Nirav Castaneda as he is in so much pain and needs specific letter to get his disaility   Patient can be reached at [phone]
[address] notifying patient to call our office back. Please notify him per SUNITHA Dennis he may  a copy of his last office note. This should state in detail all he is requesting/needing.
Ok to provide previous office note.
Patient called and asked if he could have a letter drafted stating of how long his injury has been ongoing and why it is urgent for him to have sx. He also wants this letter to say that Dr. Bam Pleitez verbally mentioned a knee replacement in the near future. Patient states that he needs this letter to give to disability because he can not work due to his injury and they are refusing to approve his disability claim until after his surgery. Please advise.     Patient tel : [phone]
Patient called and stated that he received a call from disability stating that [employer] told them to hold off on his disability claim until after his surgery in Jan.     Patient states he's very upset and would like [employer] to call him and explain why he made that decision . He states if he doesn't get a call back within 48  hours , he will start looking for a different surgeon.     Patient tel: [phone]
Please review with Dr. Gill Lozano.
28-Dec-2020 04:00
